# Patient Record
Sex: MALE | Race: WHITE | NOT HISPANIC OR LATINO | Employment: UNEMPLOYED | URBAN - METROPOLITAN AREA
[De-identification: names, ages, dates, MRNs, and addresses within clinical notes are randomized per-mention and may not be internally consistent; named-entity substitution may affect disease eponyms.]

---

## 2017-08-07 DIAGNOSIS — J30.2 OTHER SEASONAL ALLERGIC RHINITIS: ICD-10-CM

## 2017-08-07 NOTE — TELEPHONE ENCOUNTER
Routing refill request to provider for review/approval because:  Bronwyn given x1 and patient did not follow up, please advise    Thanks!     Lavern Collazo RN

## 2017-08-08 RX ORDER — FLUTICASONE PROPIONATE 50 MCG
SPRAY, SUSPENSION (ML) NASAL
Qty: 0.1 ML | Refills: 0
Start: 2017-08-08

## 2017-08-29 DIAGNOSIS — J30.2 OTHER SEASONAL ALLERGIC RHINITIS: ICD-10-CM

## 2017-08-29 RX ORDER — FLUTICASONE PROPIONATE 50 MCG
SPRAY, SUSPENSION (ML) NASAL
Qty: 16 ML | Refills: 0 | Status: SHIPPED | OUTPATIENT
Start: 2017-08-29 | End: 2022-08-05

## 2018-07-02 ENCOUNTER — OFFICE VISIT (OUTPATIENT)
Dept: DERMATOLOGY | Facility: CLINIC | Age: 48
End: 2018-07-02
Payer: COMMERCIAL

## 2018-07-02 VITALS — SYSTOLIC BLOOD PRESSURE: 146 MMHG | DIASTOLIC BLOOD PRESSURE: 89 MMHG | OXYGEN SATURATION: 97 % | HEART RATE: 75 BPM

## 2018-07-02 DIAGNOSIS — L81.4 LENTIGINES: ICD-10-CM

## 2018-07-02 DIAGNOSIS — L20.9 ATOPIC DERMATITIS, UNSPECIFIED TYPE: Primary | ICD-10-CM

## 2018-07-02 DIAGNOSIS — D48.5 NEOPLASM OF UNCERTAIN BEHAVIOR OF SKIN: ICD-10-CM

## 2018-07-02 DIAGNOSIS — D22.9 MULTIPLE NEVI: ICD-10-CM

## 2018-07-02 PROCEDURE — 88305 TISSUE EXAM BY PATHOLOGIST: CPT | Mod: TC | Performed by: PHYSICIAN ASSISTANT

## 2018-07-02 PROCEDURE — 11101 HC BIOPSY SKIN/SUBQ/MUC MEM, EACH ADDTL LESION: CPT | Performed by: PHYSICIAN ASSISTANT

## 2018-07-02 PROCEDURE — 11100 HC BIOPSY SKIN/SUBQ/MUC MEM, SINGLE LESION: CPT | Performed by: PHYSICIAN ASSISTANT

## 2018-07-02 PROCEDURE — 99203 OFFICE O/P NEW LOW 30 MIN: CPT | Mod: 25 | Performed by: PHYSICIAN ASSISTANT

## 2018-07-02 RX ORDER — TRIAMCINOLONE ACETONIDE 1 MG/G
CREAM TOPICAL
Qty: 80 G | Refills: 1 | Status: SHIPPED | OUTPATIENT
Start: 2018-07-02 | End: 2023-01-01

## 2018-07-02 NOTE — PROGRESS NOTES
HPI:  Esteban Casillas is a 47 year old year old male patient here today for fbe. Has a few moles on his back that he would like looked at. He has had some spots removed in the past, states all were benign  Duration: years  Symptoms:  none     Previous treatments: none     Alleviating/aggravating factors: none    Associated symptoms: none  Additional findings:grandfather had skin cancer on nose. Worked as a farmer.   Patient has no other skin complaints today.  Remainder of the HPI, Meds, PMH, Allergies, FH, and SH was reviewed in chart.      Past Medical History:   Diagnosis Date     Pain in joint, shoulder region        History reviewed. No pertinent surgical history.     Family History   Problem Relation Age of Onset     Breast Cancer Mother      Dementia Father      Skin Cancer Maternal Grandfather        Social History     Social History     Marital status:      Spouse name: N/A     Number of children: N/A     Years of education: N/A     Occupational History     Not on file.     Social History Main Topics     Smoking status: Never Smoker     Smokeless tobacco: Never Used     Alcohol use Yes     Drug use: No     Sexual activity: Yes     Partners: Female     Other Topics Concern     Not on file     Social History Narrative       Outpatient Encounter Prescriptions as of 7/2/2018   Medication Sig Dispense Refill     cetirizine HCl (ZYRTEC ALLERGY) 10 MG CAPS Take 1 tablet by mouth daily       fluticasone (FLONASE) 50 MCG/ACT nasal spray Spray 1-2 sprays into both nostrils daily 16 g 11     fluticasone (FLONASE) 50 MCG/ACT spray INSTILL 1-2 SPRAYS INTO BOTH NOSTRILS ONCE DAILY. 16 mL 0     triamcinolone (KENALOG) 0.1 % cream Apply a thin layer to affected area BID x 1-2 weeks during flares. Taper with improvement. 80 g 1     NO ACTIVE MEDICATIONS        No facility-administered encounter medications on file as of 7/2/2018.        Review Of Systems:  Skin: As above  Eyes: negative  Ears/Nose/Throat:  negative  Respiratory: No shortness of breath, dyspnea on exertion, cough, or hemoptysis  Cardiovascular: negative  Gastrointestinal: negative  Genitourinary: negative  Musculoskeletal: negative  Neurologic: negative  Psychiatric: negative  Hematologic/Lymphatic/Immunologic: negative  Endocrine: negative      Objective:     /89  Pulse 75  SpO2 97%  Eyes: Conjunctivae/lids: Normal   ENT: Lips:  Normal  MSK: Normal  Cardiovascular: Peripheral edema none  Pulm: Breathing Normal  Neuro/Psych: Orientation: Normal; Mood/Affect: Normal, NAD, WDWN  Following areas examined:   Scalp, face, eyelids, lips, neck, chest, abdomen, back, buttocks, and R&L upper and lower extremities.  Patrick skin type:I   Findings:    1)Tan WD smooth macules on face, neck, trunk, and extremities.  2)Well circumscribed macules with symmetric color distribution on trunk and extremities 2-4mm  3) WD light brown macule on right lateral lumbar back 0.7 cm   4) WD light/medium brown patch on right medial lumbar back 1.1cm  5) normal appearing skin      Assessment and Plan:  1-2) lentigines and benign nevi  I discussed the specifics of tumor, prognosis, and genetics of benign lesions.  I explained that treatment of these lesions would be purely cosmetic and not medically neccessary.  I discussed with patient different removal options including excision, cryotherapy, cautery and /or laser.      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  No complications and routine wound care.  May grow back and will get a scar. Based on lesion type may need to completely remove lesion. Patient will be notified in 7-10 days of results. Wound care directions given.  4) Neoplasm of uncertain behavior on right medial lumbar back 1.1cm  Rule out atypical nevus  TANGENTIAL BIOPSY:  After consent, anesthesia with LEC  and prep, tangential biopsy performed.  No complications and routine wound care.  May grow back and will get a scar. Based on lesion type may need to completely remove lesion. Patient will be notified in 7-10 days of results. Wound care directions given.  Signs and Symptoms of non-melanoma skin cancer and ABCDEs of melanoma reviewed with patient. Patient encouraged to perform monthly self skin exams and educated on how to perform them. UV precautions reviewed with patient. Patient was asked about new or changing moles/lesions on body.   5) Atopic dermatitis  Apply TMC BID x 1-2 weeks tapering with improvement  Discussed side effects of topical steroids including but not limited to atrophy (skin thinning), striae (stretch marks) telangiectasias, steroid acne, and others. Do not apply to normal skin. Do not apply to discolored skin that does not have rash present. Educated patient on post inflammatory hyperpigmentation.       Follow up in yearly FBE

## 2018-07-02 NOTE — PATIENT INSTRUCTIONS
Proper skin care from [facility]:    -Eliminate harsh soaps as they strip the natural oils from the skin, often resulting in dry itchy skin ( i.e. Dial, Zest, Laurie Spring)  -Use mild soaps such as Cetaphil or Dove Sensitive Skin in the shower. You do not need to use soap on arms, legs, and trunk every time you shower unless visibly soiled.   -Avoid hot or cold showers.  -After showering, lightly dry off and apply moisturizing within 2-3 minutes. This will help trap moisture in the skin.   -Aggressive use of a moisturizer at least 1-2 times a day to the entire body (including -Vanicream, Cetaphil, Aquaphor or Cerave) and moisturize hands after every washing.  -We recommend using moisturizers that come in a tub that needs to be scooped out, not a pump. This has more of an oil base. It will hold moisture in your skin much better than a water base moisturizer. The above recommended are non-pore clogging.           Wear a sunscreen with at least SPF 30 on your face, ears, neck and V of the chest daily. Wear sunscreen on other areas of the body if those areas are exposed to the sun throughout the day. Sunscreens can contain physical and/or chemical blockers. Physical blockers are less likely to clog pores, these include zinc oxide and titanium dioxide. Reapply every two hour and after swimming. Sunscreen examples include Neutrogena, CeraVe, Blue Lizard, Elta MD and many others.    UV radiation  UVA radiation remains constant throughout the day and throughout the year. It is a longer wavelength than UVB and therefore penetrates deeper into the skin leading to immediate and delayed tanning, photoaging, and skin cancer. 70-80% of UVA and UVB radiation occurs between the hours of 10am-2pm.  UVB radiation  UVB radiation causes the most harmful effects and is more significant during the summer months. However, snow and ice can reflect UVB radiation leading to skin damage during the winter months as well. UVB  radiation is responsible for tanning, burning, inflammation, delayed erythema (pinkness), pigmentation (brown spots), and skin cancer.   Just because you do not burn or are not developing a tan does not mean that you are not damaging your skin. A 15 minute drive to and from work for 30 years an lead to chronic sun damage of the skin. It is important to wear a broad spectrum (both UVA and UVB) sunscreen EVERY day with at least 30 SPF. Apply to face, ears, neck and v of the chest as this is where most of our sun exposure is. Reapply sunscreen every two hours if you plan on being outside.   Thompson Jung. Clinical Dermatology: A Color Guide to Diagnosis and Therapy. Elsevier, 2016.                  Wound Care Instructions     FOR SUPERFICIAL WOUNDS     [facility] [phone]    [facility] [phone]          AFTER 24 HOURS YOU SHOULD REMOVE THE BANDAGE AND BEGIN DAILY DRESSING CHANGES AS FOLLOWS:     1) Remove Dressing.     2) Clean and dry the area with tap water using a Q-tip or sterile gauze pad.     3) Apply Vaseline, Aquaphor, Polysporin ointment or Bacitracin ointment over entire wound.  Do NOT use Neosporin ointment.     4) Cover the wound with a band-aid, or a sterile non-stick gauze pad and micropore paper tape      REPEAT THESE INSTRUCTIONS AT LEAST ONCE A DAY UNTIL THE WOUND HAS COMPLETELY HEALED.    It is an old wives tale that a wound heals better when it is exposed to air and allowed to dry out. The wound will heal faster with a better cosmetic result if it is kept moist with ointment and covered with a bandage.    **Do not let the wound dry out.**      Supplies Needed:      *Cotton tipped applicators (Q-tips)    *Polysporin Ointment or Bacitracin Ointment (NOT NEOSPORIN)    *Band-aids or non-stick gauze pads and micropore paper tape.      PATIENT INFORMATION:    During the healing process you will notice a number of changes. All wounds develop a small halo of redness  surrounding the wound.  This means healing is occurring. Severe itching with extensive redness usually indicates sensitivity to the ointment or bandage tape used to dress the wound.  You should call our office if this develops.      Swelling  and/or discoloration around your surgical site is common, particularly when performed around the eye.    All wounds normally drain.  The larger the wound the more drainage there will be.  After 7-10 days, you will notice the wound beginning to shrink and new skin will begin to grow.  The wound is healed when you can see skin has formed over the entire area.  A healed wound has a healthy, shiny look to the surface and is red to dark pink in color to normalize.  Wounds may take approximately 4-6 weeks to heal.  Larger wounds may take 6-8 weeks.  After the wound is healed you may discontinue dressing changes.    You may experience a sensation of tightness as your wound heals. This is normal and will gradually subside.    Your healed wound may be sensitive to temperature changes. This sensitivity improves with time, but if you re having a lot of discomfort, try to avoid temperature extremes.    Patients frequently experience itching after their wound appears to have healed because of the continue healing under the skin.  Plain Vaseline will help relieve the itching.        POSSIBLE COMPLICATIONS    BLEEDIN. Leave the bandage in place.  2. Use tightly rolled up gauze or a cloth to apply direct pressure over the bandage for 30  minutes.  3. Reapply pressure for an additional 30 minutes if necessary  4. Use additional gauze and tape to maintain pressure once the bleeding has stopped.

## 2018-07-02 NOTE — MR AVS SNAPSHOT
After Visit Summary   7/2/2018    Esteban Casillas    MRN: [id number]           Patient Information     Date Of Birth          1970        Visit Information        Provider Department      7/2/2018 3:20 PM Evelyn Osborne PA-C [facility]        Today's Diagnoses     Atopic dermatitis, unspecified type    -  1    Neoplasm of uncertain behavior of skin          Care Instructions    Proper skin care from [facility]:    -Eliminate harsh soaps as they strip the natural oils from the skin, often resulting in dry itchy skin ( i.e. Dial, Zest, [place] Spring)  -Use mild soaps such as Cetaphil or Dove Sensitive Skin in the shower. You do not need to use soap on arms, legs, and trunk every time you shower unless visibly soiled.   -Avoid hot or cold showers.  -After showering, lightly dry off and apply moisturizing within 2-3 minutes. This will help trap moisture in the skin.   -Aggressive use of a moisturizer at least 1-2 times a day to the entire body (including -Vanicream, Cetaphil, Aquaphor or Cerave) and moisturize hands after every washing.  -We recommend using moisturizers that come in a tub that needs to be scooped out, not a pump. This has more of an oil base. It will hold moisture in your skin much better than a water base moisturizer. The above recommended are non-pore clogging.           Wear a sunscreen with at least SPF 30 on your face, ears, neck and V of the chest daily. Wear sunscreen on other areas of the body if those areas are exposed to the sun throughout the day. Sunscreens can contain physical and/or chemical blockers. Physical blockers are less likely to clog pores, these include zinc oxide and titanium dioxide. Reapply every two hour and after swimming. Sunscreen examples include Neutrogena, CeraVe, Blue Lizard, Elta MD and many others.    UV radiation  UVA radiation remains constant throughout the day and throughout the year. It is a longer  wavelength than UVB and therefore penetrates deeper into the skin leading to immediate and delayed tanning, photoaging, and skin cancer. 70-80% of UVA and UVB radiation occurs between the hours of 10am-2pm.  UVB radiation  UVB radiation causes the most harmful effects and is more significant during the summer months. However, snow and ice can reflect UVB radiation leading to skin damage during the winter months as well. UVB radiation is responsible for tanning, burning, inflammation, delayed erythema (pinkness), pigmentation (brown spots), and skin cancer.   Just because you do not burn or are not developing a tan does not mean that you are not damaging your skin. A 15 minute drive to and from work for 30 years an lead to chronic sun damage of the skin. It is important to wear a broad spectrum (both UVA and UVB) sunscreen EVERY day with at least 30 SPF. Apply to face, ears, neck and v of the chest as this is where most of our sun exposure is. Reapply sunscreen every two hours if you plan on being outside.   Thompson Jung. Clinical Dermatology: A Color Guide to Diagnosis and Therapy. Elsevier, 2016.                  Wound Care Instructions     FOR SUPERFICIAL WOUNDS     [facility] [phone]    [facility] [phone]          AFTER 24 HOURS YOU SHOULD REMOVE THE BANDAGE AND BEGIN DAILY DRESSING CHANGES AS FOLLOWS:     1) Remove Dressing.     2) Clean and dry the area with tap water using a Q-tip or sterile gauze pad.     3) Apply Vaseline, Aquaphor, Polysporin ointment or Bacitracin ointment over entire wound.  Do NOT use Neosporin ointment.     4) Cover the wound with a band-aid, or a sterile non-stick gauze pad and micropore paper tape      REPEAT THESE INSTRUCTIONS AT LEAST ONCE A DAY UNTIL THE WOUND HAS COMPLETELY HEALED.    It is an old wives tale that a wound heals better when it is exposed to air and allowed to dry out. The wound will heal faster with a better cosmetic result  if it is kept moist with ointment and covered with a bandage.    **Do not let the wound dry out.**      Supplies Needed:      *Cotton tipped applicators (Q-tips)    *Polysporin Ointment or Bacitracin Ointment (NOT NEOSPORIN)    *Band-aids or non-stick gauze pads and micropore paper tape.      PATIENT INFORMATION:    During the healing process you will notice a number of changes. All wounds develop a small halo of redness surrounding the wound.  This means healing is occurring. Severe itching with extensive redness usually indicates sensitivity to the ointment or bandage tape used to dress the wound.  You should call our office if this develops.      Swelling  and/or discoloration around your surgical site is common, particularly when performed around the eye.    All wounds normally drain.  The larger the wound the more drainage there will be.  After 7-10 days, you will notice the wound beginning to shrink and new skin will begin to grow.  The wound is healed when you can see skin has formed over the entire area.  A healed wound has a healthy, shiny look to the surface and is red to dark pink in color to normalize.  Wounds may take approximately 4-6 weeks to heal.  Larger wounds may take 6-8 weeks.  After the wound is healed you may discontinue dressing changes.    You may experience a sensation of tightness as your wound heals. This is normal and will gradually subside.    Your healed wound may be sensitive to temperature changes. This sensitivity improves with time, but if you re having a lot of discomfort, try to avoid temperature extremes.    Patients frequently experience itching after their wound appears to have healed because of the continue healing under the skin.  Plain Vaseline will help relieve the itching.        POSSIBLE COMPLICATIONS    BLEEDIN. Leave the bandage in place.  2. Use tightly rolled up gauze or a cloth to apply direct pressure over the bandage for 30  minutes.  3. Reapply pressure  for an additional 30 minutes if necessary  4. Use additional gauze and tape to maintain pressure once the bleeding has stopped.              Follow-ups after your visit        Who to contact     If you have questions or need follow up information about today's clinic visit or your schedule please contact [facility] directly at [phone].  Normal or non-critical lab and imaging results will be communicated to you by MyChart, letter or phone within 4 business days after the clinic has received the results. If you do not hear from us within 7 days, please contact the clinic through MyChart or phone. If you have a critical or abnormal lab result, we will notify you by phone as soon as possible.  Submit refill requests through [employer] or call your pharmacy and they will forward the refill request to us. Please allow 3 business days for your refill to be completed.          Additional Information About Your Visit        Care EveryWhere ID     This is your Care EveryWhere ID. This could be used by other organizations to access your [facility] [facility] medical records  [id number]        Your Vitals Were     Pulse Pulse Oximetry                75 97%           Blood Pressure from Last 3 Encounters:   07/02/18 146/89   06/13/16 130/80    Weight from Last 3 Encounters:   06/13/16 102.5 kg (226 lb)   05/14/13 93 kg (205 lb)              We Performed the Following     BIOPSY SKIN/SUBQ/MUC MEM, EACH ADDTL LESION     BIOPSY SKIN/SUBQ/MUC MEM, SINGLE LESION     Dermatological path order and indications          Today's Medication Changes          These changes are accurate as of 7/2/18  3:47 PM.  If you have any questions, ask your nurse or doctor.               Start taking these medicines.        Dose/Directions    triamcinolone 0.1 % cream   Commonly known as:  KENALOG   Used for:  Atopic dermatitis, unspecified type   Started by:  Evelyn Osborne PA-C        Apply a thin layer to affected area BID x  1-2 weeks during flares. Taper with improvement.   Quantity:  80 g   Refills:  1            Where to get your medicines      These medications were sent to [place] [id number] [place] [facility] [address]  [address], [place] [address]     Phone:  [phone]     triamcinolone 0.1 % cream                Primary Care Provider    None Specified       No primary provider on file.        Equal Access to Services     [facility] Center[facility] : Hadii aad ku hadasho Soomaali, waaxda luqadaha, qaybta kaalmada adeegyada, waxay idiin hayaan adeeg kharash lamarcos . So [facility] [phone].    ATENCIÓN: Si habla espryne, tiene a townsend disposición servicios gratuitos de asistencia lingüística. Llame al [phone].    We comply with applicable federal civil rights laws and [place] laws. We do not discriminate on the basis of race, color, national origin, age, disability, sex, sexual orientation, or gender identity.            Thank you!     Thank you for choosing [facility]  for your care. Our goal is always to provide you with excellent care. Hearing back from our patients is one way we can continue to improve our services. Please take a few minutes to complete the written survey that you may receive in the mail after your visit with us. Thank you!             Your Updated Medication List - Protect others around you: Learn how to safely use, store and throw away your medicines at www.disposemymeds.org.          This list is accurate as of 7/2/18  3:47 PM.  Always use your most recent med list.                   Brand Name Dispense Instructions for use Diagnosis    * fluticasone 50 MCG/ACT spray    FLONASE    16 g    Spray 1-2 sprays into both nostrils daily    Other seasonal allergic rhinitis       * fluticasone 50 MCG/ACT spray    FLONASE    16 mL    INSTILL 1-2 SPRAYS INTO BOTH NOSTRILS ONCE DAILY.    Other seasonal allergic rhinitis       NO ACTIVE MEDICATIONS           triamcinolone 0.1 % cream     KENALOG    80 g    Apply a thin layer to affected area BID x 1-2 weeks during flares. Taper with improvement.    Atopic dermatitis, unspecified type       ZYRTEC ALLERGY 10 MG Caps   Generic drug:  cetirizine HCl      Take 1 tablet by mouth daily    Other seasonal allergic rhinitis       * Notice:  This list has 2 medication(s) that are the same as other medications prescribed for you. Read the directions carefully, and ask your doctor or other care provider to review them with you.

## 2018-07-06 LAB — COPATH REPORT: NORMAL

## 2018-07-09 ENCOUNTER — TELEPHONE (OUTPATIENT)
Dept: DERMATOLOGY | Facility: CLINIC | Age: 48
End: 2018-07-09

## 2018-07-09 NOTE — TELEPHONE ENCOUNTER
Called and LM for patient to call back in regards to biopsy results antonietta Forrest RN-BSN  [facility]  [phone]

## 2018-07-09 NOTE — TELEPHONE ENCOUNTER
Notes Recorded by Evelyn Osborne PA-C on 7/9/2018 at 1:36 PM  A. 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 Skin, right lateral lumbar back:   - Predominantly intradermal melanocytic nevus     This is a benign lesion that does not need any additional treatment. Please let me know if you have any questions.

## 2018-07-09 NOTE — LETTER
[address] [address]  Phone: [phone]  Fax: [phone]    July 12, 2018      Esteban Santarecht                                                                                                                   [address] [place] [id number]            Dear Susie Vinny,    Both of your biopsies came back as benign lesions. There is no additional treatment needed.     Please let me know if you have any questions.    Thank you,      [facility] / Kelley Jones RN-BSN

## 2018-07-10 NOTE — TELEPHONE ENCOUNTER
Called and LM for patient to call back in regards to biopsy results antonietta Forrest RN-BSN  [facility]  [phone]

## 2018-07-11 NOTE — TELEPHONE ENCOUNTER
Called and LM for patient to call back in regards to biopsy results antonietta Forrest RN-BSN  [facility]  [phone]

## 2018-07-12 NOTE — TELEPHONE ENCOUNTER
After 3 phone call attempts with no answer- snail mail letter sent:     Dear Mr. Casillas,    Both of your biopsies came back as benign lesions. There is no additional treatment needed.     Please let me know if you have any questions.    Thank you,      [facility] / Kelley Jones RN-BSN

## 2018-07-12 NOTE — TELEPHONE ENCOUNTER
Called and LM for patient to call back in regards to biopsy results antonietta Forrest RN-BSN  [facility]  [phone]

## 2019-12-02 ENCOUNTER — ANCILLARY PROCEDURE (OUTPATIENT)
Dept: GENERAL RADIOLOGY | Facility: CLINIC | Age: 49
End: 2019-12-02
Attending: PODIATRIST
Payer: OTHER GOVERNMENT

## 2019-12-02 ENCOUNTER — OFFICE VISIT (OUTPATIENT)
Dept: PODIATRY | Facility: CLINIC | Age: 49
End: 2019-12-02
Payer: OTHER GOVERNMENT

## 2019-12-02 VITALS
BODY MASS INDEX: 28.69 KG/M2 | WEIGHT: 235.6 LBS | HEIGHT: 76 IN | DIASTOLIC BLOOD PRESSURE: 80 MMHG | HEART RATE: 84 BPM | SYSTOLIC BLOOD PRESSURE: 128 MMHG

## 2019-12-02 DIAGNOSIS — S93.692A RUPTURE OF PLANTAR FASCIA OF LEFT FOOT, INITIAL ENCOUNTER: ICD-10-CM

## 2019-12-02 DIAGNOSIS — M79.672 PAIN OF LEFT HEEL: Primary | ICD-10-CM

## 2019-12-02 PROCEDURE — 73650 X-RAY EXAM OF HEEL: CPT | Mod: LT

## 2019-12-02 PROCEDURE — 99204 OFFICE O/P NEW MOD 45 MIN: CPT | Performed by: PODIATRIST

## 2019-12-02 ASSESSMENT — MIFFLIN-ST. JEOR: SCORE: 2045.17

## 2019-12-02 NOTE — PATIENT INSTRUCTIONS
Thank you for choosing [facility]!    Follow up in 2 weeks    DR. ALEJANDRE'S CLINIC LOCATIONS     MONDAY  [place] TUESDAY & FRIDAY AM  AD[place]   [address]   [address]   [place] [address] [place] [address]   [phone]  [phone]  [phone]       WEDNESDAY  [place] SCHEDULE SURGERY: [phone]   [address] [facility] APPOINTMENTS: [phone]   [place] [address] BILLING QUESTIONS: [phone]   [phone]         BLOOD CLOT PREVENTION - WEARING A CAST BOOT    Do not drive with the CAM boot  Do not wear during long-distance travel: long car rides / plane trips  Wear the boot when moving, regardless of your weight-bearing status    Any brace that extends up to the knee can increase your chance of developing a blood clot. Blood clots generally occur in the large veins of your calf, thigh, or abdomen. A blood clot may form when blood is stagnant in the veins while your leg is immobilized in the brace. Ginger and relaxing the calf muscles by moving the ankle is the best method to avoid stagnant blood. Clarify with your doctor if you should be doing this as this may not be recommended for certain tendon surgeries.    Blood clots or deep venous thrombosis (DVT) can be life threatening if a portion of the clot breaks away and travels through the veins to your lungs. This is called a pulmonary embolism (PE) which can result in death.    Symptoms of a DVT may include swelling, tenderness, a warm feeling or redness in the leg. DVT can occur in both legs, even if only one side is in a brace. If you have these symptoms, you should call your doctor immediately or go to the Emergency Room to have your leg scanned.     Symptoms of a pulmonary embolism (PE) include chest pain, shortness of breath or the need to breath rapidly that is not associated with exercise, difficulty breathing, rapid heart rate,  coughing or coughing up blood, or a feeling of passing out. Chest pain can range from mild to knife-like pain while taking a deep breath. Pulmonary embolism can occur without any symptoms whatsoever. You need to be evaluated in a hospital emergency room immediately if you have symptoms of a PE. Please call 911 as PE is a life-threatening emergency.    Be certain that you understand how much ankle range of motion is allowed. Your foot and ankle problem is being immobilized by the brace, yet we do not want you to develop a blood clot. The velcro strap braces are intended to be removed for periodic exercise of the ankle. Your doctor will tell you if it is okay to do this depending on the type of surgery or injury you had.    Your own personal risk of developing a DVT or PE is dependent on many factors. A personal or family history of previous blood clot or a clotting disorder (such as thrombophilia) puts you at risk. Other risk factors include history of cancer, current use of estrogen medications (such as birth control pills or post menopausal medications), recent trauma or fracture, diabetes, recent heart attack, COPD, heart failure, pregnancy, obesity, advanced age, smoking, etc. Please make sure your doctor is aware if you have any of these risk factors.    PRICE THERAPY  Many aches and pains throughout the foot and ankle can be helped with many simple treatments. This is usually described as PRICE Therapy.      P - Protection - often times, inflammation/pain in the lower extremity is not able to improve simply because the areas involved are never allowed to rest. Every step we take can bother the problematic area. Protecting those areas is an important step in the healing process. This may involve a walking cast boot, a special insert/orthotic device, an ankle brace, or simply avoiding barefoot walking.    R - Rest - in addition to protecting the foot/ankle, resting is an important, but often times difficult,  treatment option. Getting off your feet when they bother you, and specifically avoiding activities that cause pain/discomfort, are very beneficial to prevent, and treat, foot/ankle pain.      I - Ice - icing regularly can help to decrease inflammation and swelling in the foot, thus decreasing pain. Using an ice pack or a bag of frozen veggies works very well. Ice for 20 minutes multiple times per day as needed.  Do not place the ice directly on the skin as this can cause tissue damage.    C - Compression - using a compression wrap or an ACE wrap can help to decrease swelling, which can help to decrease pain. Wearing the wraps is generally not needed at night, but they should be worn on a regular basis when you are going to be on your feet for prolonged periods as gravity tends to pull fluids down to your feet/ankles.    E - Elevation - elevating your lower extremities multiple times daily for 15-20 minutes can help to decrease swelling, which works well in decreasing pain levels.    NSAID/Tylenol - Anti-inflammatories like Aleve or ibuprofen, and/or a pain medication, such as Tylenol, can help to improve pain levels and get the issue resolved sooner rather than later. Anyone with liver issues should be careful with Tylenol, and anyone with high blood pressure or heart, stomach or kidney issues should be careful with anti-inflammatories. Please ask if you have questions about these medications, including dosage.      BODY WEIGHT AND YOUR FEET  The following information is included in the after visit summary for all patients. Body weight can be a sensitive issue to discuss in clinic, but we think the following information is very important. Although we focus on the feet and ankles, we do support the overall health of our patients.     Many things can cause foot and ankle problems. Foot structure, activity level, foot mechanics and injuries are common causes of pain. One very important issue that often goes  unmentioned, is body weight. Extra weight can cause increased stress on muscles, ligaments, bones and tendons. Sometimes just a few extra pounds is all it takes to put one over her/his threshold. Without reducing that stress, it can be difficult to alleviate pain. As Foot & Ankle specialists, our job is addressing the lower extremity problem and possible causes. Regarding extra body weight, we encourage patients to discuss diet and weight management plans with their primary care doctors. It is this team approach that gives you the best opportunity for pain relief and getting you back on your feet.      [facility] has a Comprehensive Weight Management Program. This program includes counseling, education, non-surgical and surgical approaches to weight loss. If you are interested in learning more either talk to you primary care provider or call [phone].

## 2019-12-02 NOTE — LETTER
"    12/2/2019         RE: Esteban Casillas  [address] [place] [address]        Dear Colleague,    Thank you for referring your patient, Esteban Casillas, to the [facility]. Please see a copy of my visit note below.    PATIENT HISTORY:  Esteban Casillas is a 48 year old male who presents to clinic for L severe heel pain.  1 day duration.  Felt a \"pop\" under his heel working out yesterday.  Reports pain with wt bearing.  Reports some mild plantar heel pain prior.  No treatments.      Review of Systems:  Patient denies fever, chills, rash, wound, stiffness, limping, numbness, weakness, heart burn, blood in stool, chest pain with activity, calf pain when walking, shortness of breath with activity, chronic cough, easy bleeding/bruising, swelling of ankles, excessive thirst, fatigue, depression, anxiety.       PAST MEDICAL HISTORY:   Past Medical History:   Diagnosis Date     Pain in joint, shoulder region         PAST SURGICAL HISTORY:   No pertinent past surgical history.     MEDICATIONS:   Current Outpatient Medications:      order for DME, L CAM walker - short, Disp: 1 Device, Rfl: 0     cetirizine HCl (ZYRTEC ALLERGY) 10 MG CAPS, Take 1 tablet by mouth daily, Disp: , Rfl:      fluticasone (FLONASE) 50 MCG/ACT nasal spray, Spray 1-2 sprays into both nostrils daily, Disp: 16 g, Rfl: 11     fluticasone (FLONASE) 50 MCG/ACT spray, INSTILL 1-2 SPRAYS INTO BOTH NOSTRILS ONCE DAILY., Disp: 16 mL, Rfl: 0     NO ACTIVE MEDICATIONS, , Disp: , Rfl:      triamcinolone (KENALOG) 0.1 % cream, Apply a thin layer to affected area BID x 1-2 weeks during flares. Taper with improvement., Disp: 80 g, Rfl: 1     ALLERGIES:  No Known Allergies     SOCIAL HISTORY:   Social History     Socioeconomic History     Marital status:      Spouse name: Not on file     Number of children: Not on file     Years of education: Not on file     Highest education level: Not on file   Occupational History     Not on " "file   Social Needs     Financial resource strain: Not on file     Food insecurity:     Worry: Not on file     Inability: Not on file     Transportation needs:     Medical: Not on file     Non-medical: Not on file   Tobacco Use     Smoking status: Never Smoker     Smokeless tobacco: Never Used   Substance and Sexual Activity     Alcohol use: Yes     Drug use: No     Sexual activity: Yes     Partners: Female   Lifestyle     Physical activity:     Days per week: Not on file     Minutes per session: Not on file     Stress: Not on file   Relationships     Social connections:     Talks on phone: Not on file     Gets together: Not on file     Attends Voodoo service: Not on file     Active member of club or organization: Not on file     Attends meetings of clubs or organizations: Not on file     Relationship status: Not on file     Intimate partner violence:     Fear of current or ex partner: Not on file     Emotionally abused: Not on file     Physically abused: Not on file     Forced sexual activity: Not on file   Other Topics Concern     Parent/sibling w/ CABG, MI or angioplasty before 65F 55M? Not Asked   Social History Narrative     Not on file        FAMILY HISTORY:   Family History   Problem Relation Age of Onset     Breast Cancer Mother      Dementia Father      Skin Cancer Maternal Grandfather         EXAM:Vitals: /80   Pulse 84   Ht 1.93 m (6' 4\")   Wt 106.9 kg (235 lb 9.6 oz)   BMI 28.68 kg/m     BMI= Body mass index is 28.68 kg/m .    General appearance: Patient is alert and fully cooperative with history & exam.  No sign of distress is noted during the visit.     Psychiatric: Affect is pleasant & appropriate.  Patient appears motivated to improve health.     Respiratory: Breathing is regular & unlabored while sitting.     HEENT: Hearing is intact to spoken word.  Speech is clear.  No gross evidence of visual impairment that would impact ambulation.     Dermatologic: Skin is intact to L foot without " significant lesions, rash or abrasion.  No paronychia or evidence of soft tissue infection is noted.     Vascular: DP & PT pulses are intact & regular on the L.  No significant edema or varicosities noted.  CFT and skin temperature are normal.     Neurologic: Lower extremity sensation is intact to light touch.  No evidence of weakness or contracture in the lower extremities.  No evidence of neuropathy.     Musculoskeletal:  L plantar heel pain at fascial insertion.  Patient is ambulatory without assistive device or brace.  No gross ankle deformity noted.  No foot or ankle joint effusion is noted.    XRs of L heel reviewed with pt.  No acute findings.     ASSESSMENT:   L heel pain, suspect tear/rupture of plantar fascia     PLAN:  Reviewed patient's chart in epic.  Discussed condition and treatment options including pros and cons.    History/exam suggest plantar fascia tear/rupture, likely preceded by plantar fasciitis.  Plantar fasciitis alone possible, but pt reports a pop in the heel with acute pain.  RICE advised.  Will place in short Aircast boot.  WBAT.  Follow-up in 2 wks.  Pt deferred MR for now.    We discussed immobilization and the risk of blood clot. ROM exercises and compression recommended. Patient to seek medical attention if calf swelling and/or pain, chest pain, shortness of breath.    Raúl Macias DPM, FACFAS    Weight management plan: Patient was referred to their PCP to discuss a diet and exercise plan.      Again, thank you for allowing me to participate in the care of your patient.        Sincerely,        Raúl Macias DPM

## 2019-12-02 NOTE — PROGRESS NOTES
"PATIENT HISTORY:  Esteban Casillas is a 48 year old male who presents to clinic for L severe heel pain.  1 day duration.  Felt a \"pop\" under his heel working out yesterday.  Reports pain with wt bearing.  Reports some mild plantar heel pain prior.  No treatments.      Review of Systems:  Patient denies fever, chills, rash, wound, stiffness, limping, numbness, weakness, heart burn, blood in stool, chest pain with activity, calf pain when walking, shortness of breath with activity, chronic cough, easy bleeding/bruising, swelling of ankles, excessive thirst, fatigue, depression, anxiety.       PAST MEDICAL HISTORY:   Past Medical History:   Diagnosis Date     Pain in joint, shoulder region         PAST SURGICAL HISTORY:   No pertinent past surgical history.     MEDICATIONS:   Current Outpatient Medications:      order for DME, L CAM walker - short, Disp: 1 Device, Rfl: 0     cetirizine HCl (ZYRTEC ALLERGY) 10 MG CAPS, Take 1 tablet by mouth daily, Disp: , Rfl:      fluticasone (FLONASE) 50 MCG/ACT nasal spray, Spray 1-2 sprays into both nostrils daily, Disp: 16 g, Rfl: 11     fluticasone (FLONASE) 50 MCG/ACT spray, INSTILL 1-2 SPRAYS INTO BOTH NOSTRILS ONCE DAILY., Disp: 16 mL, Rfl: 0     NO ACTIVE MEDICATIONS, , Disp: , Rfl:      triamcinolone (KENALOG) 0.1 % cream, Apply a thin layer to affected area BID x 1-2 weeks during flares. Taper with improvement., Disp: 80 g, Rfl: 1     ALLERGIES:  No Known Allergies     SOCIAL HISTORY:   Social History     Socioeconomic History     Marital status:      Spouse name: Not on file     Number of children: Not on file     Years of education: Not on file     Highest education level: Not on file   Occupational History     Not on file   Social Needs     Financial resource strain: Not on file     Food insecurity:     Worry: Not on file     Inability: Not on file     Transportation needs:     Medical: Not on file     Non-medical: Not on file   Tobacco Use     Smoking status: Never " "Smoker     Smokeless tobacco: Never Used   Substance and Sexual Activity     Alcohol use: Yes     Drug use: No     Sexual activity: Yes     Partners: Female   Lifestyle     Physical activity:     Days per week: Not on file     Minutes per session: Not on file     Stress: Not on file   Relationships     Social connections:     Talks on phone: Not on file     Gets together: Not on file     Attends Gnosticism service: Not on file     Active member of club or organization: Not on file     Attends meetings of clubs or organizations: Not on file     Relationship status: Not on file     Intimate partner violence:     Fear of current or ex partner: Not on file     Emotionally abused: Not on file     Physically abused: Not on file     Forced sexual activity: Not on file   Other Topics Concern     Parent/sibling w/ CABG, MI or angioplasty before 65F 55M? Not Asked   Social History Narrative     Not on file        FAMILY HISTORY:   Family History   Problem Relation Age of Onset     Breast Cancer Mother      Dementia Father      Skin Cancer Maternal Grandfather         EXAM:Vitals: /80   Pulse 84   Ht 1.93 m (6' 4\")   Wt 106.9 kg (235 lb 9.6 oz)   BMI 28.68 kg/m    BMI= Body mass index is 28.68 kg/m .    General appearance: Patient is alert and fully cooperative with history & exam.  No sign of distress is noted during the visit.     Psychiatric: Affect is pleasant & appropriate.  Patient appears motivated to improve health.     Respiratory: Breathing is regular & unlabored while sitting.     HEENT: Hearing is intact to spoken word.  Speech is clear.  No gross evidence of visual impairment that would impact ambulation.     Dermatologic: Skin is intact to L foot without significant lesions, rash or abrasion.  No paronychia or evidence of soft tissue infection is noted.     Vascular: DP & PT pulses are intact & regular on the L.  No significant edema or varicosities noted.  CFT and skin temperature are normal.   "   Neurologic: Lower extremity sensation is intact to light touch.  No evidence of weakness or contracture in the lower extremities.  No evidence of neuropathy.     Musculoskeletal:  L plantar heel pain at fascial insertion.  Patient is ambulatory without assistive device or brace.  No gross ankle deformity noted.  No foot or ankle joint effusion is noted.    XRs of L heel reviewed with pt.  No acute findings.     ASSESSMENT:   L heel pain, suspect tear/rupture of plantar fascia     PLAN:  Reviewed patient's chart in epic.  Discussed condition and treatment options including pros and cons.    History/exam suggest plantar fascia tear/rupture, likely preceded by plantar fasciitis.  Plantar fasciitis alone possible, but pt reports a pop in the heel with acute pain.  RICE advised.  Will place in short Aircast boot.  WBAT.  Follow-up in 2 wks.  Pt deferred MR for now.    We discussed immobilization and the risk of blood clot. ROM exercises and compression recommended. Patient to seek medical attention if calf swelling and/or pain, chest pain, shortness of breath.    Raúl Macias DPM, FACFAS    Weight management plan: Patient was referred to their PCP to discuss a diet and exercise plan.

## 2019-12-16 ENCOUNTER — OFFICE VISIT (OUTPATIENT)
Dept: PODIATRY | Facility: CLINIC | Age: 49
End: 2019-12-16
Payer: OTHER GOVERNMENT

## 2019-12-16 VITALS
HEIGHT: 76 IN | DIASTOLIC BLOOD PRESSURE: 82 MMHG | BODY MASS INDEX: 28.62 KG/M2 | WEIGHT: 235 LBS | HEART RATE: 74 BPM | SYSTOLIC BLOOD PRESSURE: 132 MMHG

## 2019-12-16 DIAGNOSIS — M79.672 PAIN OF LEFT HEEL: Primary | ICD-10-CM

## 2019-12-16 PROCEDURE — 99213 OFFICE O/P EST LOW 20 MIN: CPT | Performed by: PODIATRIST

## 2019-12-16 ASSESSMENT — MIFFLIN-ST. JEOR: SCORE: 2042.45

## 2019-12-16 NOTE — PROGRESS NOTES
"PATIENT HISTORY:  Esteban Casillas is a 48 year old male who presents to clinic for recheck of L heel pain, suspected plantar fascia injury/tear.  Doing much better.  He is back in shoes.  The Aircast boot helped.  Injury was over 2 wks ago.  He has some pain in the AM, plantar heel.  No fevers, chills, limping.  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     EXAM:Vitals: /82   Pulse 74   Ht 1.93 m (6' 4\")   Wt 106.6 kg (235 lb)   BMI 28.61 kg/m    BMI= Body mass index is 28.61 kg/m .    General appearance: Patient is alert and fully cooperative with history & exam.  No sign of distress is noted during the visit.     Dermatologic: Skin is intact to L foot without significant lesions, rash or abrasion.  No paronychia or evidence of soft tissue infection is noted.     Vascular: DP & PT pulses are intact & regular on the L.  No significant edema or varicosities noted.  CFT and skin temperature are normal.     Neurologic: Lower extremity sensation is intact to light touch.  No evidence of weakness or contracture in the lower extremities.  No evidence of neuropathy.     Musculoskeletal: L plantar heel pain at fascial insertion, mild.  No pain with side to side heel squeeze.  Patient is ambulatory without assistive device or brace.  No gross ankle deformity noted.  No foot or ankle joint effusion is noted.     ASSESSMENT: L heel pain, suspect plantar fascia tear     PLAN:  Reviewed patient's chart in epic.  Discussed condition and treatment options including pros and cons.    Pt improved.  Likely has some residual plantar fasciitis.  May transition to supportive athletic shoes as tolerated.  Advised icing, stretching, superfeet inserts.  Avoid barefoot walking.  Discussed a gradual return to activity.  F/u prn.    Raúl Macias DPM, FACFAS    Weight management plan: Patient was referred to their PCP to discuss a diet and exercise plan.      "

## 2019-12-16 NOTE — LETTER
"    12/16/2019         RE: Esteban Casillas  [address] [place] [id number]        Dear Colleague,    Thank you for referring your patient, Esteban Casillas, to the [facility]. Please see a copy of my visit note below.    PATIENT HISTORY:  Esteban Casillas is a 48 year old male who presents to clinic for recheck of L heel pain, suspected plantar fascia injury/tear.  Doing much better.  He is back in shoes.  The Aircast boot helped.  Injury was over 2 wks ago.  He has some pain in the AM, plantar heel.  No fevers, chills, limping.  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     EXAM:Vitals: /82   Pulse 74   Ht 1.93 m (6' 4\")   Wt 106.6 kg (235 lb)   BMI 28.61 kg/m     BMI= Body mass index is 28.61 kg/m .    General appearance: Patient is alert and fully cooperative with history & exam.  No sign of distress is noted during the visit.     Dermatologic: Skin is intact to L foot without significant lesions, rash or abrasion.  No paronychia or evidence of soft tissue infection is noted.     Vascular: DP & PT pulses are intact & regular on the L.  No significant edema or varicosities noted.  CFT and skin temperature are normal.     Neurologic: Lower extremity sensation is intact to light touch.  No evidence of weakness or contracture in the lower extremities.  No evidence of neuropathy.     Musculoskeletal: L plantar heel pain at fascial insertion, mild.  No pain with side to side heel squeeze.  Patient is ambulatory without assistive device or brace.  No gross ankle deformity noted.  No foot or ankle joint effusion is noted.     ASSESSMENT: L heel pain, suspect plantar fascia tear     PLAN:  Reviewed patient's chart in epic.  Discussed condition and treatment options including pros and cons.    Pt improved.  Likely has some residual plantar fasciitis.  May transition to supportive athletic shoes as tolerated.  Advised icing, stretching, superfeet inserts.  Avoid barefoot " walking.  Discussed a gradual return to activity.  F/u prn.    Ralú Macias DPM, FACFAS    Weight management plan: Patient was referred to their PCP to discuss a diet and exercise plan.        Again, thank you for allowing me to participate in the care of your patient.        Sincerely,        Raúl Macias DPM

## 2019-12-16 NOTE — PATIENT INSTRUCTIONS
Thank you for choosing [facility]!    Follow up as needed    DR. ALEJANDRE'S CLINIC LOCATIONS     MONDAY  [place] TUESDAY & FRIDAY AM  [place]   [address] [facility]   [address]   [place] [address] DEDRICK Quezada [address]   [phone]  [phone]  [phone]       WEDNESDAY  [place] SCHEDULE SURGERY: [phone]   [address] [place] [address] APPOINTMENTS: [phone]   DEDRICK Smalls [address] BILLING QUESTIONS: [phone]   [phone]       PLANTAR FASCIITIS  Plantar fasciitis is often referred to as heel spurs or heel pain. Plantar fasciitis is a very common problem that affects people of all foot shapes, age, weight and activity level. Pain may be in the arch or on the weight-bearing surface of the heel. The pain may come on without injury or identifiable cause. Pain is generally present when first getting out of bed in the morning or up from a seated break.     CAUSES  The plantar fascia is a dense fibrous band of tissue that stretches across the bottom surface of the foot. The fascia helps support the foot muscles and arch. Plantar fasciitis is thought to be caused by mechanical strain or overload. Frequent walking without shoes or wearing unsupportive shoes is thought to cause structural overload and ultimately inflammation of the plantar fascia. Some people have heel spurs that can be seen on x-ray. The heel spur is actually a minor component of plantar fascitis and is largely ignored.       SELF TREATMENT   The easiest solution is to stop walking around your home without shoes. Plantar fasciitis is largely a shoe problem. Shoes are either not being worn often enough or your current shoes are inadequate for your weight, foot structure or activity level. The majority of shoes on the market today are not sufficient to resist development of plantar fasciitis or to promote healing. Assume that your current shoes are  inadequate and will need to be replaced. Even high quality shoes wear out with 6 months to one year of frequent use. Weight loss is another option. Losing ten pounds in the next two months may be enough to resolve the problem. Ice applied to the area of pain two to three times per day for ten minutes each session can be very helpful. Warm foot soaks in epsom salts can also relieve pain. This should continue until the problem resolves. Achilles tendon stretching is essential. Stretch multiple times daily to promote healing and to prevent recurrence in the future. Over all stretching of the body is helpful as well such as the calves, thighs and lower back. Normally when one area of the body is tight, other areas are too. Gentle Yoga can be good for this.     Over the counter topical anti inflammatories can be helpful such as biofreeze, bengay, salon pas, ect...  Oral ibuprofen or aleve is recommended as well to try to calm down inflammation.     Night splints can be helpful to gradually stretch the foot at night as a lot of pain is when you get up in the morning. Taking a towel or thera band and stretching the foot back multiple times before you get ou of bed can be beneficial as well.     MEDICAL TREATMENT  Medical treatments often include custom arch supports, cortisone injections, physical therapy, splints to be worn in bed, prescription medications and surgery. The home treatments listed above will be necessary regardless of these advanced medical treatments. Surgery is rarely needed but is very helpful in selected cases.     PROGNOSIS  Plantar fasciitis can last from one day to a lifetime. Some people get intermittent fascitis that is very short-lived. Others suffer daily for years. Excessive body weight, frequent bare foot walking, long hours on the feet, inadequate shoes, predisposing foot structures and excessive activity such as running are all potential issues that lead to chronic and/or recurring plantar  fascitis. Having plantar fasciitis means that you are forever prone to this problem and will require modification of some of the above factors. Most people seek treatment within one to four months. Healing usually requires a similar one to four month time frame. Healing time is relative to the amount of effort spent treating the problem.   Plantar fasciitis is highly recurrent. Risk factors often continue, including return to bare foot walking, inadequate shoes, excessive body weight, excessive activities, etc. Your life style and foot structure may predispose you to recurrent plantar fasciitis. A daily prevention regimen can be very helpful. Ongoing use of shoe inserts, careful attention to appropriate shoes, daily Achilles stretching, etc. may prevent recurrence. Prompt attention at the earliest warning signs of heel pain can resolve the problem in as short as a few days.     EXERCISES  Stair Exercise: Step on the stairs with the ball of your foot and hold your position for at least 15 seconds, then slowly step down with the heels of your foot. You can do this daily and as often as you want.   Picking the Towel: Sit comfortably and then pick the towel up with your toes. You can use any object other than a towel as long as the material can be soft and you can pick it up with your toes.  Rolling the Bottle: Use a small ball or frozen water bottle and then roll it around with your foot.   Flex the Toes: Sit comfortably and then flex your toes by pointing it towards the floor or towards your body. This will relax and flex your foot and exercise your plantar fascia, the calf, and the Achilles tendon. The inability of the foot to stretch often causes the bunching up of the plantar fascia area leading to the pain.  Calf/Achilles Stretching: Lay on you back and raise one foot, then point your toes towards the floor. See photo below:               Hold each stretch for 10 seconds. Stretch 10 times per set, three sets per  day. Morning, afternoon and evening. If your heel pain is very severe in the morning, consider doing the first set of stretches before you get out of bed.      OVER THE COUNTER INSERT RECOMMENDATIONS  SuperFeet   Sofsole Fit Spenco   Power Step   Walk-Fit Arch Cradles     Most of these can be found at your local [employer], [employer], or online.  **A good high quality over the counter insert should cost around $40-$50      [employer] LOCATIONS  [place]  [address] [facility]  [phone]   [address]  [phone] [place]  [address] [facility]  [phone]   [place]  [address]  [phone]   [place]  [address] [facility]  [phone] [place]  [address]  [phone]   [place]  [address]  [phone]   [place]  [address] [place] [address]  [phone] [place]  [address]  [phone]             BODY WEIGHT AND YOUR FEET  The following information is included in the after visit summary for all patients. Body weight can be a sensitive issue to discuss in clinic, but we think the following information is very important. Although we focus on the feet and ankles, we do support the overall health of our patients.     Many things can cause foot and ankle problems. Foot structure, activity level, foot mechanics and injuries are common causes of pain. One very important issue that often goes unmentioned, is body weight. Extra weight can cause increased stress on muscles, ligaments, bones and tendons. Sometimes just a few extra pounds is all it takes to put one over her/his threshold. Without reducing that stress, it can be difficult to alleviate pain. As Foot & Ankle specialists, our job is addressing the lower extremity problem and possible causes. Regarding extra body weight, we encourage patients to discuss diet and weight management plans with their primary care doctors. It is this team approach that gives you the best  opportunity for pain relief and getting you back on your feet.      [place] has a Comprehensive Weight Management Program. This program includes counseling, education, non-surgical and surgical approaches to weight loss. If you are interested in learning more either talk to you primary care provider or call [phone].

## 2022-01-01 ENCOUNTER — HOSPITAL ENCOUNTER (OUTPATIENT)
Dept: SPEECH THERAPY | Facility: CLINIC | Age: 52
Discharge: HOME | End: 2022-12-28
Payer: COMMERCIAL

## 2022-01-01 ENCOUNTER — HOSPITAL ENCOUNTER (OUTPATIENT)
Dept: SPEECH THERAPY | Facility: CLINIC | Age: 52
Discharge: HOME | End: 2022-11-23
Payer: COMMERCIAL

## 2022-01-01 ENCOUNTER — HOSPITAL ENCOUNTER (OUTPATIENT)
Dept: SPEECH THERAPY | Facility: CLINIC | Age: 52
Discharge: HOME | End: 2022-12-14
Payer: COMMERCIAL

## 2022-01-01 ENCOUNTER — HOSPITAL ENCOUNTER (OUTPATIENT)
Dept: SPEECH THERAPY | Facility: CLINIC | Age: 52
Discharge: HOME | End: 2022-12-13
Payer: COMMERCIAL

## 2022-01-01 ENCOUNTER — HOSPITAL ENCOUNTER (OUTPATIENT)
Dept: SPEECH THERAPY | Facility: CLINIC | Age: 52
Discharge: HOME | End: 2022-12-06
Payer: COMMERCIAL

## 2022-01-01 ENCOUNTER — HOSPITAL ENCOUNTER (OUTPATIENT)
Dept: SPEECH THERAPY | Facility: CLINIC | Age: 52
Discharge: HOME | End: 2022-11-18
Payer: COMMERCIAL

## 2022-01-01 ENCOUNTER — TELEPHONE (OUTPATIENT)
Dept: NEUROLOGY | Facility: CLINIC | Age: 52
End: 2022-01-01

## 2022-01-01 ENCOUNTER — VIRTUAL VISIT (OUTPATIENT)
Dept: NEUROLOGY | Facility: CLINIC | Age: 52
End: 2022-01-01
Payer: COMMERCIAL

## 2022-01-01 ENCOUNTER — HOSPITAL ENCOUNTER (OUTPATIENT)
Dept: SPEECH THERAPY | Facility: CLINIC | Age: 52
Discharge: HOME | End: 2022-11-30
Payer: COMMERCIAL

## 2022-01-01 ENCOUNTER — HOSPITAL ENCOUNTER (OUTPATIENT)
Dept: SPEECH THERAPY | Facility: CLINIC | Age: 52
Discharge: HOME | End: 2022-12-02
Payer: COMMERCIAL

## 2022-01-01 ENCOUNTER — LAB (OUTPATIENT)
Dept: LAB | Facility: CLINIC | Age: 52
End: 2022-01-01
Payer: COMMERCIAL

## 2022-01-01 ENCOUNTER — HOSPITAL ENCOUNTER (OUTPATIENT)
Dept: SPEECH THERAPY | Facility: CLINIC | Age: 52
Discharge: HOME | End: 2022-11-22
Payer: COMMERCIAL

## 2022-01-01 ENCOUNTER — HOSPITAL ENCOUNTER (OUTPATIENT)
Dept: SPEECH THERAPY | Facility: CLINIC | Age: 52
Discharge: HOME | End: 2022-11-29
Payer: COMMERCIAL

## 2022-01-01 ENCOUNTER — HOSPITAL ENCOUNTER (OUTPATIENT)
Dept: SPEECH THERAPY | Facility: CLINIC | Age: 52
Discharge: HOME | End: 2022-12-30
Payer: COMMERCIAL

## 2022-01-01 ENCOUNTER — HOSPITAL ENCOUNTER (OUTPATIENT)
Dept: SPEECH THERAPY | Facility: CLINIC | Age: 52
Discharge: HOME | End: 2022-11-25
Payer: COMMERCIAL

## 2022-01-01 ENCOUNTER — HOSPITAL ENCOUNTER (OUTPATIENT)
Dept: SPEECH THERAPY | Facility: CLINIC | Age: 52
Discharge: HOME | End: 2022-12-27
Payer: COMMERCIAL

## 2022-01-01 DIAGNOSIS — G11.8 SPINOCEREBELLAR ATAXIA (H): ICD-10-CM

## 2022-01-01 DIAGNOSIS — R27.0 ATAXIA: Primary | ICD-10-CM

## 2022-01-01 DIAGNOSIS — R27.0 ATAXIA: ICD-10-CM

## 2022-01-01 DIAGNOSIS — R47.1 ATAXIC DYSARTHRIA: Primary | ICD-10-CM

## 2022-01-01 PROCEDURE — 81183 ATXN10 GENE DETC ABNOR ALLEL: CPT

## 2022-01-01 PROCEDURE — 92507 TX SP LANG VOICE COMM INDIV: CPT | Mod: GN | Performed by: SPEECH-LANGUAGE PATHOLOGIST

## 2022-01-01 PROCEDURE — 99214 OFFICE O/P EST MOD 30 MIN: CPT | Mod: 95 | Performed by: PSYCHIATRY & NEUROLOGY

## 2022-01-01 PROCEDURE — 81344 TBP GENE DETC ABNOR ALLELES: CPT

## 2022-01-01 PROCEDURE — 81178 ATXN1 GENE DETC ABNOR ALLELE: CPT

## 2022-01-01 PROCEDURE — 36415 COLL VENOUS BLD VENIPUNCTURE: CPT

## 2022-01-01 PROCEDURE — 92526 ORAL FUNCTION THERAPY: CPT | Mod: GN | Performed by: SPEECH-LANGUAGE PATHOLOGIST

## 2022-01-01 PROCEDURE — 81177 ATN1 GENE DETC ABNOR ALLELES: CPT | Mod: 90

## 2022-01-01 PROCEDURE — 81284 FXN GENE DETC ABNOR ALLELES: CPT

## 2022-01-01 PROCEDURE — 81243 FMR1 GEN ALY DETC ABNL ALLEL: CPT

## 2022-01-01 PROCEDURE — 81184 CACNA1A GEN DETC ABNOR ALLEL: CPT

## 2022-01-01 PROCEDURE — 81479 UNLISTED MOLECULAR PATHOLOGY: CPT

## 2022-01-01 PROCEDURE — 99000 SPECIMEN HANDLING OFFICE-LAB: CPT

## 2022-01-01 PROCEDURE — 81180 ATXN3 GENE DETC ABNOR ALLELE: CPT

## 2022-01-01 PROCEDURE — 81182 ATXN8OS GEN DETC ABNOR ALLEL: CPT

## 2022-01-01 PROCEDURE — 81181 ATXN7 GENE DETC ABNOR ALLELE: CPT

## 2022-01-01 PROCEDURE — 81179 ATXN2 GENE DETC ABNOR ALLELE: CPT

## 2022-06-23 ENCOUNTER — THERAPY VISIT (OUTPATIENT)
Dept: PHYSICAL THERAPY | Facility: CLINIC | Age: 52
End: 2022-06-23
Payer: COMMERCIAL

## 2022-06-23 DIAGNOSIS — M25.561 ACUTE PAIN OF RIGHT KNEE: ICD-10-CM

## 2022-06-23 DIAGNOSIS — Z98.890 STATUS POST INCISION AND DRAINAGE: ICD-10-CM

## 2022-06-23 PROCEDURE — 97161 PT EVAL LOW COMPLEX 20 MIN: CPT | Mod: GP | Performed by: PHYSICAL THERAPIST

## 2022-06-23 PROCEDURE — 97110 THERAPEUTIC EXERCISES: CPT | Mod: GP | Performed by: PHYSICAL THERAPIST

## 2022-06-23 ASSESSMENT — ACTIVITIES OF DAILY LIVING (ADL)
AS_A_RESULT_OF_YOUR_KNEE_INJURY,_HOW_WOULD_YOU_RATE_YOUR_CURRENT_LEVEL_OF_DAILY_ACTIVITY?: ABNORMAL
KNEE_ACTIVITY_OF_DAILY_LIVING_SUM: 58
SQUAT: ACTIVITY IS SOMEWHAT DIFFICULT
KNEE_ACTIVITY_OF_DAILY_LIVING_SCORE: 82.86
SWELLING: I DO NOT HAVE THE SYMPTOM
GO UP STAIRS: ACTIVITY IS NOT DIFFICULT
PAIN: I DO NOT HAVE THE SYMPTOM
HOW_WOULD_YOU_RATE_THE_OVERALL_FUNCTION_OF_YOUR_KNEE_DURING_YOUR_USUAL_DAILY_ACTIVITIES?: NORMAL
GO DOWN STAIRS: ACTIVITY IS NOT DIFFICULT
STIFFNESS: I HAVE THE SYMPTOM BUT IT DOES NOT AFFECT MY ACTIVITY
LIMPING: I HAVE THE SYMPTOM BUT IT DOES NOT AFFECT MY ACTIVITY
WALK: ACTIVITY IS SOMEWHAT DIFFICULT
KNEEL ON THE FRONT OF YOUR KNEE: ACTIVITY IS FAIRLY DIFFICULT
HOW_WOULD_YOU_RATE_THE_CURRENT_FUNCTION_OF_YOUR_KNEE_DURING_YOUR_USUAL_DAILY_ACTIVITIES_ON_A_SCALE_FROM_0_TO_100_WITH_100_BEING_YOUR_LEVEL_OF_KNEE_FUNCTION_PRIOR_TO_YOUR_INJURY_AND_0_BEING_THE_INABILITY_TO_PERFORM_ANY_OF_YOUR_USUAL_DAILY_ACTIVITIES?: 100
GIVING WAY, BUCKLING OR SHIFTING OF KNEE: I DO NOT HAVE THE SYMPTOM
RAW_SCORE: 58
STAND: ACTIVITY IS NOT DIFFICULT
WEAKNESS: THE SYMPTOM AFFECTS MY ACTIVITY SLIGHTLY
SIT WITH YOUR KNEE BENT: ACTIVITY IS MINIMALLY DIFFICULT
RISE FROM A CHAIR: ACTIVITY IS NOT DIFFICULT

## 2022-06-23 NOTE — PROGRESS NOTES
Physical Therapy Initial Evaluation  Subjective:    Patient Health History  Esteban Casillas being seen for Rehabilitation after knee surgery.     Problem began: 5/27/2022.   Problem occurred: Fall from a ladder   Pain is reported as 0/10 on pain scale.  General health as reported by patient is good.  Pertinent medical history includes: other. Other medical history details: Undiagnosed neurological symptoms inclusing dysarthria, dyspahfia, and ataxia.     Medical allergies: none.   Surgeries include:  Orthopedic surgery. Other surgery history details: R knee I&D 5/30/22.    Current medications:  None.    Current occupation is Unable to work.   Primary job tasks include:  Computer work, driving, lifting/carrying, prolonged standing and pushing/pulling.                  Therapist Generated HPI Evaluation  Problem details: Pt presents to PT s/p I&D R prepatellar bursa on 5/30/22 after a fall off a ladder at home with subsequent infection after initial wound closure. Pt doing well and has adhered to hinged brace and recently allowed to ambulated with hinged brace unlocked. Pt referred to PT for gentle ROM.   .         Type of problem:  Right knee.    This is a new condition.  Condition occurred with:  A fall/slip (fall off of a ladder, cut knee open ).  Where condition occurred: at home.  Patient reports pain:  Anterior.        Associated symptoms:  Loss of motion/stiffness, loss of strength and edema. Symptoms are exacerbated by bending/squatting, kneeling and walking      Previous treatment includes surgery.   Restrictions due to condition include:  Currently not working due to present treatment.  Barriers include:  Other.                        Objective:    Gait:  Poor fit of the brace, causes decreased ability to flex right knee. Compensaion with right hip circumduction.    Weight Bearing Status:  WBAT   Assistive Devices:  Brace                                                        Knee Evaluation:  ROM:       PROM      Extension: Left:   Right:  Lacking 3  Flexion: Left:   Right:  115      Strength:           Quad Set Right:  Good    Pain: -                  General     ROS    Assessment/Plan:    Patient is a 51 year old male with right side knee complaints.    Patient has the following significant findings with corresponding treatment plan.                Diagnosis 1:  S/p I&D R prepatellar bursa  Decreased ROM/flexibility - manual therapy and therapeutic exercise  Decreased joint mobility - manual therapy and therapeutic exercise  Decreased strength - therapeutic exercise and therapeutic activities  Impaired gait - gait training  Impaired muscle performance - neuro re-education  Decreased function - therapeutic activities  Instability -  Therapeutic Activity  Therapeutic Exercise    Therapy Evaluation Codes:     Cumulative Therapy Evaluation is: Low complexity.    Previous and current functional limitations:  (See Goal Flow Sheet for this information)    Short term and Long term goals: (See Goal Flow Sheet for this information)     Communication ability:  Patient appears to be able to clearly communicate and understand verbal and written communication and follow directions correctly.  Treatment Explanation - The following has been discussed with the patient:   RX ordered/plan of care  Anticipated outcomes  Possible risks and side effects  This patient would benefit from PT intervention to resume normal activities.   Rehab potential is excellent.    Frequency:  1 X week, once daily  Duration:  for 6 weeks tapering to 2 X a month over 2 months  Discharge Plan:  Achieve all LTG.  Independent in home treatment program.  Reach maximal therapeutic benefit.    Please refer to the daily flowsheet for treatment today, total treatment time and time spent performing 1:1 timed codes.

## 2022-06-28 ENCOUNTER — THERAPY VISIT (OUTPATIENT)
Dept: PHYSICAL THERAPY | Facility: CLINIC | Age: 52
End: 2022-06-28
Payer: COMMERCIAL

## 2022-06-28 DIAGNOSIS — M25.561 RIGHT KNEE PAIN: Primary | ICD-10-CM

## 2022-06-28 DIAGNOSIS — Z98.890 STATUS POST INCISION AND DRAINAGE: ICD-10-CM

## 2022-06-28 PROCEDURE — 97110 THERAPEUTIC EXERCISES: CPT | Mod: GP | Performed by: PHYSICAL THERAPIST

## 2022-06-28 PROCEDURE — 97112 NEUROMUSCULAR REEDUCATION: CPT | Mod: GP | Performed by: PHYSICAL THERAPIST

## 2022-06-30 NOTE — TELEPHONE ENCOUNTER
6/30/2022-Request for images faxed to [facility]-MR @ 1241pm    7/18/2022-[facility] Images now in PACS-MR @ 533am      FUTURE VISIT INFORMATION      FUTURE VISIT INFORMATION:    Date: 7/21/2022    Time: 11am    Location: [facility]  REFERRAL INFORMATION:    Referring provider:  Self     Referring providers clinic:      Reason for visit/diagnosis  Consult     RECORDS REQUESTED FROM:       Clinic name Comments Records Status Imaging Status   [place] Dr. Gardner-4/28/2022    Dr. Bonilla-4/28/2022, 4/13/2022 Care Everywhere No Images          [facility] MR Head-6/28/2022, 3/30/2022    [facility] Cervical Spine-6/28/2022 Care Everywhere Requested to PACS

## 2022-07-06 ENCOUNTER — THERAPY VISIT (OUTPATIENT)
Dept: PHYSICAL THERAPY | Facility: CLINIC | Age: 52
End: 2022-07-06
Payer: COMMERCIAL

## 2022-07-06 DIAGNOSIS — M25.561 RIGHT KNEE PAIN: Primary | ICD-10-CM

## 2022-07-06 DIAGNOSIS — Z98.890 STATUS POST INCISION AND DRAINAGE: ICD-10-CM

## 2022-07-06 PROCEDURE — 97110 THERAPEUTIC EXERCISES: CPT | Mod: GP | Performed by: PHYSICAL THERAPIST

## 2022-07-06 PROCEDURE — 97140 MANUAL THERAPY 1/> REGIONS: CPT | Mod: GP | Performed by: PHYSICAL THERAPIST

## 2022-07-06 PROCEDURE — 97530 THERAPEUTIC ACTIVITIES: CPT | Mod: GP | Performed by: PHYSICAL THERAPIST

## 2022-07-13 ENCOUNTER — THERAPY VISIT (OUTPATIENT)
Dept: PHYSICAL THERAPY | Facility: CLINIC | Age: 52
End: 2022-07-13
Payer: COMMERCIAL

## 2022-07-13 DIAGNOSIS — M25.561 ACUTE PAIN OF RIGHT KNEE: Primary | ICD-10-CM

## 2022-07-13 DIAGNOSIS — Z98.890 STATUS POST INCISION AND DRAINAGE: ICD-10-CM

## 2022-07-13 PROCEDURE — 97140 MANUAL THERAPY 1/> REGIONS: CPT | Mod: GP | Performed by: PHYSICAL THERAPIST

## 2022-07-13 PROCEDURE — 97530 THERAPEUTIC ACTIVITIES: CPT | Mod: GP | Performed by: PHYSICAL THERAPIST

## 2022-07-13 PROCEDURE — 97110 THERAPEUTIC EXERCISES: CPT | Mod: GP | Performed by: PHYSICAL THERAPIST

## 2022-07-20 ENCOUNTER — THERAPY VISIT (OUTPATIENT)
Dept: PHYSICAL THERAPY | Facility: CLINIC | Age: 52
End: 2022-07-20
Payer: COMMERCIAL

## 2022-07-20 DIAGNOSIS — M25.561 RIGHT KNEE PAIN: Primary | ICD-10-CM

## 2022-07-20 DIAGNOSIS — Z98.890 STATUS POST INCISION AND DRAINAGE: ICD-10-CM

## 2022-07-20 PROCEDURE — 97110 THERAPEUTIC EXERCISES: CPT | Mod: GP | Performed by: PHYSICAL THERAPIST

## 2022-07-20 PROCEDURE — 97112 NEUROMUSCULAR REEDUCATION: CPT | Mod: GP | Performed by: PHYSICAL THERAPIST

## 2022-07-20 ASSESSMENT — ACTIVITIES OF DAILY LIVING (ADL)
WALK: ACTIVITY IS MINIMALLY DIFFICULT
WEAKNESS: I DO NOT HAVE THE SYMPTOM
GO DOWN STAIRS: ACTIVITY IS NOT DIFFICULT
HOW_WOULD_YOU_RATE_THE_OVERALL_FUNCTION_OF_YOUR_KNEE_DURING_YOUR_USUAL_DAILY_ACTIVITIES?: NORMAL
GO UP STAIRS: ACTIVITY IS NOT DIFFICULT
KNEE_ACTIVITY_OF_DAILY_LIVING_SCORE: 95.71
SWELLING: I DO NOT HAVE THE SYMPTOM
KNEE_ACTIVITY_OF_DAILY_LIVING_SUM: 67
LIMPING: I DO NOT HAVE THE SYMPTOM
KNEEL ON THE FRONT OF YOUR KNEE: ACTIVITY IS SOMEWHAT DIFFICULT
HOW_WOULD_YOU_RATE_THE_CURRENT_FUNCTION_OF_YOUR_KNEE_DURING_YOUR_USUAL_DAILY_ACTIVITIES_ON_A_SCALE_FROM_0_TO_100_WITH_100_BEING_YOUR_LEVEL_OF_KNEE_FUNCTION_PRIOR_TO_YOUR_INJURY_AND_0_BEING_THE_INABILITY_TO_PERFORM_ANY_OF_YOUR_USUAL_DAILY_ACTIVITIES?: 100
SQUAT: ACTIVITY IS NOT DIFFICULT
SIT WITH YOUR KNEE BENT: ACTIVITY IS NOT DIFFICULT
RAW_SCORE: 67
AS_A_RESULT_OF_YOUR_KNEE_INJURY,_HOW_WOULD_YOU_RATE_YOUR_CURRENT_LEVEL_OF_DAILY_ACTIVITY?: ABNORMAL
GIVING WAY, BUCKLING OR SHIFTING OF KNEE: I DO NOT HAVE THE SYMPTOM
RISE FROM A CHAIR: ACTIVITY IS NOT DIFFICULT
STAND: ACTIVITY IS NOT DIFFICULT
PAIN: I DO NOT HAVE THE SYMPTOM
STIFFNESS: I DO NOT HAVE THE SYMPTOM

## 2022-07-20 NOTE — PROGRESS NOTES
Discharge Note    Progress reporting period is from initial evaluation date (please see noted date below) to Jul 20, 2022.  Linked Episodes   Type: Episode: Status: Noted: Resolved: Last update: Updated by:   PHYSICAL THERAPY R knee pain  Active 6/23/2022 7/20/2022  9:50 AM Tova Dempsey      Comments:       Esteban was seen for 5 visits s/p I&D R prepatellar bursa on 5/30/22 after a fall off a ladder at home with subsequent infection after initial wound closure. Pt has made good progress and denies any pain at this time. Pt appropriate for discharge home with continued HEP and plans for eventual gym routine. Pt to f/u as needed    SUBJECTIVE  Subjective changes noted by patient:  Pt reports doing very well and is pleased with his progress. Ortho cleared him for all activities as tolerated and patient will return to the gym soon. Would like to discuss independent HEP  .  Current pain level is 0/10.     Previous pain level was  0/10.   Changes in function:  Yes (See Goal flowsheet attached for changes in current functional level)  Adverse reaction to treatment or activity: None    OBJECTIVE  Changes noted in objective findings: Painfree WNL Knee AROM/PROM. Improved SL step down control but decreased strength compared to L SLS remains. Discussed gym routine and plans for continued strengthening w/ HEP. Pt to f/u as needed     ASSESSMENT/PLAN  Diagnosis: s/p I&D R prepatellar bursa   Updated problem list and treatment plan:   Decreased strength - HEP  STG/LTGs have been met or progress has been made towards goals:  Yes, please see goal flowsheet for most current information  Assessment of Progress: current status is unknown.    Last current status:     Self Management Plans:  HEP  I have re-evaluated this patient and find that the nature, scope, duration and intensity of the therapy is appropriate for the medical condition of the patient.  Esteban continues to require the following intervention to meet STG and LTG's:   HEP.    Recommendations:  Discharge with current home program.  Patient to follow up with MD as needed.    Please refer to the daily flowsheet for treatment today, total treatment time and time spent performing 1:1 timed codes.

## 2022-07-21 ENCOUNTER — PRE VISIT (OUTPATIENT)
Dept: NEUROLOGY | Facility: CLINIC | Age: 52
End: 2022-07-21

## 2022-07-21 ENCOUNTER — TELEPHONE (OUTPATIENT)
Dept: NEUROLOGY | Facility: CLINIC | Age: 52
End: 2022-07-21

## 2022-07-21 ENCOUNTER — VIRTUAL VISIT (OUTPATIENT)
Dept: NEUROLOGY | Facility: CLINIC | Age: 52
End: 2022-07-21
Payer: COMMERCIAL

## 2022-07-21 DIAGNOSIS — R13.10 DYSPHAGIA, UNSPECIFIED TYPE: Primary | ICD-10-CM

## 2022-07-21 DIAGNOSIS — R49.0 DYSPHONIA: ICD-10-CM

## 2022-07-21 DIAGNOSIS — G12.20: ICD-10-CM

## 2022-07-21 PROCEDURE — 99417 PROLNG OP E/M EACH 15 MIN: CPT | Mod: 95 | Performed by: PSYCHIATRY & NEUROLOGY

## 2022-07-21 PROCEDURE — 99205 OFFICE O/P NEW HI 60 MIN: CPT | Mod: 95 | Performed by: PSYCHIATRY & NEUROLOGY

## 2022-07-21 RX ORDER — CEPHALEXIN 500 MG/1
500 CAPSULE ORAL 3 TIMES DAILY
COMMUNITY
Start: 2022-06-09 | End: 2022-08-05

## 2022-07-21 RX ORDER — HYDROXYZINE PAMOATE 25 MG/1
1 CAPSULE ORAL PRN
COMMUNITY
Start: 2022-06-06 | End: 2022-08-05

## 2022-07-21 RX ORDER — FLUTICASONE PROPIONATE 50 MCG
2 SPRAY, SUSPENSION (ML) NASAL 2 TIMES DAILY
COMMUNITY
Start: 2015-01-01

## 2022-07-21 RX ORDER — ALBUTEROL SULFATE 90 UG/1
2 AEROSOL, METERED RESPIRATORY (INHALATION) EVERY 4 HOURS PRN
COMMUNITY
Start: 2021-12-06 | End: 2022-08-05

## 2022-07-21 RX ORDER — ASPIRIN 325 MG
325 TABLET ORAL DAILY
COMMUNITY
Start: 2022-06-02 | End: 2022-08-05

## 2022-07-21 RX ORDER — AZELASTINE 1 MG/ML
1 SPRAY, METERED NASAL DAILY
COMMUNITY
Start: 2021-09-07

## 2022-07-21 RX ORDER — BUDESONIDE 0.25 MG/2ML
0.25 INHALANT ORAL 2 TIMES DAILY
COMMUNITY
Start: 2021-10-11 | End: 2022-08-05

## 2022-07-21 RX ORDER — HYDROCODONE BITARTRATE AND ACETAMINOPHEN 5; 325 MG/1; MG/1
1-2 TABLET ORAL EVERY 4 HOURS PRN
COMMUNITY
Start: 2022-05-27 | End: 2022-08-05

## 2022-07-21 RX ORDER — PANTOPRAZOLE SODIUM 40 MG/1
40 TABLET, DELAYED RELEASE ORAL 2 TIMES DAILY
COMMUNITY
Start: 2022-05-02

## 2022-07-21 RX ORDER — SULFAMETHOXAZOLE/TRIMETHOPRIM 800-160 MG
1 TABLET ORAL SEE ADMIN INSTRUCTIONS
COMMUNITY
Start: 2022-06-01 | End: 2022-08-05

## 2022-07-21 RX ORDER — MONTELUKAST SODIUM 10 MG/1
1 TABLET ORAL DAILY
COMMUNITY
Start: 2022-02-25 | End: 2022-08-05

## 2022-07-21 RX ORDER — CYANOCOBALAMIN 1000 UG/ML
1 INJECTION, SOLUTION INTRAMUSCULAR; SUBCUTANEOUS SEE ADMIN INSTRUCTIONS
COMMUNITY
Start: 2022-05-02 | End: 2022-08-05

## 2022-07-21 RX ORDER — ALUMINUM HYDROXIDE AND MAGNESIUM TRISILICATE 80; 14.2 MG/1; MG/1
1-2 TABLET, CHEWABLE ORAL 3 TIMES DAILY PRN
COMMUNITY

## 2022-07-21 RX ORDER — CETIRIZINE HYDROCHLORIDE 10 MG/1
10 TABLET ORAL DAILY
COMMUNITY

## 2022-07-21 NOTE — Clinical Note
Jim Jeter,  I just saw this gentleman through a virtual visit as a second opinion.  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  He needs a repeat EMG, and was hoping to try and expedite things for him given his pulmonary issues noted on exam and clinically.  Do you have any emergency slots for EMG w/in the department for a case like this?  Thank you, FABIEN

## 2022-07-21 NOTE — PROGRESS NOTES
Esteban is a 51 year old who is being evaluated via a billable video visit.      How would you like to obtain your AVS? Mychart  If the video visit is dropped, the invitation should be resent by: [phone]        Video-Visit Details    Video Start Time: 1100    Type of service:  Video Visit    Video End Time:12:35 PM    Originating Location (pt. Location): Home    Distant Location (provider location):  [facility] NEUROLOGY [facility]     Platform used for Video Visit: Amerityre

## 2022-07-21 NOTE — PROGRESS NOTES
[facility] Neurology Consultation    Esteban Casillas MRN# [id number]   Age: 51 year old YOB: 1970     Requesting physician: No ref. provider found  Bernice Talavera     Reason for Consultation: dysarthria      History of Presenting Symptoms:   Esteban Casillas is a 51 year old male who presents today for evaluation of dysarthria.      Chart reviewed, pertinent visits:  4/13/2022, 4/28/2022 - Dr. Bonilla of [facility] group:    - Cough since 2021 leading to stricture dilatation and GERD diagnosis. In the fall of 2021, dysphagia and slurred speech became more noticeable. Worsened speech towards the end of the day, effortful speech described. Dysphagia was with food and water.  Spouse thought his walking had slowed somewhat, there was no tremor.  There was some anger outbursts, but not emotional lability with generally neutral stimuli.  No change in personality. No memory issues.  There was noted family history of his father having dementia in his late 60's, passing at age 74 ( He forgot everything, no behavioral changes).  Exam showed dysarthria that was both spastic and ataxic, but no signs of tongue fasciculations.  There was LUE weakness noted distally (1/5).  Reflexes were increased.  Movements were generally fluid. Gait was normal.  Given his dysarthria, dysphagia, hyper-reflexia, and LUE weakness he was to have an EMG to look for motor neuron disease, a swallow study, and serum studies.   - Hexosamindiase A and total, Zinc, Copper, Paraneoplastic, HIV, Lyme, CBC, TSH, CK, CMP, ceruloplasmin, Manganese, ESR, Celiac, tTG, B1, Vitamin E ~ normal    - B12 was low (147), Hgb and hematocrit was elevated    - CCP criss was strongly positive (208.7)   -  Swallow (video esophagram) showed  hiatal hernia, and flash laryngeal penetration of thin liquid consistency. Esophageal dysmotility noted.   - EMG dated 4/27/2022 was indicated to be normal   - MRI brain and cervical spine 6/28/2022 were without  "pertinent findings when reviewed on PACS (mild possible L-C7 nerve root impingement, no myelopathy.   - CSF testing 7/11/2022 ~ movement disorder/autoimmune panel was normal.  Protein was slightly elevated (38). Cell count and Glucose was normal.    The patient did have a fall 5/27/2022 from a ladder where he lacerated his right knee.  He developed fevers afterwards and then was seen 5/28/2022 given worsened soft tissue issues around his knee.  He required I&D 5/30/2022.  During his periods of fever, he had some delirum noted by prviders.    Today, the patient's speech and swallowing issues are his largest issues.  Water goes down the wrong way too much, liquids are quite difficult.  Foods aren't actually an issue.  His speaking pattern is changed.  It is slowed, effortful, and nasal to some degree.  He feels his vocal muscle are weaker by the day, and by 9 pm in the evening he cannot get words out at all. He has never noted muscle twitching himself, but he notes that a provider during admission in 5/30/2022 there was fasciculations of the tongue.  They have noted that his tongue is weak, he cannot puff out his cheeks, he cannot raise his eyebrows easily.      The patient's wife indicates that he has had ome behavioral changes, such as mild mood swings.  There are issues with problem solving, and anticipating what an outcome may be for a specific action reported by the patient.  He describes this as having difficulty planing for the next step in things.  There is no report of visual hallucinations, atypical movements, LOC, odd social behaviors or change in personality.  No major exposures to inhalants or well water noted.     Physical Exam:   General: Seated comfortably in no acute distress.  Cannot get to \"j\" when saying alphabet in one breath.  Can count to 8-9 on one breath.  Neurologic:     Mental Status and Cranial Nerves: Strained ataxic and spastic speech. Cah phonemes are most difficult to produce rapidly, " "but really all phonemes are somewhat slowed and slurred. Prolonged \"e\" sound is broken and fluctuates in tone. Full vertical and horizontal eye movements noted without intrusions or nystagmus or bobbing.  Rapid eye blinking is difficult. Eyebrow elevation is easy to do for a short burst, but he cannot sustain this action b/l.  Eyelid closure and opening are easy.  Opens and closes mouth easily but not rapidly. Cannot puff cheeks out with air. Turns head easily, shrugs shoulders easily. Rapid or even slow motions of the tongue are incoordinated and weakened.  Could not evaluate uvula elevation.       Motor: Arms can go above head, can open and close hands easily, fine motor movements between fingers is quick and without issue.       Coordination: Finger-nose-finger without dysmetria or ataxia.          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  I am limited today due to video, but his speaking deficits and weakness of the facial musculature including tongue are enough to warrant repeat EMG with one of our neuromuscular providers.  I am also concerned about his diaphragm and breathing muscles being involved, as he was unable to get to \"J\" when saying the alphabet on one breath, and indicated being out of breath for minimal activities. I think no matter the result of the EMG, he would benefit from being seen with a neuromuscular providers going forward and I will place this referral.  If his EMG is without changes, I would focus on testing for atypical myasthenic syndromes and probably repeat testing of the CSF and image the brain again for paraneoplastic and autoimmune related disorders that can lead to brainstem deficits or multiple cranial " neuropathies.     Plan:  - Neuromuscular referral  - EMG repeat, bulbar musculature  - PFT to be done    Follow up in Neurology clinic in 2-3 months (pending neuromuscular opinion) or should new concerns arise.    FABIEN Mortensen D.O.   of Neurology    Total time today (103 min) in this patient encounter was spent on pre-charting, counseling and/or coordination of care.The patient is in agreement with this plan and has no further questions.

## 2022-07-21 NOTE — TELEPHONE ENCOUNTER
[facility] Call Center    Phone Message    May a detailed message be left on voicemail: yes     Reason for Call: Appointment Intake    Referring Provider Name: Tony Hedrick  Diagnosis and/or Symptoms: Neuromuscular/bulbar ALS    Please review and assist, per protocol send TE for ALS    Action Taken: Message routed to:  [facility]rology    Travel Screening: Not Applicable

## 2022-07-21 NOTE — LETTER
7/21/2022       RE: Esteban Casillas  [address] [place] [id number]     Dear Colleague,    Thank you for referring your patient, Esteban Casillas, to the [facility] NEUROLOGY CLINIC [facility]. Please see a copy of my visit note below.    [facility] Neurology Consultation    Esteban Casillas MRN# [id number]   Age: 51 year old YOB: 1970     Requesting physician: No ref. provider found  Bernice Talavera     Reason for Consultation: dysarthria      History of Presenting Symptoms:   Esteban Casillas is a 51 year old male who presents today for evaluation of dysarthria.      Chart reviewed, pertinent visits:  4/13/2022, 4/28/2022 - Dr. Bonilla of [facility] Neurology group:    - Cough since 2021 leading to stricture dilatation and GERD diagnosis. In the fall of 2021, dysphagia and slurred speech became more noticeable. Worsened speech towards the end of the day, effortful speech described. Dysphagia was with food and water.  Spouse thought his walking had slowed somewhat, there was no tremor.  There was some anger outbursts, but not emotional lability with generally neutral stimuli.  No change in personality. No memory issues.  There was noted family history of his father having dementia in his late 60's, passing at age 74 ( He forgot everything, no behavioral changes).  Exam showed dysarthria that was both spastic and ataxic, but no signs of tongue fasciculations.  There was LUE weakness noted distally (1/5).  Reflexes were increased.  Movements were generally fluid. Gait was normal.  Given his dysarthria, dysphagia, hyper-reflexia, and LUE weakness he was to have an EMG to look for motor neuron disease, a swallow study, and serum studies.   - Hexosamindiase A and total, Zinc, Copper, Paraneoplastic, HIV, Lyme, CBC, TSH, CK, CMP, ceruloplasmin, Manganese, ESR, Celiac, tTG, B1, Vitamin E ~ normal    - B12 was low (147), Hgb and  hematocrit was elevated    - CCP criss was strongly positive (208.7)   -  Swallow (video esophagram) showed  hiatal hernia, and flash laryngeal penetration of thin liquid consistency. Esophageal dysmotility noted.   - EMG dated 4/27/2022 was indicated to be normal   - MRI brain and cervical spine 6/28/2022 were without pertinent findings when reviewed on PACS (mild possible L-C7 nerve root impingement, no myelopathy.   - CSF testing 7/11/2022 ~ movement disorder/autoimmune panel was normal.  Protein was slightly elevated (38). Cell count and Glucose was normal.    The patient did have a fall 5/27/2022 from a ladder where he lacerated his right knee.  He developed fevers afterwards and then was seen 5/28/2022 given worsened soft tissue issues around his knee.  He required I&D 5/30/2022.  During his periods of fever, he had some delirum noted by prvjohnna.    Today, the patient's speech and swallowing issues are his largest issues.  Water goes down the wrong way too much, liquids are quite difficult.  Foods aren't actually an issue.  His speaking pattern is changed.  It is slowed, effortful, and nasal to some degree.  He feels his vocal muscle are weaker by the day, and by 9 pm in the evening he cannot get words out at all. He has never noted muscle twitching himself, but he notes that a provider during admission in 5/30/2022 there was fasciculations of the tongue.  They have noted that his tongue is weak, he cannot puff out his cheeks, he cannot raise his eyebrows easily.      The patient's wife indicates that he has had ome behavioral changes, such as mild mood swings.  There are issues with problem solving, and anticipating what an outcome may be for a specific action reported by the patient.  He describes this as having difficulty planing for the next step in things.  There is no report of visual hallucinations, atypical movements, LOC, odd social behaviors or change in personality.  No major exposures to inhalants  "or well water noted.     Physical Exam:   General: Seated comfortably in no acute distress.  Cannot get to \"j\" when saying alphabet in one breath.  Can count to 8-9 on one breath.  Neurologic:     Mental Status and Cranial Nerves: Strained ataxic and spastic speech. Cah phonemes are most difficult to produce rapidly, but really all phonemes are somewhat slowed and slurred. Prolonged \"e\" sound is broken and fluctuates in tone. Full vertical and horizontal eye movements noted without intrusions or nystagmus or bobbing.  Rapid eye blinking is difficult. Eyebrow elevation is easy to do for a short burst, but he cannot sustain this action b/l.  Eyelid closure and opening are easy.  Opens and closes mouth easily but not rapidly. Cannot puff cheeks out with air. Turns head easily, shrugs shoulders easily. Rapid or even slow motions of the tongue are incoordinated and weakened.  Could not evaluate uvula elevation.       Motor: Arms can go above head, can open and close hands easily, fine motor movements between fingers is quick and without issue.       Coordination: Finger-nose-finger without dysmetria or ataxia.          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  I am limited today due to video, but his speaking deficits and weakness of the facial musculature including tongue are enough to warrant repeat EMG with one of our neuromuscular providers.  I am also concerned about his diaphragm and breathing muscles being involved, as he was unable to get to \"J\" when saying the alphabet on one breath, and indicated being out of breath for minimal activities. I think no matter the result of the EMG, he would benefit from being seen with a neuromuscular providers going " forward and I will place this referral.  If his EMG is without changes, I would focus on testing for atypical myasthenic syndromes and probably repeat testing of the CSF and image the brain again for paraneoplastic and autoimmune related disorders that can lead to brainstem deficits or multiple cranial neuropathies.     Plan:  - Neuromuscular referral  - EMG repeat, bulbar musculature  - PFT to be done    Follow up in Neurology clinic in 2-3 months (pending neuromuscular opinion) or should new concerns arise.      FABIEN Mortensen D.O.   of Neurology      Total time today (103 min) in this patient encounter was spent on pre-charting, counseling and/or coordination of care.The patient is in agreement with this plan and has no further questions.

## 2022-07-22 NOTE — TELEPHONE ENCOUNTER
Spoke with patient to let him know we are attempting a sooner EMG and will then schedule for Thursday clinic follow-up.

## 2022-07-26 ENCOUNTER — OFFICE VISIT (OUTPATIENT)
Dept: NEUROLOGY | Facility: CLINIC | Age: 52
End: 2022-07-26
Payer: COMMERCIAL

## 2022-07-26 DIAGNOSIS — R49.0 DYSPHONIA: ICD-10-CM

## 2022-07-26 DIAGNOSIS — G12.20: ICD-10-CM

## 2022-07-26 DIAGNOSIS — R47.1 DYSARTHRIA: Primary | ICD-10-CM

## 2022-07-26 DIAGNOSIS — G56.22 ULNAR NEUROPATHY OF LEFT UPPER EXTREMITY: ICD-10-CM

## 2022-07-26 DIAGNOSIS — R13.10 DYSPHAGIA, UNSPECIFIED TYPE: ICD-10-CM

## 2022-07-26 PROCEDURE — 95887 MUSC TST DONE W/N TST NONEXT: CPT | Performed by: PSYCHIATRY & NEUROLOGY

## 2022-07-26 PROCEDURE — 95910 NRV CNDJ TEST 7-8 STUDIES: CPT | Performed by: PSYCHIATRY & NEUROLOGY

## 2022-07-26 PROCEDURE — 95937 NEUROMUSCULAR JUNCTION TEST: CPT | Performed by: PSYCHIATRY & NEUROLOGY

## 2022-07-26 PROCEDURE — 95885 MUSC TST DONE W/NERV TST LIM: CPT | Performed by: PSYCHIATRY & NEUROLOGY

## 2022-07-26 NOTE — LETTER
2022         RE: Esteban Casillas  [address] [place] [id number]        Dear Colleague,    Thank you for referring your patient, Esteban Casillas, to the [facility]. Please see a copy of my visit note below.                        [facility]  Electrodiagnostic Laboratory                 Department of Neurology                                                                                                         Test Date:  2022    Patient: Esteban Casillas : 1970 Physician: Rebecca Mcclain MD   Sex: Male AGE: 51 year Ref Phys: Dr Mortensen   ID#: [id number]   Technician: Kristy Behling     Clinical Information:  Referred for repeat EMG.  Patient has undergone EMG done at [facility] in April performed by Dr. Valencia and I have reviewed those prior to this appointment.  The patient has chronic progressive dysphonia/dysarthria with a lesser degree of dysphagia.    On brief examination the patient is noted to have some weakness with tongue movement side to side but no other facial weakness.  The patient has some tremulousness in the tongue but not a clear fasciculation pattern.  In addition the patient has a significant dysarthria with intonation changes as well as difficulty enunciating words.    Techniques:  Motor and sensory conduction studies were done with surface recording electrodes. EMG was done with a concentric needle electrode.     Results:   Motor nerve conduction studies:  Facial motor studies recording over nasalis are bilaterally symmetric and considered normal.  Left fibular, median motor studies are within normal limits.  Left ulnar motor reveals a prolonged distal latency, normal amplitude and normal conduction velocity.    Sensory studies:  Left sural sensory study is within normal limits.  Median and ulnar antidromic sensory's are within normal limits.    Repetitive stimulation of both abductor digiti minimi on the  left and nasalis are within normal limits with no significant decrement recorded.    Needle examination was essentially normal except for the following in the genioglossus muscle the patient has a reduction in recruitment.  In motor units that initially appeared normal.  I did explore 2 sites on the left and some of the units would be considered longer duration for genioglossus but only mildly so.  I do not appreciate any insertional abnormalities nor fasciculations.  In the left upper extremity the left abductor digit he minimi revealed some increased insertional activity with fibrillation potentials present.  Activation was normal.    Interpretation:  The EMG does have some mild abnormalities.  There is suggestion of a ulnar neuropathy in the left upper extremity only with motor fiber findings.  Isolated finding such as this are of unclear clinical significance.  In addition with needle examination of the tongue there does appear to be difficulty with recruitment.  There is no clear evidence of motor neuron disease and consideration for central nervous system dysfunction affecting recruitment should be considered.      ___________________________  Rebecca Mcclain MD        Nerve Conduction Studies  Motor Sites      Latency Amplitude Neg. Amp Diff Segment Distance Velocity Neg. Dur Neg Area Diff Temperature Comment   Site (ms) Norm (mV) Norm %  cm m/s Norm ms %  C    Left Facial - Nasalis Motor   Mastoid 3.6 - 1.84 -      5.0  21.7    Right Facial - Nasalis Motor   Mastoid 3.1 - 1.91 -      6.1  21.8    Left Fibular (EDB) Motor   Ankle 3.9  < 6.0 4.3  > 2.5  Ankle-EDB 8   5.8  28.5    Bel Fib Head 13.3 - 3.5 - -18.6 Bel Fib Head-Ankle 39 41  > 38 6.4 -11.9 28.7    Pop Fossa 15.3 - 3.6 - 2.9 Pop Fossa-Bel Fib Head 8 40  > 38 6.6 2.5 28.9    Left Median (APB) Motor   Wrist 4.2  < 4.4 7.0  > 5.0  Wrist-APB 8   6.1  30.1    Elbow 8.7 - 6.3 - -10.0 Elbow-Wrist 24 53  > 48 6.4 -7.4 30.2    Left Ulnar (ADM) Motor   Wrist  3.9  < 3.5 9.5  > 5.0  Wrist-ADM 8   5.3  28.5    Bel Elbow 7.7 - 8.6 - -9.5 Bel Elbow-Wrist 23 61  > 48 5.9 -1.12 29.3    Abv Elbow 10.0 - 8.4 - -2.3 Abv Elbow-Bel Elbow 8 35  > 48 5.6 -5.6 30.2      Sensory Sites      Onset Lat Peak Lat Amp (O-P) Amp (P-P) Segment Distance Velocity Temperature Comment   Site ms ms  V Norm  V  cm m/s Norm  C    Left Median Sensory   Wrist-Dig II 2.8 3.5 48  > 10 71 Wrist-Dig II 14 50  > 48 29.1    Left Sural Sensory   Calf-Lat Mall 2.9 3.6 9  > 5 11 Calf-Lat Mall 14 48  > 38 30.8    Left Ulnar Sensory   Wrist-Dig V 2.4 3.0 20  > 8 16 Wrist-Dig V 12.5 52  > 48 29.8      RNS     Trial # Label Amp 1 (mV)  O-P Amp 4 (mV)  O-P Amp % Dif Area 1 (mV ms) Area 4 (mV ms) Area % Dif Rep Rate Train Length Pause Time (min:sec) Comments   Left Abductor Digiti Minimi   Tr 1 Baseline 9.61 9.46 -1.5 27.62 26.89 -2.6 3.00 6 00:30    Tr 2 Post Exercise 10.99 10.94 -0.4 32.31 31.14 -3.6 3.00 6 01:00    Tr 3 1 min Post 9.96 9.81 -1.5 29.90 28.41 -5.0 3.00 6 01:00    Tr 4 2 min Post 9.79 9.94 1.5 29.03 28.88 -0.5 3.00 6 01:00    5 3 min Post       3.00 6 00:00    Left Nasalis   Tr 1 Baseline 1.68 1.67 -0.5 5.32 5.18 -2.6 3.00 6 00:30    Tr 2 Post Exercise 1.70 1.70 -0.0 5.07 4.98 -1.7 3.00 6 01:00    3 1 min Post       3.00 6 01:00    Tr 4 2 min Post 1.74 1.79 3.3 5.46 5.48 0.2 3.00 6 01:00    Tr 5 3 min Post 1.76 1.75 -0.6 5.59 5.41 -3.3 3.00 6 00:00        Electromyography     Side Muscle Ins Act Fibs/PSW Fasc HF Amp Dur Poly Recrt Int Pat   Left Frontalis Nml None Nml 0 Nml Nml 0 Nml Nml   Left Masseter Nml None Nml 0 Nml Nml 0 Nml Nml   Left Genioglossus Nml None Nml 0 Nml 1+ 0 ModRed Nml   Right Genioglossus Nml None Nml 0 Nml Nml 0 ModRed Nml   Left ADM Incr 1+ Nml 0 Nml Nml 0 Nml Nml   Left Biceps Nml None Nml 0 Nml Nml 0 Nml Nml   Left Triceps Nml None Nml 0 Nml Nml 0 Nml Nml   Left Deltoid Nml None Nml 0 Nml Nml 0 Nml Nml   Left C7 Parasp Nml None Nml 0            NCS Waveforms:    Motor                   Sensory                                 Again, thank you for allowing me to participate in the care of your patient.        Sincerely,        Rebecca Mcclain MD

## 2022-07-26 NOTE — Clinical Note
While I see some enlargement of units in tongue it doesn't match the amount of reduced recruitment. I feel this is likely CNS disease. Might be worthwhile asking Sara to see for ataxia assessment per Dong note

## 2022-07-26 NOTE — PROGRESS NOTES
[facility]  Electrodiagnostic Laboratory                 Department of Neurology                                                                                                         Test Date:  2022    Patient: Esteban Casillas : 1970 Physician: Rebecca Mcclain MD   Sex: Male AGE: 51 year Ref Phys: Dr Mortensen   ID#: [id number]   Technician: Kristy Behling     Clinical Information:  Referred for repeat EMG.  Patient has undergone EMG done at [facility] in April performed by Dr. Valencia and I have reviewed those prior to this appointment.  The patient has chronic progressive dysphonia/dysarthria with a lesser degree of dysphagia.    On brief examination the patient is noted to have some weakness with tongue movement side to side but no other facial weakness.  The patient has some tremulousness in the tongue but not a clear fasciculation pattern.  In addition the patient has a significant dysarthria with intonation changes as well as difficulty enunciating words.    Techniques:  Motor and sensory conduction studies were done with surface recording electrodes. EMG was done with a concentric needle electrode.     Results:   Motor nerve conduction studies:  Facial motor studies recording over nasalis are bilaterally symmetric and considered normal.  Left fibular, median motor studies are within normal limits.  Left ulnar motor reveals a prolonged distal latency, normal amplitude and normal conduction velocity.    Sensory studies:  Left sural sensory study is within normal limits.  Median and ulnar antidromic sensory's are within normal limits.    Repetitive stimulation of both abductor digiti minimi on the left and nasalis are within normal limits with no significant decrement recorded.    Needle examination was essentially normal except for the following in the genioglossus muscle the patient has a reduction in recruitment.  In motor units that initially appeared normal.   I did explore 2 sites on the left and some of the units would be considered longer duration for genioglossus but only mildly so.  I do not appreciate any insertional abnormalities nor fasciculations.  In the left upper extremity the left abductor digit he minimi revealed some increased insertional activity with fibrillation potentials present.  Activation was normal.    Interpretation:  The EMG does have some mild abnormalities.  There is suggestion of a ulnar neuropathy in the left upper extremity only with motor fiber findings.  Isolated finding such as this are of unclear clinical significance.  In addition with needle examination of the tongue there does appear to be difficulty with recruitment.  There is no clear evidence of motor neuron disease and consideration for central nervous system dysfunction affecting recruitment should be considered.      ___________________________  Rebecca Mcclain MD        Nerve Conduction Studies  Motor Sites      Latency Amplitude Neg. Amp Diff Segment Distance Velocity Neg. Dur Neg Area Diff Temperature Comment   Site (ms) Norm (mV) Norm %  cm m/s Norm ms %  C    Left Facial - Nasalis Motor   Mastoid 3.6 - 1.84 -      5.0  21.7    Right Facial - Nasalis Motor   Mastoid 3.1 - 1.91 -      6.1  21.8    Left Fibular (EDB) Motor   Ankle 3.9  < 6.0 4.3  > 2.5  Ankle-EDB 8   5.8  28.5    Bel Fib Head 13.3 - 3.5 - -18.6 Bel Fib Head-Ankle 39 41  > 38 6.4 -11.9 28.7    Pop Fossa 15.3 - 3.6 - 2.9 Pop Fossa-Bel Fib Head 8 40  > 38 6.6 2.5 28.9    Left Median (APB) Motor   Wrist 4.2  < 4.4 7.0  > 5.0  Wrist-APB 8   6.1  30.1    Elbow 8.7 - 6.3 - -10.0 Elbow-Wrist 24 53  > 48 6.4 -7.4 30.2    Left Ulnar (ADM) Motor   Wrist 3.9  < 3.5 9.5  > 5.0  Wrist-ADM 8   5.3  28.5    Bel Elbow 7.7 - 8.6 - -9.5 Bel Elbow-Wrist 23 61  > 48 5.9 -1.12 29.3    Abv Elbow 10.0 - 8.4 - -2.3 Abv Elbow-Bel Elbow 8 35  > 48 5.6 -5.6 30.2      Sensory Sites      Onset Lat Peak Lat Amp (O-P) Amp (P-P) Segment  Distance Velocity Temperature Comment   Site ms ms  V Norm  V  cm m/s Norm  C    Left Median Sensory   Wrist-Dig II 2.8 3.5 48  > 10 71 Wrist-Dig II 14 50  > 48 29.1    Left Sural Sensory   Calf-Lat Mall 2.9 3.6 9  > 5 11 Calf-Lat Mall 14 48  > 38 30.8    Left Ulnar Sensory   Wrist-Dig V 2.4 3.0 20  > 8 16 Wrist-Dig V 12.5 52  > 48 29.8      RNS     Trial # Label Amp 1 (mV)  O-P Amp 4 (mV)  O-P Amp % Dif Area 1 (mV ms) Area 4 (mV ms) Area % Dif Rep Rate Train Length Pause Time (min:sec) Comments   Left Abductor Digiti Minimi   Tr 1 Baseline 9.61 9.46 -1.5 27.62 26.89 -2.6 3.00 6 00:30    Tr 2 Post Exercise 10.99 10.94 -0.4 32.31 31.14 -3.6 3.00 6 01:00    Tr 3 1 min Post 9.96 9.81 -1.5 29.90 28.41 -5.0 3.00 6 01:00    Tr 4 2 min Post 9.79 9.94 1.5 29.03 28.88 -0.5 3.00 6 01:00    5 3 min Post       3.00 6 00:00    Left Nasalis   Tr 1 Baseline 1.68 1.67 -0.5 5.32 5.18 -2.6 3.00 6 00:30    Tr 2 Post Exercise 1.70 1.70 -0.0 5.07 4.98 -1.7 3.00 6 01:00    3 1 min Post       3.00 6 01:00    Tr 4 2 min Post 1.74 1.79 3.3 5.46 5.48 0.2 3.00 6 01:00    Tr 5 3 min Post 1.76 1.75 -0.6 5.59 5.41 -3.3 3.00 6 00:00        Electromyography     Side Muscle Ins Act Fibs/PSW Fasc HF Amp Dur Poly Recrt Int Pat   Left Frontalis Nml None Nml 0 Nml Nml 0 Nml Nml   Left Masseter Nml None Nml 0 Nml Nml 0 Nml Nml   Left Genioglossus Nml None Nml 0 Nml 1+ 0 ModRed Nml   Right Genioglossus Nml None Nml 0 Nml Nml 0 ModRed Nml   Left ADM Incr 1+ Nml 0 Nml Nml 0 Nml Nml   Left Biceps Nml None Nml 0 Nml Nml 0 Nml Nml   Left Triceps Nml None Nml 0 Nml Nml 0 Nml Nml   Left Deltoid Nml None Nml 0 Nml Nml 0 Nml Nml   Left C7 Parasp Nml None Nml 0            NCS Waveforms:    Motor                  Sensory

## 2022-07-27 DIAGNOSIS — R47.1 ATAXIC DYSARTHRIA: Primary | ICD-10-CM

## 2022-07-28 ENCOUNTER — TELEPHONE (OUTPATIENT)
Dept: NEUROLOGY | Facility: CLINIC | Age: 52
End: 2022-07-28

## 2022-07-28 NOTE — TELEPHONE ENCOUNTER
[facility] Call Center    Phone Message    May a detailed message be left on voicemail: yes     Reason for Call: Other: Patient wife Fátima is requesting a call back to discuss pulmonary questions and another concerns from patient last visit with Dr. Mortensen, Please call Fátima at [phone] to advice.     Action Taken: Message routed to:  [facility] ([facility]    Travel Screening: Not Applicable

## 2022-07-28 NOTE — TELEPHONE ENCOUNTER
I returned call to pt wife. I left voicemail. I asked she call or send [employer] message when available to discuss her questions.     Vane BURKS

## 2022-07-28 NOTE — TELEPHONE ENCOUNTER
I spoke with the patient's wife today.  She expressed understanding of the switch to have Esteban seen within the ataxia clinic.    She also expressed symptoms not previously discussed.  Esteban has developed severe snoring, and breathing difficulties (only breathing loudly with his nose) in the last year.  He may breath for periods of time that is normal, but out of nowhere he may have a long inspiration and sigh.  If was breathing like this when they were dating, she wouldn't have been able to handle it.    FABIEN Mortensen D.O.  [facility] Neurology

## 2022-07-29 NOTE — TELEPHONE ENCOUNTER
RECORDS RECEIVED FROM: internal   REASON FOR VISIT: ataxia   Date of Appt: 8/5/22   NOTES (FOR ALL VISITS) STATUS DETAILS   OFFICE NOTE from referring provider Internal Dr Mortensen @ [facility] Neurology:  7/21/22   OFFICE NOTE from other specialist Care Everywhere Dr Faith Bonilla @ [place] [facility]:  7/18/22 4/29/22 4/13/22   MEDICATION LIST Internal    IMAGING  (FOR ALL VISITS)     EMG Internal/CE [facility]:  7/26/22    [place]:  4/27/22   LUMBAR PUNCTURE Care Everywhere [place]:  7/11/22   MRI (HEAD, NECK, SPINE) Received [facility]:  MRI Head 6/28/22  MRI Head 3/30/22

## 2022-07-31 ENCOUNTER — HEALTH MAINTENANCE LETTER (OUTPATIENT)
Age: 52
End: 2022-07-31

## 2022-08-04 NOTE — PROGRESS NOTES
Department of Neurology  Movement Disorders Division   Ataxia Clinic  New Patient Visit    Patient: Esteban Casillas   MRN: [id number]   : 1970   Date of Visit: 2022  PCP: Bernice Talavera   Referring provider: Tony Mortensen DO    CC:  Swallowing difficult    HPI:  Mr. Casillas is a 51 year old right-handed male who presents to movement disorder clinic for progressive speech & swallowing difficulty. He is accompanied by his wife, Lupe.    He reports that he first noticed symptoms in 2021 that his speech was significantly slurred, people were asking if he was drinking. In hindsight, it's possible that his speech was slower for a year longer. He was certainly more hoarse and coughing in 2021, ENT evaluated, didn't see a reason for hoarseness. Then evaluated by GI, noted esophageal stricture & reflux, started on PPI and coughed improved. Hoarseness was persistent. His symptoms have progressed over this time. Worse at night.     Swallowing water is more difficult, fine if he remembers to take sips. He does not notice problems with swallowing food, some problem with larger pills. He last had a swallow study at [place] in the end of April with some flash penetration with successive swallows, recommend single small bites and sips.    No symptoms noted with coordination or strength in his legs. Balance is intact with daily activities, maybe some difficulty with tandem gait in offices.    He did have a fall from a ladder in May of this year requiring ED visit and surgery. No other falls.     He is not currently working, previously worked in sales & customer services. He is interested in looking into disability. He has not noticed any cognitive/personality symptoms. Family has noticed more impulsivity and short-tempered, particularly if routine is deviated. He's made rare comments without a filter but not common. No inappropriate emotions.    He is working with a health therapist on Monday.      No family members with similar symptoms. His sister is adopted. No biological children. Lives with wife and teenage step-children at home.    Has high blood pressure, no syncopal episodes. No urinary or bowel symptoms.     Sleep okay at night, sometimes takes a bit to fall asleep. Sometimes snores a bit while lying flat on back. Wife has noticed some irregular breathing rate, no gasping, does talk in sleep and moves around in his sleep (not new). They deny any stridor. Side sleeper.     MEDICATIONS reviewed & pertinent for:  No pertinent medications.    ROS:  All others negative except as listed above.    Past Medical History:   Diagnosis Date     Hypertension     intermittent elevated BP readings for several years     Other nervous system complications 12/2021    facial weakness, dysarthria, dysphagia, difficulty with processing complex information, impaired judgment     Pain in joint, shoulder region         Past Surgical History:   Procedure Laterality Date     ORTHOPEDIC SURGERY  5/30/2022    prepatellar bursitis R knee, I&D          Current Outpatient Medications:      alum hydroxide-mag trisilicate (GAVISCON) 80-14.2 MG CHEW chewable tablet, Take 1-2 tablets by mouth 3 times daily as needed, Disp: , Rfl:      azelastine (ASTELIN) 0.1 % nasal spray, Spray 1 spray in nostril daily, Disp: , Rfl:      cetirizine (ZYRTEC) 10 MG tablet, Take 10 mg by mouth daily, Disp: , Rfl:      fluticasone (FLONASE) 50 MCG/ACT nasal spray, Spray 2 sprays in nostril 2 times daily, Disp: , Rfl:      NO ACTIVE MEDICATIONS, , Disp: , Rfl:      pantoprazole (PROTONIX) 40 MG EC tablet, Take 40 mg by mouth daily, Disp: , Rfl:      triamcinolone (KENALOG) 0.1 % cream, Apply a thin layer to affected area BID x 1-2 weeks during flares. Taper with improvement., Disp: 80 g, Rfl: 1     Allergies   Allergen Reactions     Mold Shortness Of Breath and Unknown     Sulfamethoxazole-Trimethoprim Rash        Family History   Problem Relation Age  of Onset     Breast Cancer Mother      Cancer Mother         breast cancer     Dementia Father         in 70's     Skin Cancer Maternal Grandfather       Father had dementia in 70's, no personality changes, more forgetting people.     Social History:  He  reports that he has never smoked. He has never used smokeless tobacco. He reports current alcohol use. He reports that he does not use drugs.     PHYSICAL EXAM:  BP (!) 159/95   Pulse 88   Wt 109.8 kg (242 lb)   SpO2 99%   BMI 29.46 kg/m       Gen: alert, active, attentive, appropriately groomed     NEURO:  MS: Alert and oriented to person, place, time, and situation.  Speech normal to comprehension.  Recent and remote memory intact.  Attention and concentration normal.  Fund of knowledge normal.    CN:  Pupils equal, round, and reactive to light. VFF. EOM normal range, horizontal nystagmus noted.  Hypometric saccades. Facial sensation intact. Face symmetric at rest and with activation. Intact to finger rub bilaterally. Palate rise b/l, uvula midline.  Moderate ataxic dysarthria. Shoulder shrug symmetric. Tongue protrudes midline. No fasciculation or atrophy noted.     Motor:  Normal bulk and tone throughout upper and lower extremities. No tremors noted. Strength is 5/5 throughout and symmetric.     Sensation:  Intact to LT, vibration, and temperature in all extremities.    Coordination:  FTN and HTN with dysmetria bilaterally.    Gait:  Wide based gait. Able to stand with feet together x 10 seconds. Unable to  tandem pose.  Romberg negative.    LABS:  TSH, thiamine, vitamin B12 & E, ceruloplasmin, copper, zinc, manganese, lyme antibody, DELMY, ESR, IgA, tTG antibody, hexosaminidase A antibody, movement disorder paraneoplastic panel within normal limits  CSF: glucose, cell count within normal limits. Total protein slightly elevated (38, normal 0-35)  CCP antibody positive (208.7, normal is < 20)    IMAGING/STUDIES:  MRI brain w/o cont 6/28/2022 -  personally reviewed, notable for vermal cerebellar degeneration  1. Negative MRI examination the brain. No evidence of acute infarction, intracranial hemorrhage, mass or cerebral demyelination.   2. No new findings compared to prior MRI of 03/30/2022.     ASSESSMENT:  Mr. Casillas is a 51 year old right-handed male who presents to movement disorder clinic for progressive speech & swallowing difficulty. Exam is notable for ataxic speech, gait and FTN/HTS. 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 He has limited family history, but no known ataxia. Today we discussed his diagnosis and provided him with educational materials for ataxia and community resources. I also provided him with a  and speech therapist referral.    PLAN:  - Continue to stay active  - Recommend alcohol cessation  - referrals: speech therapy, social work    RTC 6 months, in person    Patient and plan was examined & discussed with attending physician, Dr. Ruiz.     Hayley Kendrick MD  Movement Disorders Fellow

## 2022-08-05 ENCOUNTER — PRE VISIT (OUTPATIENT)
Dept: NEUROLOGY | Facility: CLINIC | Age: 52
End: 2022-08-05

## 2022-08-05 ENCOUNTER — OFFICE VISIT (OUTPATIENT)
Dept: NEUROLOGY | Facility: CLINIC | Age: 52
End: 2022-08-05
Payer: COMMERCIAL

## 2022-08-05 ENCOUNTER — OFFICE VISIT (OUTPATIENT)
Dept: NEUROLOGY | Facility: CLINIC | Age: 52
End: 2022-08-05
Attending: PSYCHIATRY & NEUROLOGY
Payer: COMMERCIAL

## 2022-08-05 VITALS
BODY MASS INDEX: 29.46 KG/M2 | WEIGHT: 242 LBS | OXYGEN SATURATION: 99 % | HEART RATE: 88 BPM | SYSTOLIC BLOOD PRESSURE: 159 MMHG | DIASTOLIC BLOOD PRESSURE: 95 MMHG

## 2022-08-05 DIAGNOSIS — R47.1 ATAXIC DYSARTHRIA: ICD-10-CM

## 2022-08-05 DIAGNOSIS — R47.1 DYSARTHRIA: Primary | ICD-10-CM

## 2022-08-05 DIAGNOSIS — G11.8 SPINOCEREBELLAR ATAXIA (H): Primary | ICD-10-CM

## 2022-08-05 PROCEDURE — 99215 OFFICE O/P EST HI 40 MIN: CPT | Mod: GC | Performed by: PSYCHIATRY & NEUROLOGY

## 2022-08-05 PROCEDURE — 96040 PR GENETIC COUNSELING, EACH 30 MIN: CPT | Performed by: GENETIC COUNSELOR, MS

## 2022-08-05 NOTE — PROGRESS NOTES
GENETIC COUNSELING-ataxia clinic  I met with Esteban Casillas and his wife, Lupe, for a 20 minute genetic consult in the ataxia clinic at [facility].  We met to review family history.    MEDICAL HISTORY-  Esteban has developed dysarthria in the past 1-2 years and the etiology is not known.  He has had extensive evaluation for possible diagnosis of ALS.  His wife notes that she thinks that he also has decreased movements of his face, difficulty comprehending her, and changes in his judgement.    FAMILY HISTORY-See scanned pedigree  Esteban has a very small biological family. He has no biological children or siblings. He only had one uncle.  His father, mother, uncle, and grandparents are all .  Besides dementia in his father, no significant family history of neurologic disease is reported.    GENETIC COUNSELING  We discussed the genetic and environmental basis of neurologic disease. I explained that in most cases, it results from complex and lifelong interaction of multiple genetic and environmental factors. In some rare cases, there may be a single gene cause.  In Esteban's case, it is difficult to exclude a possible genetic basis given his very small biological family . I explained that Esteban's young age of onset and unusual symptoms do suggest the possibility of a genetic basis for his symptoms- although it is difficult to know what tests would be best to pursue.  He will be seen today by Dr. Kendrick and Dr. Ruiz and I will meet with Esteban/Lupe again if we decide to offer genetic testing.    Fritz Berry MS, [facility]  Certified Genetic Counselo

## 2022-08-05 NOTE — LETTER
2022       RE: Esteban Casillas  [address] [place] [id number]     Dear Colleague,    Thank you for referring your patient, Esteban Casillas, to the [facility] NEUROLOGY CLINIC [place] at [facility]. Please see a copy of my visit note below.    Department of Neurology  Movement Disorders Division   Ataxia Clinic  New Patient Visit    Patient: Esteban Casillas   MRN: [id number]   : 1970   Date of Visit: 2022  PCP: Bernice Talavera   Referring provider: Tony Mortensen DO    CC:  Swallowing difficult    HPI:  Mr. Casillas is a 51 year old right-handed male who presents to movement disorder clinic for progressive speech & swallowing difficulty. He is accompanied by his wife, Lupe.    He reports that he first noticed symptoms in 2021 that his speech was significantly slurred, people were asking if he was drinking. In hindsight, it's possible that his speech was slower for a year longer. He was certainly more hoarse and coughing in 2021, ENT evaluated, didn't see a reason for hoarseness. Then evaluated by GI, noted esophageal stricture & reflux, started on PPI and coughed improved. Hoarseness was persistent. His symptoms have progressed over this time. Worse at night.     Swallowing water is more difficult, fine if he remembers to take sips. He does not notice problems with swallowing food, some problem with larger pills. He last had a swallow study at [place] in the end of April with some flash penetration with successive swallows, recommend single small bites and sips.    No symptoms noted with coordination or strength in his legs. Balance is intact with daily activities, maybe some difficulty with tandem gait in offices.    He did have a fall from a ladder in May of this year requiring ED visit and surgery. No other falls.     He is not currently working, previously worked in sales & customer services. He is  interested in looking into disability. He has not noticed any cognitive/personality symptoms. Family has noticed more impulsivity and short-tempered, particularly if routine is deviated. He's made rare comments without a filter but not common. No inappropriate emotions.    He is working with a health therapist on Monday.     No family members with similar symptoms. His sister is adopted. No biological children. Lives with wife and teenage step-children at home.    Has high blood pressure, no syncopal episodes. No urinary or bowel symptoms.     Sleep okay at night, sometimes takes a bit to fall asleep. Sometimes snores a bit while lying flat on back. Wife has noticed some irregular breathing rate, no gasping, does talk in sleep and moves around in his sleep (not new). They deny any stridor. Side sleeper.     MEDICATIONS reviewed & pertinent for:  No pertinent medications.    ROS:  All others negative except as listed above.    Past Medical History:   Diagnosis Date     Hypertension     intermittent elevated BP readings for several years     Other nervous system complications 12/2021    facial weakness, dysarthria, dysphagia, difficulty with processing complex information, impaired judgment     Pain in joint, shoulder region         Past Surgical History:   Procedure Laterality Date     ORTHOPEDIC SURGERY  5/30/2022    prepatellar bursitis R knee, I&D          Current Outpatient Medications:      alum hydroxide-mag trisilicate (GAVISCON) 80-14.2 MG CHEW chewable tablet, Take 1-2 tablets by mouth 3 times daily as needed, Disp: , Rfl:      azelastine (ASTELIN) 0.1 % nasal spray, Spray 1 spray in nostril daily, Disp: , Rfl:      cetirizine (ZYRTEC) 10 MG tablet, Take 10 mg by mouth daily, Disp: , Rfl:      fluticasone (FLONASE) 50 MCG/ACT nasal spray, Spray 2 sprays in nostril 2 times daily, Disp: , Rfl:      NO ACTIVE MEDICATIONS, , Disp: , Rfl:      pantoprazole (PROTONIX) 40 MG EC tablet, Take 40 mg by mouth daily,  Disp: , Rfl:      triamcinolone (KENALOG) 0.1 % cream, Apply a thin layer to affected area BID x 1-2 weeks during flares. Taper with improvement., Disp: 80 g, Rfl: 1     Allergies   Allergen Reactions     Mold Shortness Of Breath and Unknown     Sulfamethoxazole-Trimethoprim Rash        Family History   Problem Relation Age of Onset     Breast Cancer Mother      Cancer Mother         breast cancer     Dementia Father         in 70's     Skin Cancer Maternal Grandfather       Father had dementia in 70's, no personality changes, more forgetting people.     Social History:  He  reports that he has never smoked. He has never used smokeless tobacco. He reports current alcohol use. He reports that he does not use drugs.     PHYSICAL EXAM:  BP (!) 159/95   Pulse 88   Wt 109.8 kg (242 lb)   SpO2 99%   BMI 29.46 kg/m       Gen: alert, active, attentive, appropriately groomed     NEURO:  MS: Alert and oriented to person, place, time, and situation.  Speech normal to comprehension.  Recent and remote memory intact.  Attention and concentration normal.  Fund of knowledge normal.    CN:  Pupils equal, round, and reactive to light. VFF. EOM normal range, horizontal nystagmus noted.  Hypometric saccades. Facial sensation intact. Face symmetric at rest and with activation. Intact to finger rub bilaterally. Palate rise b/l, uvula midline.  Moderate ataxic dysarthria. Shoulder shrug symmetric. Tongue protrudes midline. No fasciculation or atrophy noted.     Motor:  Normal bulk and tone throughout upper and lower extremities. No tremors noted. Strength is 5/5 throughout and symmetric.     Sensation:  Intact to LT, vibration, and temperature in all extremities.    Coordination:  FTN and HTN with dysmetria bilaterally.    Gait:  Wide based gait. Able to stand with feet together x 10 seconds. Unable to  tandem pose.  Romberg negative.    LABS:  TSH, thiamine, vitamin B12 & E, ceruloplasmin, copper, zinc, manganese, lyme  antibody, DELMY, ESR, IgA, tTG antibody, hexosaminidase A antibody, movement disorder paraneoplastic panel within normal limits  CSF: glucose, cell count within normal limits. Total protein slightly elevated (38, normal 0-35)  CCP antibody positive (208.7, normal is < 20)    IMAGING/STUDIES:  MRI brain w/o cont 6/28/2022 - personally reviewed, notable for vermal cerebellar degeneration  1. Negative MRI examination the brain. No evidence of acute infarction, intracranial hemorrhage, mass or cerebral demyelination.   2. No new findings compared to prior MRI of 03/30/2022.     ASSESSMENT:  Mr. Casillas is a 51 year old right-handed male who presents to movement disorder clinic for progressive speech & swallowing difficulty. Exam is notable for ataxic speech, gait and FTN/HTS. 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 He has limited family history, but no known ataxia. Today we discussed his diagnosis and provided him with educational materials for ataxia and community resources. I also provided him with a  and speech therapist referral.    PLAN:  - Continue to stay active  - Recommend alcohol cessation  - referrals: speech therapy, social work    RTC 6 months, in person    Patient and plan was examined & discussed with attending physician, Dr. Ruiz.     Hayley Kendrick MD  Movement Disorders Fellow    I have personally interviewed and examined Mr. Esteban Casillas and agree with diagnosis and management. The total time spent with the patient was 60 minutes, over 50% of the time spent in counseling and coordinating care.      Sincerely,    Mayur Ruiz MD

## 2022-08-05 NOTE — LETTER
2022       RE: Esteban Casillas  [address] [facility] [address]     Dear Colleague,    Thank you for referring your patient, Esteban Casillas, to the [facility] NEUROLOGY CLINIC [place] at [facility]. Please see a copy of my visit note below.    GENETIC COUNSELING-ataxia clinic  I met with Esteban Casillas and his wife, Lupe, for a 20 minute genetic consult in the ataxia clinic at [facility].  We met to review family history.    MEDICAL HISTORY-  Esteban has developed dysarthria in the past 1-2 years and the etiology is not known.  He has had extensive evaluation for possible diagnosis of ALS.  His wife notes that she thinks that he also has decreased movements of his face, difficulty comprehending her, and changes in his judgement.    FAMILY HISTORY-See scanned pedigree  Esteban has a very small biological family. He has no biological children or siblings. He only had one uncle.  His father, mother, uncle, and grandparents are all .  Besides dementia in his father, no significant family history of neurologic disease is reported.    GENETIC COUNSELING  We discussed the genetic and environmental basis of neurologic disease. I explained that in most cases, it results from complex and lifelong interaction of multiple genetic and environmental factors. In some rare cases, there may be a single gene cause.  In Esetban's case, it is difficult to exclude a possible genetic basis given his very small biological family . I explained that Esteban's young age of onset and unusual symptoms do suggest the possibility of a genetic basis for his symptoms- although it is difficult to know what tests would be best to pursue.  He will be seen today by Dr. Kendrick and Dr. Ruiz and I will meet with Esteban/Lupe again if we decide to offer genetic testing.      Fritz Berry MS, [facility]  Certified Genetic Counselor

## 2022-08-05 NOTE — PATIENT INSTRUCTIONS
Keep working with speech/swallow therapy for ataxic speech. Pay attention to your swallowing, eat slowly, take small bites and sips.     We will work with you on getting disability. I have given you a referral for a  to look into disability as well.     I recommend that you stop drinking alcohol as this can exacerbate your symptoms.

## 2022-08-12 ENCOUNTER — PATIENT OUTREACH (OUTPATIENT)
Dept: CARE COORDINATION | Facility: CLINIC | Age: 52
End: 2022-08-12

## 2022-08-12 NOTE — PROGRESS NOTES
I have personally interviewed and examined . Esteban Casillas and agree with diagnosis and management. The total time spent with the patient was 60 minutes, over 50% of the time spent in counseling and coordinating care.

## 2022-08-12 NOTE — PROGRESS NOTES
Social Work Intervention  [facility]    Data/Intervention:    Patient Name:  Esteban Casillas  /Age:  1970 (51 year old)    Visit Type: telephone  Referral Source: Dr Hayley Kendrick  Reason for Referral:  disability    Collaborated With:    -Esteban and his wife Fátima    Psychosocial Information/Concerns:  Phone meeting with Pt and his wife Fátima. They live in their own home in [place]. Fátima is a nurse at [employer]. Esteban was fired from his job in April, likely from the impact of his Ataxia. 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 Encouraged him to use the National Ataxia [employer] website for information, including support groups which he may look into. He is planning to see a therapist next week.  They want to discuss how to apply for Social Security Disability.    Intervention/Education/Resources Provided:  Reviewed how to apply for SSDI and the process. It will take a minimum of 5 months to receive his first check. His dx is on the Compassionate Allowances list so that will expedite approval. The children are Fátima's from a previous relationship so will not be eligible for payments.  Encouraged them to consider also family therapy as it can open up communication and help with coping for all.  Provided support and discussed coping.    Assessment/Plan:  They will proceed with the SSDI application and are aware they can contact me anytime as needed.     Provided patient/family with contact information and availability.    MARLENI Reza, [facility]    [facility]  [phone]

## 2022-08-16 ENCOUNTER — HOSPITAL ENCOUNTER (OUTPATIENT)
Dept: SPEECH THERAPY | Facility: CLINIC | Age: 52
Discharge: HOME | End: 2022-08-16
Attending: PSYCHIATRY & NEUROLOGY
Payer: COMMERCIAL

## 2022-08-16 PROCEDURE — 92610 EVALUATE SWALLOWING FUNCTION: CPT | Mod: GN | Performed by: SPEECH-LANGUAGE PATHOLOGIST

## 2022-08-16 PROCEDURE — 92526 ORAL FUNCTION THERAPY: CPT | Mod: GN | Performed by: SPEECH-LANGUAGE PATHOLOGIST

## 2022-08-17 NOTE — PROGRESS NOTES
Speech-Language Pathology Department   EVALUATION  [facility]  CLINICAL SWALLOW EVALUATION    08/16/22 1400   General Information   Type Of Visit Initial   Start Of Care Date 08/16/22   Referring Physician Dr. 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 male who was referred by the movement disorder clinic for progressive speech and swallowing difficulties related to his diagnosis of Spinocerebellar ataxia. Pt reports symptoms were first noticed in 12/2021 with a significant slurring in his speech, though he also reports noting possible changes in rate of speech for a year longer. Increasing vocal hoarseness and coughing noted in fall, 2021. Pt completed ENT and GI evaluations which identified esophageal stricture and reflux, Pt was started on PPI which improved his cough but not hoarseness, which is persistent. Pt completed a VFSS at [place] 5/2022 with results demonstrating flash penetration on successive swallow of thin liquid, Pt reports focusing on single sips and using ice water as a cue (when the ice touches his lips) to slow his swallowing. Pt received x3 sessions of home care speech therapy, he reports learning the speech strategy of reduced rate to improve speech clarity which is helpful when on the phone. Pt notes increased fatigue in the evenings, he utilizes a phone zara called  Text to Speech  to assist in communication as needed, and reports crushing medications if needed at night (takes pain reliever for HA s as needed).   Respiratory Status Room air   Prior Level Of  Function Communications;Swallowing   Prior Level Of Function Comment Gradual decline noted over past 1-2 years.   Patient Role/employment History Disabled;Retired  (Pt previously worked in sales.)   Living Environment House/[facility]   General Observations Pt alert and engaged.   Patient/family Goals Improve and maintain speech and swallowing skills.   General Information Comments Pt engages in yoga and weight training at home. Enjoys work as a  through community ed programming (Pt notes use of an electronic whistle since start of COVID-19 pandemic which is very beneficial to him). Pt reports practicing daily his calls aloud (in, out, on the line) to support his communication needs when at [employer].   Pain Assessment   Pain Reported No   Fall Risk Screen   Fall screen completed by SLP   Have you fallen 2 or more times in the past year? No   Have you fallen and had an injury in the past year? Yes   Is patient a fall risk? Yes   Fall screen comments Pt d/c'ed from PT 6/23/22, will hold on referral at this time.   Abuse Screen (yes response referral indicated)   Feels Unsafe at Home or Work/School no   Feels Threatened by Someone no   Does Anyone Try to Keep You From Having Contact with Others or Doing Things Outside Your Home? no   Physical Signs of Abuse Present no   Patient needs abuse support services and resources No   Clinical Swallow Evaluation   Oral Musculature generally intact   Structural Abnormalities none present   Dentition present and adequate   Mucosal Quality good   Mandibular Strength and Mobility intact   Oral Labial Strength and Mobility impaired pursing;impaired coordination   Lingual Strength and Mobility impaired protrusion;impaired anterior elevation;impaired coordination   Buccal Strength and Mobility intact   Laryngeal Function Swallow;Voicing initiated   Oral Musculature Comments Impaired lingual and labial ROM, strength, and coordination of movements.   Additional  Documentation Yes   Additional evaluation(s) completed today Yes   Rationale for completing additional evaluation Based on Pt report of symptoms, Pt medical history, SLP clinical judgment, and MD orders.   Clinical Swallow Eval: Thin Liquid Texture Trial   Mode of Presentation, Thin Liquids cup;self-fed   Volume of Liquid or Food Presented 1 1sp  (single and successive (with short breaks) from open cup and water bottle.)   Oral Phase of Swallow WFL   Pharyngeal Phase of Swallow intact   Successful Strategies Trialed During Procedure chin tuck  (Trialed chin tuck as a strategy to be utilized PRN. May use with medication at night if needed.)   Diagnostic Statement No overt s/sx of aspiration nor penetration. Pt with knowledge base about aspiration, results of personal VFSS and recommendations for single sips.   Swallow Compensations   Swallow Compensations Chin tuck;Pacing;Reduce amounts   Results No difficulties noted   Educational Assessment   Barriers to Learning No barriers   Preferred Learning Style Listening;Reading;Demonstration;Pictures/video   General Therapy Interventions   Planned Therapy Interventions Dysphagia Treatment   Dysphagia treatment Oropharyngeal exercise training;Instruction of safe swallow strategies;Compensatory strategies for swallowing   Intervention Comments While Pt did not demonstrate overt s/sx of aspiration nor penetration during this session he is at increased risk d/t his medical diagosis of spinocerebellar ataxia and will benefit from skilled intervention.   Swallow Eval: Clinical Impressions   Skilled Criteria for Therapy Intervention Skilled criteria met.  Treatment indicated.   Treatment Diagnosis Mild oropharyngeal dysphagia   Diet texture recommendations Regular diet;Easy to Chew diet (level 7)   Recommended Feeding/Eating Techniques alternate between small bites and sips of food/liquid;small sips/bites   Rehab Potential fair, will monitor progress closely   Predicted Duration  of Therapy Intervention (days/wks) 2x/week for 8 weeks   Anticipated Discharge Disposition home   Risks and Benefits of Treatment have been explained. Yes   Patient, family and/or staff in agreement with Plan of Care Yes   Clinical Impression Comments Pt presents with mild oropharyngeal dysphagia on evaluation today characterized by impairments in strength, ROM and coordination of movement of his tongue, lips, and jaw. Pt previously completed a VFSS with results indicating flash penetration on successive swallow of thin liquid. No overt s/sx of aspiration nor penetration were observed during this evaluation. However, Pt is at increased risk for aspiration based on his medical diagnosis of Spinocerebellar ataxia. RECOMMEND: 1. Regular diet with thin liquids. 2. Use of the following safe swallow strategies; single sips only, head in neutral or chin down position (no reclining), alternating bites/sips during meals, small bites/sips, and adding moisture to dry foods as necessary or able. 3. Oropharyngeal exercises to improve strength and safety in swallowing.   Swallow Goals   SLP Swallow Goals 1;2   Swallow Goal 1   Goal Identifier LTG 1-Itotasrxp-Ojovsbvod   Goal Description Patient will complete recommended oralpharyngeal exercises 5-8x each provided min to mod cues in order to improve swallow function for least restrictive diet without overt s/x of penetration/aspiration across 1 week of meals, per patient and family report.   Target Date 11/15/22   Swallow Goal 2   Goal Identifier LTG 5-Reluadxqc-Rwcjqzlqsr   Goal Description Patient will demonstrate use of 3 safe swallow strategies independently to tolerate the least restrictive diet without overt s/x of penetration/aspiration across 1 week of meals, per patient and family report.   Target Date 11/15/22   Total Session Time   SLP Eval: oral/pharyngeal swallow function, clinical minutes (98051) 30   Total Evaluation Time 30     Thank you for referring Esteban  Vinny to outpatient therapy at [facility]. Please call Bobbi Lambert MA, SLP-CCC at [phone] email [email or web address] with any questions or concerns.      Bobbi Lambert M.A., SLP-CCC  Speech-Language Pathologist

## 2022-09-02 ENCOUNTER — HOSPITAL ENCOUNTER (OUTPATIENT)
Dept: SPEECH THERAPY | Facility: CLINIC | Age: 52
Discharge: HOME | End: 2022-09-02
Payer: COMMERCIAL

## 2022-09-02 PROCEDURE — 92526 ORAL FUNCTION THERAPY: CPT | Mod: GN | Performed by: SPEECH-LANGUAGE PATHOLOGIST

## 2022-09-06 ENCOUNTER — HOSPITAL ENCOUNTER (OUTPATIENT)
Dept: SPEECH THERAPY | Facility: CLINIC | Age: 52
Discharge: HOME | End: 2022-09-06
Payer: COMMERCIAL

## 2022-09-06 PROCEDURE — 92507 TX SP LANG VOICE COMM INDIV: CPT | Mod: GN | Performed by: SPEECH-LANGUAGE PATHOLOGIST

## 2022-09-06 PROCEDURE — 92526 ORAL FUNCTION THERAPY: CPT | Mod: GN | Performed by: SPEECH-LANGUAGE PATHOLOGIST

## 2022-09-09 ENCOUNTER — HOSPITAL ENCOUNTER (OUTPATIENT)
Dept: SPEECH THERAPY | Facility: CLINIC | Age: 52
Discharge: HOME | End: 2022-09-09
Payer: COMMERCIAL

## 2022-09-09 PROCEDURE — 92507 TX SP LANG VOICE COMM INDIV: CPT | Mod: GN | Performed by: SPEECH-LANGUAGE PATHOLOGIST

## 2022-09-13 ENCOUNTER — HOSPITAL ENCOUNTER (OUTPATIENT)
Dept: SPEECH THERAPY | Facility: CLINIC | Age: 52
Discharge: HOME | End: 2022-09-13
Payer: COMMERCIAL

## 2022-09-13 PROCEDURE — 92507 TX SP LANG VOICE COMM INDIV: CPT | Mod: GN | Performed by: SPEECH-LANGUAGE PATHOLOGIST

## 2022-09-16 ENCOUNTER — HOSPITAL ENCOUNTER (OUTPATIENT)
Dept: SPEECH THERAPY | Facility: CLINIC | Age: 52
Discharge: HOME | End: 2022-09-16
Payer: COMMERCIAL

## 2022-09-16 PROCEDURE — 92526 ORAL FUNCTION THERAPY: CPT | Mod: GN | Performed by: SPEECH-LANGUAGE PATHOLOGIST

## 2022-09-16 PROCEDURE — 92507 TX SP LANG VOICE COMM INDIV: CPT | Mod: GN | Performed by: SPEECH-LANGUAGE PATHOLOGIST

## 2022-09-20 ENCOUNTER — HOSPITAL ENCOUNTER (OUTPATIENT)
Dept: SPEECH THERAPY | Facility: CLINIC | Age: 52
Discharge: HOME | End: 2022-09-20
Payer: COMMERCIAL

## 2022-09-20 PROCEDURE — 92507 TX SP LANG VOICE COMM INDIV: CPT | Mod: GN | Performed by: SPEECH-LANGUAGE PATHOLOGIST

## 2022-09-30 ENCOUNTER — HOSPITAL ENCOUNTER (OUTPATIENT)
Dept: SPEECH THERAPY | Facility: CLINIC | Age: 52
Discharge: HOME | End: 2022-09-30
Payer: COMMERCIAL

## 2022-09-30 PROCEDURE — 92507 TX SP LANG VOICE COMM INDIV: CPT | Mod: GN | Performed by: SPEECH-LANGUAGE PATHOLOGIST

## 2022-10-11 ENCOUNTER — HOSPITAL ENCOUNTER (OUTPATIENT)
Dept: SPEECH THERAPY | Facility: CLINIC | Age: 52
Discharge: HOME | End: 2022-10-11
Payer: COMMERCIAL

## 2022-10-11 PROCEDURE — 92526 ORAL FUNCTION THERAPY: CPT | Mod: GN | Performed by: SPEECH-LANGUAGE PATHOLOGIST

## 2022-10-11 PROCEDURE — 92507 TX SP LANG VOICE COMM INDIV: CPT | Mod: GN | Performed by: SPEECH-LANGUAGE PATHOLOGIST

## 2022-10-14 ENCOUNTER — HOSPITAL ENCOUNTER (OUTPATIENT)
Dept: SPEECH THERAPY | Facility: CLINIC | Age: 52
Discharge: HOME | End: 2022-10-14
Payer: COMMERCIAL

## 2022-10-14 PROCEDURE — 92526 ORAL FUNCTION THERAPY: CPT | Mod: GN | Performed by: SPEECH-LANGUAGE PATHOLOGIST

## 2022-10-14 PROCEDURE — 92507 TX SP LANG VOICE COMM INDIV: CPT | Mod: GN | Performed by: SPEECH-LANGUAGE PATHOLOGIST

## 2022-10-15 ENCOUNTER — HEALTH MAINTENANCE LETTER (OUTPATIENT)
Age: 52
End: 2022-10-15

## 2022-10-18 ENCOUNTER — HOSPITAL ENCOUNTER (OUTPATIENT)
Dept: SPEECH THERAPY | Facility: CLINIC | Age: 52
Discharge: HOME | End: 2022-10-18
Payer: COMMERCIAL

## 2022-10-18 PROCEDURE — 92507 TX SP LANG VOICE COMM INDIV: CPT | Mod: GN | Performed by: SPEECH-LANGUAGE PATHOLOGIST

## 2022-10-18 PROCEDURE — 92526 ORAL FUNCTION THERAPY: CPT | Mod: GN | Performed by: SPEECH-LANGUAGE PATHOLOGIST

## 2022-10-21 ENCOUNTER — HOSPITAL ENCOUNTER (OUTPATIENT)
Dept: SPEECH THERAPY | Facility: CLINIC | Age: 52
Discharge: HOME | End: 2022-10-21
Payer: COMMERCIAL

## 2022-10-21 PROCEDURE — 92507 TX SP LANG VOICE COMM INDIV: CPT | Mod: GN | Performed by: SPEECH-LANGUAGE PATHOLOGIST

## 2022-10-25 ENCOUNTER — HOSPITAL ENCOUNTER (OUTPATIENT)
Dept: SPEECH THERAPY | Facility: CLINIC | Age: 52
Discharge: HOME | End: 2022-10-25
Payer: COMMERCIAL

## 2022-10-25 PROCEDURE — 92507 TX SP LANG VOICE COMM INDIV: CPT | Mod: GN | Performed by: SPEECH-LANGUAGE PATHOLOGIST

## 2022-10-28 ENCOUNTER — HOSPITAL ENCOUNTER (OUTPATIENT)
Dept: SPEECH THERAPY | Facility: CLINIC | Age: 52
Discharge: HOME | End: 2022-10-28
Payer: COMMERCIAL

## 2022-10-28 PROCEDURE — 92507 TX SP LANG VOICE COMM INDIV: CPT | Mod: GN | Performed by: SPEECH-LANGUAGE PATHOLOGIST

## 2022-11-01 ENCOUNTER — HOSPITAL ENCOUNTER (OUTPATIENT)
Dept: SPEECH THERAPY | Facility: CLINIC | Age: 52
Discharge: HOME | End: 2022-11-01
Payer: COMMERCIAL

## 2022-11-01 PROCEDURE — 92507 TX SP LANG VOICE COMM INDIV: CPT | Mod: GN | Performed by: SPEECH-LANGUAGE PATHOLOGIST

## 2022-11-02 ENCOUNTER — HOSPITAL ENCOUNTER (OUTPATIENT)
Dept: SPEECH THERAPY | Facility: CLINIC | Age: 52
Discharge: HOME | End: 2022-11-02
Payer: COMMERCIAL

## 2022-11-02 PROCEDURE — 92507 TX SP LANG VOICE COMM INDIV: CPT | Mod: GN | Performed by: SPEECH-LANGUAGE PATHOLOGIST

## 2022-11-04 ENCOUNTER — HOSPITAL ENCOUNTER (OUTPATIENT)
Dept: SPEECH THERAPY | Facility: CLINIC | Age: 52
Discharge: HOME | End: 2022-11-04
Payer: COMMERCIAL

## 2022-11-04 PROCEDURE — 92507 TX SP LANG VOICE COMM INDIV: CPT | Mod: GN | Performed by: SPEECH-LANGUAGE PATHOLOGIST

## 2022-11-08 ENCOUNTER — HOSPITAL ENCOUNTER (OUTPATIENT)
Dept: SPEECH THERAPY | Facility: CLINIC | Age: 52
Discharge: HOME | End: 2022-11-08
Payer: COMMERCIAL

## 2022-11-08 PROCEDURE — 92507 TX SP LANG VOICE COMM INDIV: CPT | Mod: GN | Performed by: SPEECH-LANGUAGE PATHOLOGIST

## 2022-11-09 ENCOUNTER — HOSPITAL ENCOUNTER (OUTPATIENT)
Dept: SPEECH THERAPY | Facility: CLINIC | Age: 52
Discharge: HOME | End: 2022-11-09
Payer: COMMERCIAL

## 2022-11-09 PROCEDURE — 92507 TX SP LANG VOICE COMM INDIV: CPT | Mod: GN | Performed by: SPEECH-LANGUAGE PATHOLOGIST

## 2022-11-15 ENCOUNTER — HOSPITAL ENCOUNTER (OUTPATIENT)
Dept: SPEECH THERAPY | Facility: CLINIC | Age: 52
Discharge: HOME | End: 2022-11-15
Payer: COMMERCIAL

## 2022-11-15 PROCEDURE — 92507 TX SP LANG VOICE COMM INDIV: CPT | Mod: GN | Performed by: SPEECH-LANGUAGE PATHOLOGIST

## 2022-11-15 NOTE — PROGRESS NOTES
[facility]    Outpatient Speech Language Pathology Progress Note  Patient: Esteban Casillas  : 1970    Beginning/End Dates of Reporting Period:  22 to 11/15/22    Referring Provider: Dr. TAMMY Kendrick MD     Therapy Diagnosis: Mild oropharyngeal dysphagia, moderate to severe dysarthria     Client Self Report: Pt is a 51 y.o. male with a medical dx of spinocerebellar ataxia. Pt completed an OP Clinical Swallow Evaluation on 22, he completed further in session dysarthria testing on  demonstrating 40% for words and 70% for sentences with a level 5/6 effort reported. Please see the EMR for further information.          Outcome Measures (most recent score):  See below:         Goals:    Goal Identifier LTG 0-Xnnfhynxu-Kzfwefrbd   Goal Description Patient will complete recommended oralpharyngeal exercises 5-8x each provided min to mod cues in order to improve swallow function for least restrictive diet without overt s/x of penetration/aspiration across 1 week of meals, per patient and family report.   Target Date 11/15/22   Date Met   11/15/22   Progress (detail required for progress note):  Goal met, Pt independent in daily completion of trained exercises.      Goal Identifier LTG 7-Tdtqrqcqs-Hylwzbuxtn   Goal Description Patient will demonstrate use of 3 safe swallow strategies independently to tolerate the least restrictive diet without overt s/x of penetration/aspiration across 1 week of meals, per patient and family report.   Target Date 11/15/22   Date Met   11/15/22   Progress (detail required for progress note):  Goal met, Pt consistently using strategies, but does benefit from verbal/visual reminders at times for the chin tuck.        Goal Identifier LTG 2-Dysphagia-NEW GOAL   Goal Description Patient will consistently utilize the chin tuck and reduced rate of intake strategies to reduce  frequency of overt s/sx of aspiration/penetration to <3x/day per Pt and family report.    Target Date 2/13/23   Date Met     Progress (detail required for progress note):      Goal Identifier LTG 3-Dysarthria/Motor speech   Goal Description Pt will demonstrate 70% at the sentence and extended reading levels of speech given  min to mod cues for improved verbal communication with family and friends.   Target Date 12/05/22   Date Met   11/15/22   Progress (detail required for progress note):  Goal met for word and sentence levels for 1-2 syllable words, Pt demonstrating variable accuracy for 3+ syllable words. See new goal below.      Goal Identifier LTG 4-OME   Goal Description Pt will complete recommended oral motor exercises targeting lip, jaw, cheek and tongue ROM and coordination of movement provided 0-min cues to improve movement for articulation of clear speech.   Target Date 12/05/22   Date Met   11/1/22   Progress (detail required for progress note):  Goal met, Pt demonstrating independence in completion of exercises.        Goal Identifier LTG 4-Exercises/Motor Speech NEW GOAL    Goal Description Pt will complete recommended oral motor exercises and SOVT exercises to improve articulatory movement and pitch consistency in order to demonstrate an average of 80% intelligibility for 3+ syllable words in isolation, sentences, and conversation.    Target Date 2/13/23   Date Met     Progress (detail required for progress note):           Plan:  Changes to therapy plan of care: See new goals outlined above. Recommend ongoing skilled intervention 3x/week for an additional 90 days.     Discharge:  No    Thank you for referring [facility]eb[facility]illas [facility]. Please call Bobbi Lambert MA, SLP-CCC at [phone] email [email or web address] with any questions or concerns.      Bobbi Lambert M.A., SLP-CCC  Speech-Language Pathologist

## 2022-11-16 ENCOUNTER — HOSPITAL ENCOUNTER (OUTPATIENT)
Dept: SPEECH THERAPY | Facility: CLINIC | Age: 52
Discharge: HOME | End: 2022-11-16
Payer: COMMERCIAL

## 2022-11-16 PROCEDURE — 92507 TX SP LANG VOICE COMM INDIV: CPT | Mod: GN | Performed by: SPEECH-LANGUAGE PATHOLOGIST

## 2022-11-17 ENCOUNTER — DOCUMENTATION ONLY (OUTPATIENT)
Dept: OTHER | Facility: CLINIC | Age: 52
End: 2022-11-17

## 2022-11-25 NOTE — PROGRESS NOTES
[facility] Neurology Follow Up Visit    Esteban Casillas MRN# [id number]   Age: 51 year old YOB: 1970     Brief history of symptoms: The patient was initially seen in neurologic consultation on 7/21/2022 for evaluation of dysarthria. Please see the comprehensive neurologic consultation notes from those dates in the Epic records for details.     Initial impression was for bulbar related weakness, possibly from a motor neuron disease.  EMG 7/26/2022 did not show clear evidence for motor neuron disease.  He was then seen with Dr. Sara davein the ataxia clinic 8/5/2022 with concern for a progressive hereditary ataxia syndrome.  Genetic testing was offered for further defining this likely condition.    Today, the patient confirms he is attending speech therapy 3 days a week.  There has not been any genetic testing done at this time.  There isn't necessarily new neurological symptoms.  He is coughing after meals, he is still snoring, thin liquids are still difficult, speech is becoming more difficult to understand at the end of the day.  There isn't much of a concern for cognitive deficits beyond communication difficulties with speech.            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  He is understanding the importance for proper swallowing techniques and avoidance of aspiration.  He will keep his ataxia team updated should new issues arise and require further testing.  He will consider genetic testing to better determine any idea of progression which may occur.    Plan:  Follow up in Neurology ataxia clinic as scheduled .    FABIEN Mortensen D.O.   of Neurology    Total time today (30 min) in this patient  encounter was spent on pre-charting, counseling and/or coordination of care.

## 2022-11-25 NOTE — LETTER
11/25/2022       RE: Esteban Casillas  [address] [place] [id number]     Dear Colleague,    Thank you for referring your patient, Esteban Casillas, to the [facility] NEUROLOG[facility]. Please see a copy of my visit note below.    [facility] Neurology Follow Up Visit    Esteban Casillas MRN# [id number]   Age: 51 year old YOB: 1970     Brief history of symptoms: The patient was initially seen in neurologic consultation on 7/21/2022 for evaluation of dysarthria. Please see the comprehensive neurologic consultation notes from those dates in the Epic records for details.     Initial impression was for bulbar related weakness, possibly from a motor neuron disease.  EMG 7/26/2022 did not show clear evidence for motor neuron disease.  He was then seen with Dr. Sara davein the ataxia clinic 8/5/2022 with concern for a progressive hereditary ataxia syndrome.  Genetic testing was offered for further defining this likely condition.    Today, the patient confirms he is attending speech therapy 3 days a week.  There has not been any genetic testing done at this time.  There isn't necessarily new neurological symptoms.  He is coughing after meals, he is still snoring, thin liquids are still difficult, speech is becoming more difficult to understand at the end of the day.  There isn't much of a concern for cognitive deficits beyond communication difficulties with speech.            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  He is understanding the importance for proper swallowing techniques and avoidance of aspiration.  He will keep his ataxia team  updated should new issues arise and require further testing.  He will consider genetic testing to better determine any idea of progression which may occur.    Plan:  Follow up in Neurology ataxia clinic as scheduled .    FABIEN Mortensen D.O.   of Neurology    Total time today (30 min) in this patient encounter was spent on pre-charting, counseling and/or coordination of care.

## 2022-11-25 NOTE — PROGRESS NOTES
Esteban is a 51 year old who is being evaluated via a billable video visit.      How would you like to obtain your AVS? [email or web address]  If the video visit is dropped, the invitation should be resent by: Send to e-mail at: [email or web address]  Will anyone else be joining your video visit? No        Video-Visit Details    Video Start Time: 110    Type of service:  Video Visit    Video End Time:1:34 PM    Originating Location (pt. Location): Home        Distant Location (provider location):  On-site    Platform used for Video Visit: Maame

## 2022-11-29 NOTE — PROGRESS NOTES
Esteban Casillas is a 51 year old male who is being seen via a billable video visit.      Patient has given verbal consent for Video visit? Yes    Video Start Time: 10:30    Telehealth Visit Details    Type of Service:  Telehealth    Video End Time (time video stopped): 11:10    Originating Location (pt. location): Home    Additional Participants in Telehealth Visit: none    Distant Location (provider location):   On-site    Mode of Communication (Audio Visual or Audio Only):  audio and video. [facility].     Bobbi Lambert, SLP  November 29, 2022

## 2022-12-06 NOTE — TELEPHONE ENCOUNTER
GENETIC COUNSELING-ataxia clinic  I previously met with Esteban Casillas to review family history and genetic testing options. He has spinocerebellar ataxia of uncertain etiology.  He has evidence of ataxia on exam that is consistent with a hereditary form of ataxia. He has a very limited family structure which makes it difficult to exclude a hereditary basis for his symptoms. In addition, three of the most common genetic forms of ataxia (Friedreich ataxia, FXTAS and CANVAS) are either autosomal recessive or X-linked and would not typically be expected to show a significant family history.      At the time of our initial meeting, Esteban and his significant other, Fátima, had deferred a decision on genetic testing. They have since met with Dr. Mortensen again and they would like to proceed with genetic testing as it may help to clarify prognostic expectations and could alter medical management decisions as some forms of hereditary ataxia may be accompnied by additional neurologic and non-neurologic manifestations.    We discussed potential testing options including testing for the common repeat- mediated ataxias and testing for sequence-based ataxias.  I explained that a rational first step would be to test for the common repeat mediated ataxias (e.g. Friedriech, FXTAS, CANVAS, and the common CAG repeat-mediated ataxias) as these are the most common genetic forms of ataxia and they cannot be reliably detected by sequence-based methodologies.    We believe that this is medically necessary as Mr. Casillas has a clear young onset neurodegenerative condition that fundamentally impacts his ability to work, communicate, and live independently.  We are requesting a targeted test for the common repeat mediated forms of ataxia. If this testing is negative, we could discuss additional testing options.    Esteban and Fátima consented to this plan and we will attempt to obtain prior authorization.    Fritz Berry MS, [facility]  Certified  Genetic Counselor

## 2022-12-21 NOTE — TELEPHONE ENCOUNTER
GENETIC COUNSELING-Ataxia clinic  We received approval for ataxia repeat panel. Order placed to have this done at [facility]. Patient will schedule blood draw.

## 2023-01-01 ENCOUNTER — APPOINTMENT (OUTPATIENT)
Dept: CT IMAGING | Facility: CLINIC | Age: 53
End: 2023-01-01
Attending: EMERGENCY MEDICINE
Payer: COMMERCIAL

## 2023-01-01 ENCOUNTER — THERAPY VISIT (OUTPATIENT)
Dept: SPEECH THERAPY | Facility: CLINIC | Age: 53
End: 2023-01-01
Payer: COMMERCIAL

## 2023-01-01 ENCOUNTER — TELEPHONE (OUTPATIENT)
Dept: PULMONOLOGY | Facility: CLINIC | Age: 53
End: 2023-01-01

## 2023-01-01 ENCOUNTER — TELEPHONE (OUTPATIENT)
Dept: NEUROLOGY | Facility: CLINIC | Age: 53
End: 2023-01-01

## 2023-01-01 ENCOUNTER — ALLIED HEALTH/NURSE VISIT (OUTPATIENT)
Dept: NEUROLOGY | Facility: CLINIC | Age: 53
End: 2023-01-01

## 2023-01-01 ENCOUNTER — LAB (OUTPATIENT)
Dept: LAB | Facility: CLINIC | Age: 53
End: 2023-01-01
Payer: COMMERCIAL

## 2023-01-01 ENCOUNTER — HOSPITAL ENCOUNTER (OUTPATIENT)
Dept: SPEECH THERAPY | Facility: CLINIC | Age: 53
Discharge: HOME | End: 2023-04-04
Payer: COMMERCIAL

## 2023-01-01 ENCOUNTER — OFFICE VISIT (OUTPATIENT)
Dept: NEUROLOGY | Facility: CLINIC | Age: 53
End: 2023-01-01
Payer: COMMERCIAL

## 2023-01-01 ENCOUNTER — THERAPY VISIT (OUTPATIENT)
Dept: OCCUPATIONAL THERAPY | Facility: CLINIC | Age: 53
End: 2023-01-01
Payer: COMMERCIAL

## 2023-01-01 ENCOUNTER — HOSPITAL ENCOUNTER (OUTPATIENT)
Dept: SPEECH THERAPY | Facility: CLINIC | Age: 53
Discharge: HOME | End: 2023-05-09
Payer: COMMERCIAL

## 2023-01-01 ENCOUNTER — PATIENT OUTREACH (OUTPATIENT)
Dept: PALLIATIVE CARE | Facility: CLINIC | Age: 53
End: 2023-01-01

## 2023-01-01 ENCOUNTER — HOSPITAL ENCOUNTER (OUTPATIENT)
Dept: SPEECH THERAPY | Facility: CLINIC | Age: 53
Discharge: HOME | End: 2023-01-03
Payer: COMMERCIAL

## 2023-01-01 ENCOUNTER — HEALTH MAINTENANCE LETTER (OUTPATIENT)
Age: 53
End: 2023-01-01

## 2023-01-01 ENCOUNTER — HOSPITAL ENCOUNTER (OUTPATIENT)
Dept: SPEECH THERAPY | Facility: CLINIC | Age: 53
Discharge: HOME | End: 2023-04-07
Payer: COMMERCIAL

## 2023-01-01 ENCOUNTER — OFFICE VISIT (OUTPATIENT)
Dept: PULMONOLOGY | Facility: CLINIC | Age: 53
End: 2023-01-01
Attending: PSYCHIATRY & NEUROLOGY
Payer: COMMERCIAL

## 2023-01-01 ENCOUNTER — TRANSCRIBE ORDERS (OUTPATIENT)
Dept: OTHER | Age: 53
End: 2023-01-01

## 2023-01-01 ENCOUNTER — THERAPY VISIT (OUTPATIENT)
Dept: OCCUPATIONAL THERAPY | Facility: CLINIC | Age: 53
End: 2023-01-01
Attending: PSYCHIATRY & NEUROLOGY
Payer: COMMERCIAL

## 2023-01-01 ENCOUNTER — DOCUMENTATION ONLY (OUTPATIENT)
Dept: HOME HEALTH SERVICES | Facility: CLINIC | Age: 53
End: 2023-01-01
Payer: COMMERCIAL

## 2023-01-01 ENCOUNTER — DOCUMENTATION ONLY (OUTPATIENT)
Dept: OTHER | Facility: CLINIC | Age: 53
End: 2023-01-01

## 2023-01-01 ENCOUNTER — THERAPY VISIT (OUTPATIENT)
Dept: PHYSICAL THERAPY | Facility: CLINIC | Age: 53
End: 2023-01-01
Payer: COMMERCIAL

## 2023-01-01 ENCOUNTER — HOSPITAL ENCOUNTER (EMERGENCY)
Facility: CLINIC | Age: 53
Discharge: HOME | End: 2023-01-06
Attending: EMERGENCY MEDICINE | Admitting: EMERGENCY MEDICINE
Payer: COMMERCIAL

## 2023-01-01 ENCOUNTER — PATIENT OUTREACH (OUTPATIENT)
Dept: CARE COORDINATION | Facility: CLINIC | Age: 53
End: 2023-01-01
Payer: COMMERCIAL

## 2023-01-01 ENCOUNTER — ANCILLARY PROCEDURE (OUTPATIENT)
Dept: GENERAL RADIOLOGY | Facility: CLINIC | Age: 53
End: 2023-01-01
Attending: FAMILY MEDICINE
Payer: COMMERCIAL

## 2023-01-01 ENCOUNTER — ALLIED HEALTH/NURSE VISIT (OUTPATIENT)
Dept: NEUROLOGY | Facility: CLINIC | Age: 53
End: 2023-01-01
Payer: COMMERCIAL

## 2023-01-01 ENCOUNTER — PATIENT OUTREACH (OUTPATIENT)
Dept: NEUROLOGY | Facility: CLINIC | Age: 53
End: 2023-01-01
Payer: COMMERCIAL

## 2023-01-01 ENCOUNTER — PRE VISIT (OUTPATIENT)
Dept: PULMONOLOGY | Facility: CLINIC | Age: 53
End: 2023-01-01

## 2023-01-01 ENCOUNTER — TELEPHONE (OUTPATIENT)
Dept: PALLIATIVE CARE | Facility: CLINIC | Age: 53
End: 2023-01-01

## 2023-01-01 ENCOUNTER — ONCOLOGY VISIT (OUTPATIENT)
Dept: PALLIATIVE CARE | Facility: CLINIC | Age: 53
End: 2023-01-01
Attending: INTERNAL MEDICINE
Payer: COMMERCIAL

## 2023-01-01 ENCOUNTER — OFFICE VISIT (OUTPATIENT)
Dept: DERMATOLOGY | Facility: CLINIC | Age: 53
End: 2023-01-01
Payer: COMMERCIAL

## 2023-01-01 ENCOUNTER — APPOINTMENT (OUTPATIENT)
Dept: GENERAL RADIOLOGY | Facility: CLINIC | Age: 53
End: 2023-01-01
Attending: EMERGENCY MEDICINE
Payer: COMMERCIAL

## 2023-01-01 ENCOUNTER — TELEPHONE (OUTPATIENT)
Dept: CARE COORDINATION | Facility: CLINIC | Age: 53
End: 2023-01-01
Payer: COMMERCIAL

## 2023-01-01 ENCOUNTER — PATIENT OUTREACH (OUTPATIENT)
Dept: CARE COORDINATION | Facility: CLINIC | Age: 53
End: 2023-01-01

## 2023-01-01 ENCOUNTER — HOSPITAL ENCOUNTER (OUTPATIENT)
Dept: SPEECH THERAPY | Facility: CLINIC | Age: 53
Discharge: HOME | End: 2023-01-31
Payer: COMMERCIAL

## 2023-01-01 ENCOUNTER — HOSPITAL ENCOUNTER (OUTPATIENT)
Dept: MRI IMAGING | Facility: CLINIC | Age: 53
Discharge: HOME | End: 2023-05-03
Attending: PSYCHIATRY & NEUROLOGY | Admitting: PSYCHIATRY & NEUROLOGY
Payer: COMMERCIAL

## 2023-01-01 ENCOUNTER — HOSPITAL ENCOUNTER (OUTPATIENT)
Dept: SPEECH THERAPY | Facility: CLINIC | Age: 53
Discharge: HOME | End: 2023-04-11
Payer: COMMERCIAL

## 2023-01-01 ENCOUNTER — HOSPITAL ENCOUNTER (OUTPATIENT)
Dept: SPEECH THERAPY | Facility: CLINIC | Age: 53
Discharge: HOME | End: 2023-04-21
Payer: COMMERCIAL

## 2023-01-01 ENCOUNTER — HOSPITAL ENCOUNTER (OUTPATIENT)
Dept: SPEECH THERAPY | Facility: CLINIC | Age: 53
Discharge: HOME | End: 2023-01-24
Payer: COMMERCIAL

## 2023-01-01 ENCOUNTER — HOSPITAL ENCOUNTER (OUTPATIENT)
Dept: SPEECH THERAPY | Facility: CLINIC | Age: 53
Discharge: HOME | End: 2023-02-01
Payer: COMMERCIAL

## 2023-01-01 ENCOUNTER — HOSPITAL ENCOUNTER (OUTPATIENT)
Dept: SPEECH THERAPY | Facility: CLINIC | Age: 53
Discharge: HOME | End: 2023-05-02
Payer: COMMERCIAL

## 2023-01-01 ENCOUNTER — HOSPITAL ENCOUNTER (OUTPATIENT)
Dept: SPEECH THERAPY | Facility: CLINIC | Age: 53
Discharge: HOME | End: 2023-05-16
Payer: COMMERCIAL

## 2023-01-01 ENCOUNTER — MYC MEDICAL ADVICE (OUTPATIENT)
Dept: NEUROLOGY | Facility: CLINIC | Age: 53
End: 2023-01-01
Payer: COMMERCIAL

## 2023-01-01 ENCOUNTER — HOSPITAL ENCOUNTER (OUTPATIENT)
Dept: SPEECH THERAPY | Facility: CLINIC | Age: 53
Discharge: HOME | End: 2023-04-25
Payer: COMMERCIAL

## 2023-01-01 ENCOUNTER — THERAPY VISIT (OUTPATIENT)
Dept: SPEECH THERAPY | Facility: CLINIC | Age: 53
End: 2023-01-01
Attending: PSYCHIATRY & NEUROLOGY
Payer: COMMERCIAL

## 2023-01-01 ENCOUNTER — DOCUMENTATION ONLY (OUTPATIENT)
Dept: OTHER | Facility: CLINIC | Age: 53
End: 2023-01-01
Payer: COMMERCIAL

## 2023-01-01 ENCOUNTER — HOSPITAL ENCOUNTER (OUTPATIENT)
Dept: SPEECH THERAPY | Facility: CLINIC | Age: 53
Discharge: HOME | End: 2023-01-10
Payer: COMMERCIAL

## 2023-01-01 ENCOUNTER — ANCILLARY PROCEDURE (OUTPATIENT)
Dept: GENERAL RADIOLOGY | Facility: CLINIC | Age: 53
End: 2023-01-01
Attending: PSYCHIATRY & NEUROLOGY
Payer: COMMERCIAL

## 2023-01-01 ENCOUNTER — OFFICE VISIT (OUTPATIENT)
Dept: INTERNAL MEDICINE | Facility: CLINIC | Age: 53
End: 2023-01-01
Payer: COMMERCIAL

## 2023-01-01 ENCOUNTER — OFFICE VISIT (OUTPATIENT)
Dept: URGENT CARE | Facility: URGENT CARE | Age: 53
End: 2023-01-01
Payer: COMMERCIAL

## 2023-01-01 ENCOUNTER — HOSPITAL ENCOUNTER (OUTPATIENT)
Dept: SPEECH THERAPY | Facility: CLINIC | Age: 53
Discharge: HOME | End: 2023-01-27
Payer: COMMERCIAL

## 2023-01-01 VITALS
DIASTOLIC BLOOD PRESSURE: 99 MMHG | BODY MASS INDEX: 31.18 KG/M2 | RESPIRATION RATE: 18 BRPM | OXYGEN SATURATION: 95 % | HEIGHT: 74 IN | WEIGHT: 243 LBS | SYSTOLIC BLOOD PRESSURE: 162 MMHG | HEART RATE: 97 BPM

## 2023-01-01 VITALS
WEIGHT: 223.1 LBS | HEIGHT: 76 IN | OXYGEN SATURATION: 99 % | DIASTOLIC BLOOD PRESSURE: 100 MMHG | BODY MASS INDEX: 27.17 KG/M2 | HEART RATE: 77 BPM | RESPIRATION RATE: 18 BRPM | SYSTOLIC BLOOD PRESSURE: 152 MMHG | TEMPERATURE: 98 F

## 2023-01-01 VITALS
BODY MASS INDEX: 28.98 KG/M2 | WEIGHT: 238 LBS | SYSTOLIC BLOOD PRESSURE: 155 MMHG | HEART RATE: 71 BPM | DIASTOLIC BLOOD PRESSURE: 82 MMHG | HEIGHT: 76 IN | RESPIRATION RATE: 17 BRPM | TEMPERATURE: 97.9 F | OXYGEN SATURATION: 100 %

## 2023-01-01 VITALS
TEMPERATURE: 98.5 F | HEART RATE: 111 BPM | DIASTOLIC BLOOD PRESSURE: 95 MMHG | SYSTOLIC BLOOD PRESSURE: 153 MMHG | OXYGEN SATURATION: 95 %

## 2023-01-01 VITALS
HEART RATE: 86 BPM | BODY MASS INDEX: 30.06 KG/M2 | DIASTOLIC BLOOD PRESSURE: 89 MMHG | WEIGHT: 234.2 LBS | HEIGHT: 74 IN | OXYGEN SATURATION: 96 % | SYSTOLIC BLOOD PRESSURE: 143 MMHG

## 2023-01-01 VITALS
WEIGHT: 241.6 LBS | HEART RATE: 87 BPM | BODY MASS INDEX: 29.41 KG/M2 | SYSTOLIC BLOOD PRESSURE: 171 MMHG | OXYGEN SATURATION: 99 % | DIASTOLIC BLOOD PRESSURE: 94 MMHG

## 2023-01-01 VITALS
SYSTOLIC BLOOD PRESSURE: 144 MMHG | DIASTOLIC BLOOD PRESSURE: 86 MMHG | HEIGHT: 76 IN | HEART RATE: 82 BPM | OXYGEN SATURATION: 99 % | WEIGHT: 226 LBS | BODY MASS INDEX: 27.52 KG/M2

## 2023-01-01 VITALS
SYSTOLIC BLOOD PRESSURE: 138 MMHG | BODY MASS INDEX: 29.02 KG/M2 | DIASTOLIC BLOOD PRESSURE: 82 MMHG | OXYGEN SATURATION: 98 % | TEMPERATURE: 96.7 F | RESPIRATION RATE: 15 BRPM | WEIGHT: 238.4 LBS | HEART RATE: 69 BPM

## 2023-01-01 DIAGNOSIS — Z78.9 IMPAIRED MOBILITY AND ADLS: ICD-10-CM

## 2023-01-01 DIAGNOSIS — G12.21 ALS (AMYOTROPHIC LATERAL SCLEROSIS) (H): ICD-10-CM

## 2023-01-01 DIAGNOSIS — Z78.9 DECREASED ACTIVITIES OF DAILY LIVING (ADL): Primary | ICD-10-CM

## 2023-01-01 DIAGNOSIS — K11.7 SIALORRHEA: ICD-10-CM

## 2023-01-01 DIAGNOSIS — G12.21 ALS (AMYOTROPHIC LATERAL SCLEROSIS) (H): Primary | ICD-10-CM

## 2023-01-01 DIAGNOSIS — R47.1 DYSARTHRIA: Primary | ICD-10-CM

## 2023-01-01 DIAGNOSIS — G12.20 MND (MOTOR NEURONE DISEASE) (H): Primary | ICD-10-CM

## 2023-01-01 DIAGNOSIS — R13.10 DYSPHAGIA: Primary | ICD-10-CM

## 2023-01-01 DIAGNOSIS — F41.1 GAD (GENERALIZED ANXIETY DISORDER): ICD-10-CM

## 2023-01-01 DIAGNOSIS — R06.02 SHORTNESS OF BREATH: ICD-10-CM

## 2023-01-01 DIAGNOSIS — R06.02 SHORTNESS OF BREATH: Primary | ICD-10-CM

## 2023-01-01 DIAGNOSIS — R47.1 DYSARTHRIA: ICD-10-CM

## 2023-01-01 DIAGNOSIS — R26.89 BALANCE PROBLEMS: Primary | ICD-10-CM

## 2023-01-01 DIAGNOSIS — G31.09: ICD-10-CM

## 2023-01-01 DIAGNOSIS — M25.551 RIGHT HIP PAIN: ICD-10-CM

## 2023-01-01 DIAGNOSIS — L82.1 SEBORRHEIC KERATOSIS: ICD-10-CM

## 2023-01-01 DIAGNOSIS — D22.9 NEVUS: ICD-10-CM

## 2023-01-01 DIAGNOSIS — Z78.9 IMPAIRED INSTRUMENTAL ACTIVITIES OF DAILY LIVING (IADL): ICD-10-CM

## 2023-01-01 DIAGNOSIS — S22.41XD CLOSED FRACTURE OF MULTIPLE RIBS OF RIGHT SIDE WITH ROUTINE HEALING, SUBSEQUENT ENCOUNTER: Primary | ICD-10-CM

## 2023-01-01 DIAGNOSIS — G12.20 MND (MOTOR NEURONE DISEASE) (H): ICD-10-CM

## 2023-01-01 DIAGNOSIS — Z51.5 END OF LIFE CARE: ICD-10-CM

## 2023-01-01 DIAGNOSIS — R13.12 OROPHARYNGEAL DYSPHAGIA: ICD-10-CM

## 2023-01-01 DIAGNOSIS — R47.1 ATAXIC DYSARTHRIA: ICD-10-CM

## 2023-01-01 DIAGNOSIS — G11.8 SPINOCEREBELLAR ATAXIA (H): Primary | ICD-10-CM

## 2023-01-01 DIAGNOSIS — Z87.09 HISTORY OF ASTHMA: ICD-10-CM

## 2023-01-01 DIAGNOSIS — T17.908A ASPIRATION INTO RESPIRATORY TRACT, INITIAL ENCOUNTER: ICD-10-CM

## 2023-01-01 DIAGNOSIS — D18.01 ANGIOMA OF SKIN: ICD-10-CM

## 2023-01-01 DIAGNOSIS — R51.9 FACIAL PAIN: ICD-10-CM

## 2023-01-01 DIAGNOSIS — Z71.89 ADVANCE CARE PLANNING: ICD-10-CM

## 2023-01-01 DIAGNOSIS — G12.21: ICD-10-CM
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
## 2023-01-01 DIAGNOSIS — G11.8 SPINOCEREBELLAR ATAXIA (H): ICD-10-CM

## 2023-01-01 DIAGNOSIS — L81.4 LENTIGO: Primary | ICD-10-CM

## 2023-01-01 DIAGNOSIS — Z78.9 DECREASED ACTIVITIES OF DAILY LIVING (ADL): ICD-10-CM

## 2023-01-01 DIAGNOSIS — S22.41XA CLOSED FRACTURE OF MULTIPLE RIBS OF RIGHT SIDE, INITIAL ENCOUNTER: ICD-10-CM

## 2023-01-01 DIAGNOSIS — R13.10 DYSPHAGIA: ICD-10-CM

## 2023-01-01 DIAGNOSIS — R68.89 EXCESSIVE ORAL SECRETIONS: Primary | ICD-10-CM

## 2023-01-01 DIAGNOSIS — R27.0 ATAXIA: ICD-10-CM

## 2023-01-01 DIAGNOSIS — D23.9 DERMAL NEVUS: ICD-10-CM

## 2023-01-01 DIAGNOSIS — F02.80: ICD-10-CM

## 2023-01-01 DIAGNOSIS — R06.89 INEFFECTIVE AIRWAY CLEARANCE: ICD-10-CM

## 2023-01-01 DIAGNOSIS — R05.9 COUGH, UNSPECIFIED TYPE: Primary | ICD-10-CM

## 2023-01-01 DIAGNOSIS — Z74.09 IMPAIRED MOBILITY AND ADLS: ICD-10-CM

## 2023-01-01 LAB
ALT SERPL W P-5'-P-CCNC: 14 U/L (ref 10–50)
AST SERPL W P-5'-P-CCNC: 23 U/L (ref 10–50)
EXPTIME-PRE: 2.54 SEC
EXPTIME-PRE: 3.49 SEC
FEF2575-%PRED-POST: 35 %
FEF2575-%PRED-PRE: 149 %
FEF2575-%PRED-PRE: 68 %
FEF2575-POST: 1.35 L/SEC
FEF2575-PRE: 2.55 L/SEC
FEF2575-PRE: 5.61 L/SEC
FEF2575-PRED: 3.75 L/SEC
FEF2575-PRED: 3.75 L/SEC
FEFMAX-%PRED-PRE: 43 %
FEFMAX-%PRED-PRE: 68 %
FEFMAX-PRE: 4.78 L/SEC
FEFMAX-PRE: 7.48 L/SEC
FEFMAX-PRED: 10.95 L/SEC
FEFMAX-PRED: 10.95 L/SEC
FEV1-%PRED-PRE: 81 %
FEV1-%PRED-PRE: 99 %
FEV1-PRE: 3.45 L
FEV1-PRE: 4.21 L
FEV1FEV6-PRE: 65 %
FEV1FEV6-PRE: 89 %
FEV1FEV6-PRED: 80 %
FEV1FEV6-PRED: 80 %
FEV1FVC-PRE: 70 %
FEV1FVC-PRE: 89 %
FEV1FVC-PRED: 79 %
FEV1FVC-PRED: 79 %
FIFMAX-PRE: 2.91 L/SEC
FIFMAX-PRE: 3.82 L/SEC
FVC-%PRED-PRE: 88 %
FVC-%PRED-PRE: 90 %
FVC-PRE: 4.75 L
FVC-PRE: 4.9 L
FVC-PRED: 5.39 L
FVC-PRED: 5.39 L
MEP-PRE: 39 CMH2O
MIP-PRE: -34 CMH2O
SCANNED LAB RESULT: ABNORMAL
SCANNED LAB RESULT: NORMAL

## 2023-01-01 PROCEDURE — 97535 SELF CARE MNGMENT TRAINING: CPT | Mod: GO

## 2023-01-01 PROCEDURE — 94375 RESPIRATORY FLOW VOLUME LOOP: CPT | Performed by: INTERNAL MEDICINE

## 2023-01-01 PROCEDURE — 92507 TX SP LANG VOICE COMM INDIV: CPT | Mod: GN | Performed by: SPEECH-LANGUAGE PATHOLOGIST

## 2023-01-01 PROCEDURE — 99203 OFFICE O/P NEW LOW 30 MIN: CPT | Performed by: INTERNAL MEDICINE

## 2023-01-01 PROCEDURE — 94799 UNLISTED PULMONARY SVC/PX: CPT | Performed by: INTERNAL MEDICINE

## 2023-01-01 PROCEDURE — 96125 COGNITIVE TEST BY HC PRO: CPT | Mod: GO | Performed by: OCCUPATIONAL THERAPIST

## 2023-01-01 PROCEDURE — 92522 EVALUATE SPEECH PRODUCTION: CPT | Mod: GN | Performed by: SPEECH-LANGUAGE PATHOLOGIST

## 2023-01-01 PROCEDURE — 99203 OFFICE O/P NEW LOW 30 MIN: CPT | Performed by: DERMATOLOGY

## 2023-01-01 PROCEDURE — 94729 DIFFUSING CAPACITY: CPT | Performed by: INTERNAL MEDICINE

## 2023-01-01 PROCEDURE — 97162 PT EVAL MOD COMPLEX 30 MIN: CPT | Mod: GP

## 2023-01-01 PROCEDURE — 70551 MRI BRAIN STEM W/O DYE: CPT | Mod: 26 | Performed by: RADIOLOGY

## 2023-01-01 PROCEDURE — 72070 X-RAY EXAM THORAC SPINE 2VWS: CPT

## 2023-01-01 PROCEDURE — 97112 NEUROMUSCULAR REEDUCATION: CPT | Mod: GP

## 2023-01-01 PROCEDURE — 97110 THERAPEUTIC EXERCISES: CPT | Mod: GP

## 2023-01-01 PROCEDURE — 99213 OFFICE O/P EST LOW 20 MIN: CPT | Performed by: INTERNAL MEDICINE

## 2023-01-01 PROCEDURE — 97165 OT EVAL LOW COMPLEX 30 MIN: CPT | Mod: GO | Performed by: OCCUPATIONAL THERAPIST

## 2023-01-01 PROCEDURE — 99214 OFFICE O/P EST MOD 30 MIN: CPT | Performed by: PSYCHIATRY & NEUROLOGY

## 2023-01-01 PROCEDURE — 92610 EVALUATE SWALLOWING FUNCTION: CPT | Mod: GN | Performed by: SPEECH-LANGUAGE PATHOLOGIST

## 2023-01-01 PROCEDURE — 95885 MUSC TST DONE W/NERV TST LIM: CPT | Mod: 59 | Performed by: PSYCHIATRY & NEUROLOGY

## 2023-01-01 PROCEDURE — 71101 X-RAY EXAM UNILAT RIBS/CHEST: CPT | Mod: 26 | Performed by: RADIOLOGY

## 2023-01-01 PROCEDURE — 72070 X-RAY EXAM THORAC SPINE 2VWS: CPT | Mod: 26 | Performed by: RADIOLOGY

## 2023-01-01 PROCEDURE — 92526 ORAL FUNCTION THERAPY: CPT | Mod: GN | Performed by: SPEECH-LANGUAGE PATHOLOGIST

## 2023-01-01 PROCEDURE — 97162 PT EVAL MOD COMPLEX 30 MIN: CPT | Mod: GP | Performed by: PHYSICAL THERAPIST

## 2023-01-01 PROCEDURE — 95886 MUSC TEST DONE W/N TEST COMP: CPT | Mod: RT | Performed by: PSYCHIATRY & NEUROLOGY

## 2023-01-01 PROCEDURE — 84450 TRANSFERASE (AST) (SGOT): CPT | Performed by: PATHOLOGY

## 2023-01-01 PROCEDURE — 250N000013 HC RX MED GY IP 250 OP 250 PS 637: Performed by: EMERGENCY MEDICINE

## 2023-01-01 PROCEDURE — 99284 EMERGENCY DEPT VISIT MOD MDM: CPT | Performed by: EMERGENCY MEDICINE

## 2023-01-01 PROCEDURE — 74230 X-RAY XM SWLNG FUNCJ C+: CPT | Mod: GC | Performed by: RADIOLOGY

## 2023-01-01 PROCEDURE — 97535 SELF CARE MNGMENT TRAINING: CPT | Mod: GO | Performed by: OCCUPATIONAL THERAPIST

## 2023-01-01 PROCEDURE — 99205 OFFICE O/P NEW HI 60 MIN: CPT | Mod: 25 | Performed by: STUDENT IN AN ORGANIZED HEALTH CARE EDUCATION/TRAINING PROGRAM

## 2023-01-01 PROCEDURE — 73502 X-RAY EXAM HIP UNI 2-3 VIEWS: CPT | Mod: 26 | Performed by: RADIOLOGY

## 2023-01-01 PROCEDURE — 99204 OFFICE O/P NEW MOD 45 MIN: CPT | Performed by: FAMILY MEDICINE

## 2023-01-01 PROCEDURE — 99205 OFFICE O/P NEW HI 60 MIN: CPT | Performed by: INTERNAL MEDICINE

## 2023-01-01 PROCEDURE — 95887 MUSC TST DONE W/N TST NONEXT: CPT | Mod: GC | Performed by: PSYCHIATRY & NEUROLOGY

## 2023-01-01 PROCEDURE — 99285 EMERGENCY DEPT VISIT HI MDM: CPT | Mod: 25

## 2023-01-01 PROCEDURE — 36415 COLL VENOUS BLD VENIPUNCTURE: CPT | Performed by: PATHOLOGY

## 2023-01-01 PROCEDURE — 70450 CT HEAD/BRAIN W/O DYE: CPT

## 2023-01-01 PROCEDURE — 97535 SELF CARE MNGMENT TRAINING: CPT | Mod: GP | Performed by: PHYSICAL THERAPIST

## 2023-01-01 PROCEDURE — 94060 EVALUATION OF WHEEZING: CPT | Performed by: INTERNAL MEDICINE

## 2023-01-01 PROCEDURE — 70450 CT HEAD/BRAIN W/O DYE: CPT | Mod: 26 | Performed by: RADIOLOGY

## 2023-01-01 PROCEDURE — 99215 OFFICE O/P EST HI 40 MIN: CPT | Performed by: PSYCHIATRY & NEUROLOGY

## 2023-01-01 PROCEDURE — 71046 X-RAY EXAM CHEST 2 VIEWS: CPT | Mod: TC | Performed by: RADIOLOGY

## 2023-01-01 PROCEDURE — 97166 OT EVAL MOD COMPLEX 45 MIN: CPT | Mod: GO

## 2023-01-01 PROCEDURE — 92526 ORAL FUNCTION THERAPY: CPT | Performed by: SPEECH-LANGUAGE PATHOLOGIST

## 2023-01-01 PROCEDURE — 84460 ALANINE AMINO (ALT) (SGPT): CPT | Performed by: PATHOLOGY

## 2023-01-01 PROCEDURE — 97166 OT EVAL MOD COMPLEX 45 MIN: CPT | Mod: GO | Performed by: OCCUPATIONAL THERAPIST

## 2023-01-01 PROCEDURE — 99207 PR STATISTIC GENETIC COUNSELING, <16 MIN: CPT | Performed by: GENETIC COUNSELOR, MS

## 2023-01-01 PROCEDURE — 92611 MOTION FLUOROSCOPY/SWALLOW: CPT | Mod: GN | Performed by: SPEECH-LANGUAGE PATHOLOGIST

## 2023-01-01 PROCEDURE — 71101 X-RAY EXAM UNILAT RIBS/CHEST: CPT | Mod: RT

## 2023-01-01 PROCEDURE — 97760 ORTHOTIC MGMT&TRAING 1ST ENC: CPT | Mod: GO | Performed by: OCCUPATIONAL THERAPIST

## 2023-01-01 PROCEDURE — 73502 X-RAY EXAM HIP UNI 2-3 VIEWS: CPT

## 2023-01-01 PROCEDURE — 95912 NRV CNDJ TEST 11-12 STUDIES: CPT | Mod: GC | Performed by: PSYCHIATRY & NEUROLOGY

## 2023-01-01 PROCEDURE — 99417 PROLNG OP E/M EACH 15 MIN: CPT | Performed by: PSYCHIATRY & NEUROLOGY

## 2023-01-01 PROCEDURE — G0463 HOSPITAL OUTPT CLINIC VISIT: HCPCS | Performed by: STUDENT IN AN ORGANIZED HEALTH CARE EDUCATION/TRAINING PROGRAM

## 2023-01-01 PROCEDURE — 70551 MRI BRAIN STEM W/O DYE: CPT

## 2023-01-01 RX ORDER — BARIUM SULFATE 400 MG/ML
30 SUSPENSION ORAL ONCE
Status: COMPLETED | OUTPATIENT
Start: 2023-01-01 | End: 2023-01-01

## 2023-01-01 RX ORDER — GUAIFENESIN 600 MG/1
1200 TABLET, EXTENDED RELEASE ORAL 2 TIMES DAILY PRN
Qty: 120 TABLET | Refills: 1 | Status: SHIPPED | OUTPATIENT
Start: 2023-01-01

## 2023-01-01 RX ORDER — MIRTAZAPINE 15 MG/1
15 TABLET, ORALLY DISINTEGRATING ORAL AT BEDTIME
Qty: 30 TABLET | Refills: 3 | Status: CANCELLED | OUTPATIENT
Start: 2023-01-01

## 2023-01-01 RX ORDER — ALBUTEROL SULFATE 0.83 MG/ML
2.5 SOLUTION RESPIRATORY (INHALATION) EVERY 6 HOURS PRN
Qty: 90 ML | Refills: 3 | Status: SHIPPED | OUTPATIENT
Start: 2023-01-01

## 2023-01-01 RX ORDER — GLYCOPYRROLATE 1 MG/1
1 TABLET ORAL 3 TIMES DAILY PRN
Qty: 90 TABLET | Refills: 1 | Status: SHIPPED | OUTPATIENT
Start: 2023-01-01

## 2023-01-01 RX ORDER — GLYCOPYRROLATE 1 MG/1
1 TABLET ORAL 3 TIMES DAILY PRN
Qty: 90 TABLET | Refills: 1 | Status: SHIPPED | OUTPATIENT
Start: 2023-01-01 | End: 2023-01-01

## 2023-01-01 RX ORDER — ATROPINE SULFATE 10 MG/ML
2 SOLUTION OPHTHALMIC 2 TIMES DAILY
Status: ACTIVE | OUTPATIENT
Start: 2023-01-01 | End: 2023-01-01

## 2023-01-01 RX ORDER — ALBUTEROL SULFATE 90 UG/1
2 AEROSOL, METERED RESPIRATORY (INHALATION) EVERY 6 HOURS PRN
Qty: 18 G | Refills: 3 | Status: SHIPPED | OUTPATIENT
Start: 2023-01-01 | End: 2023-01-01

## 2023-01-01 RX ORDER — ALBUTEROL SULFATE 0.83 MG/ML
2.5 SOLUTION RESPIRATORY (INHALATION) EVERY 6 HOURS PRN
Qty: 90 ML | Refills: 0 | Status: SHIPPED | OUTPATIENT
Start: 2023-01-01 | End: 2023-01-01

## 2023-01-01 RX ORDER — HYDROCODONE BITARTRATE AND ACETAMINOPHEN 5; 325 MG/1; MG/1
1 TABLET ORAL ONCE
Status: COMPLETED | OUTPATIENT
Start: 2023-01-01 | End: 2023-01-01

## 2023-01-01 RX ORDER — IPRATROPIUM BROMIDE AND ALBUTEROL SULFATE 2.5; .5 MG/3ML; MG/3ML
1 SOLUTION RESPIRATORY (INHALATION) PRN
COMMUNITY
Start: 2023-01-01

## 2023-01-01 RX ORDER — ATROPINE SULFATE 10 MG/ML
2 SOLUTION/ DROPS OPHTHALMIC 2 TIMES DAILY PRN
COMMUNITY
Start: 2023-01-01

## 2023-01-01 RX ORDER — MORPHINE SULFATE 20 MG/ML
5-10 SOLUTION ORAL EVERY 4 HOURS PRN
Qty: 15 ML | Refills: 0 | Status: SHIPPED | OUTPATIENT
Start: 2023-01-01

## 2023-01-01 RX ORDER — ESCITALOPRAM OXALATE 20 MG/1
20 TABLET ORAL DAILY
COMMUNITY

## 2023-01-01 RX ORDER — OXYCODONE HYDROCHLORIDE 5 MG/1
5 TABLET ORAL 3 TIMES DAILY PRN
Qty: 21 TABLET | Refills: 0 | Status: SHIPPED | OUTPATIENT
Start: 2023-01-01 | End: 2023-01-01

## 2023-01-01 RX ORDER — RILUZOLE 50 MG/1
50 TABLET, FILM COATED ORAL EVERY 12 HOURS
Qty: 180 TABLET | Refills: 1 | Status: SHIPPED | OUTPATIENT
Start: 2023-01-01

## 2023-01-01 RX ORDER — MIRTAZAPINE 15 MG/1
TABLET, ORALLY DISINTEGRATING ORAL
Qty: 90 TABLET | Refills: 2 | Status: SHIPPED | OUTPATIENT
Start: 2023-01-01

## 2023-01-01 RX ORDER — HYDROXYZINE PAMOATE 25 MG/1
25 CAPSULE ORAL DAILY
COMMUNITY

## 2023-01-01 RX ORDER — MIRTAZAPINE 15 MG/1
15 TABLET, ORALLY DISINTEGRATING ORAL AT BEDTIME
Qty: 30 TABLET | Refills: 3 | Status: SHIPPED | OUTPATIENT
Start: 2023-01-01 | End: 2023-01-01

## 2023-01-01 RX ORDER — ALBUTEROL SULFATE 90 UG/1
2 AEROSOL, METERED RESPIRATORY (INHALATION) EVERY 6 HOURS PRN
Qty: 18 G | Refills: 3 | Status: SHIPPED | OUTPATIENT
Start: 2023-01-01

## 2023-01-01 RX ORDER — BENZONATATE 200 MG/1
200 CAPSULE ORAL 3 TIMES DAILY PRN
Qty: 21 CAPSULE | Refills: 0 | Status: SHIPPED | OUTPATIENT
Start: 2023-01-01 | End: 2023-01-01

## 2023-01-01 RX ORDER — MORPHINE SULFATE 20 MG/ML
5-10 SOLUTION ORAL EVERY 4 HOURS PRN
Qty: 15 ML | Refills: 0 | Status: SHIPPED | OUTPATIENT
Start: 2023-01-01 | End: 2023-01-01

## 2023-01-01 RX ORDER — HYDROXYZINE HYDROCHLORIDE 25 MG/1
25-50 TABLET, FILM COATED ORAL EVERY 6 HOURS PRN
COMMUNITY
Start: 2023-01-01

## 2023-01-01 RX ORDER — OXYCODONE HYDROCHLORIDE 5 MG/1
5 TABLET ORAL EVERY 6 HOURS PRN
Qty: 12 TABLET | Refills: 0 | Status: SHIPPED | OUTPATIENT
Start: 2023-01-01 | End: 2023-01-01

## 2023-01-01 RX ADMIN — BARIUM SULFATE 30 ML: 400 SUSPENSION ORAL at 14:35

## 2023-01-01 RX ADMIN — HYDROCODONE BITARTRATE AND ACETAMINOPHEN 1 TABLET: 5; 325 TABLET ORAL at 02:43

## 2023-01-01 ASSESSMENT — REVISED AMYOTROPHIC LATERAL SCLEROSIS FUNCTIONAL RATING SCALE (ALSFRS-R)
ALSFRS_TOTAL_SCORE: 16
CLIMBING_STAIRS: NEEDS ASSISTANCE
RESPIRATORY_SUBTOTAL_SCORE: 6
RESPIRATORY_INSUFFICIENCY: 3
SIX_ITEM_SUBTOTAL: 8
BULBAR_SUBTOTAL: 3
SALIVATION: MARKED DROOLING; REQUIRES CONSTANT TISSUE OR HANDKERCHIEF
ORTHOPENA: CAN ONLY SLEEP SITTING UP
WALKING: 2
RESPIRATORY_INSUFFICIENCY: INTERMITTENT USE OF NIPPV
DYSPNEA: OCCURS WITH ONE OR MORE OF THE FOLLOWING: EATING, BATHING, DRESSING (ADL)
SPEECH: SPEECH COMBINED WITH NON-VOCAL COMMUNICATION
CLIMBING_STAIRS: 1
DRESSING_AND_HYGEINE: 1
SWALLOWING: DIETARY CONSISTENCY CHANGES
SPEECH: 1
DYSPNEA: 2
TURNING_IN_BED_AND_ADJUSTING_BED_CLOTHES: 2
WALKING: WALKS WITH ASSISTANCE
SALIVATION: 0
HANDWRITING: 0
DRESSING_AND_HYGEINE: NEEDS ATTENDANT FOR SELF-CARE
FINE_MOTOR_SUBTOTAL_SCORE: 2
GROSS_MOTOR_SUBTOTAL_SCORE: 5
HANDWRITING: UNABLE TO GRIP PEN
CUTTING_FOOD_AND_HANDLING_UTENSILES: 1
TURNING_IN_BED_AND_ADJUSTING_BED_CLOTHES: CAN TURN ALONE OR ADJUST SHEETS, BUT WITH GREAT DIFFICULTY
SWALLOWING: 2
ORTHOPENA: 1

## 2023-01-01 ASSESSMENT — ENCOUNTER SYMPTOMS
WHEEZING: 0
RECTAL PAIN: 0
DIZZINESS: 1
COUGH DISTURBING SLEEP: 0
ABDOMINAL PAIN: 0
TROUBLE SWALLOWING: 1
SEIZURES: 0
LOSS OF CONSCIOUSNESS: 0
SINUS PAIN: 0
TASTE DISTURBANCE: 0
DISTURBANCES IN COORDINATION: 1
NUMBNESS: 0
SPEECH CHANGE: 1
TREMORS: 0
BOWEL INCONTINENCE: 0
HEADACHES: 0
POSTURAL DYSPNEA: 1
NECK MASS: 0
SMELL DISTURBANCE: 0
BLOOD IN STOOL: 0
JAUNDICE: 0
COUGH: 1
HEMOPTYSIS: 0
HOARSE VOICE: 1
SNORES LOUDLY: 0
TINGLING: 0
SINUS CONGESTION: 0
SPUTUM PRODUCTION: 0
HEARTBURN: 0
NERVOUS/ANXIOUS: 1
DECREASED CONCENTRATION: 1
VOMITING: 0
SORE THROAT: 0
BLOATING: 0
CONSTIPATION: 0
MEMORY LOSS: 0
WEAKNESS: 1
DYSPNEA ON EXERTION: 1
PANIC: 0
DIARRHEA: 0
INSOMNIA: 1
DEPRESSION: 0
SHORTNESS OF BREATH: 1
PARALYSIS: 0
NAUSEA: 0

## 2023-01-01 ASSESSMENT — PAIN SCALES - GENERAL
PAINLEVEL: NO PAIN (0)

## 2023-01-01 ASSESSMENT — ACTIVITIES OF DAILY LIVING (ADL)
ADLS_ACUITY_SCORE: 35
ADLS_ACUITY_SCORE: 33

## 2023-01-06 NOTE — ED TRIAGE NOTES
52 yr old male arrives via walk in w/ complaints of right rib, hip, and back pain r/t a fall 2 days ago. Pt denies going to the ER on Tuesday but states that his pain escalated today. Pt is unsure if he hit his head. Pt denies being on blood thinners

## 2023-01-06 NOTE — ED PROVIDER NOTES
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  Patient reports that he slipped on the ice onto his right side.  He is unsure if he hit his head.  Patient's spouse reports that he has been managing the pain at home for the past several days, but reports a significant increase tonight.  Wife also reports that the patient has been acting slightly off so she is unsure if he did actually hit his head.  Patient denies being anticoagulated.  He denies abdominal pain.  No other symptoms noted.    Past Medical History  Past Medical History:   Diagnosis Date     Hypertension     intermittent elevated BP readings for several years     Other nervous system complications 12/2021    facial weakness, dysarthria, dysphagia, difficulty with processing complex information, impaired judgment     Pain in joint, shoulder region      Past Surgical History:   Procedure Laterality Date     ORTHOPEDIC SURGERY  5/30/2022    prepatellar bursitis R knee, I&D     alum hydroxide-mag trisilicate (GAVISCON) 80-14.2 MG CHEW chewable tablet  azelastine (ASTELIN) 0.1 % nasal spray  cetirizine (ZYRTEC) 10 MG tablet  fluticasone (FLONASE) 50 MCG/ACT nasal spray  NO ACTIVE MEDICATIONS  pantoprazole (PROTONIX) 40 MG EC tablet  triamcinolone (KENALOG) 0.1 % cream      Allergies   Allergen Reactions     Mold Shortness Of Breath and Unknown     Sulfamethoxazole-Trimethoprim Rash     Family History  Family History   Problem Relation Age of Onset     Breast Cancer Mother      Cancer Mother         breast cancer     Dementia Father         in 70's     Skin Cancer Maternal Grandfather      Social History   Social History     Tobacco Use     Smoking status: Never  "    Smokeless tobacco: Never   Substance Use Topics     Alcohol use: Yes     Comment: 1-2 beers daily     Drug use: No         A medically appropriate review of systems was performed with pertinent positives and negatives noted in the HPI, and all other systems negative.    Physical Exam   BP: (!) 170/97  Pulse: 88  Temp: 97.9  F (36.6  C)  Resp: 17  Height: 193 cm (6' 4\")  Weight: 108 kg (238 lb)  SpO2: 97 %  Physical Exam  Vitals and nursing note reviewed.   Constitutional:       General: He is not in acute distress.     Appearance: He is well-developed. He is not diaphoretic.   HENT:      Head: Normocephalic and atraumatic.      Mouth/Throat:      Pharynx: No oropharyngeal exudate.   Eyes:      General: No scleral icterus.        Right eye: No discharge.         Left eye: No discharge.      Pupils: Pupils are equal, round, and reactive to light.   Cardiovascular:      Rate and Rhythm: Normal rate and regular rhythm.      Heart sounds: Normal heart sounds. No murmur heard.    No friction rub. No gallop.   Pulmonary:      Effort: Pulmonary effort is normal. No respiratory distress.      Breath sounds: Normal breath sounds. No wheezing.   Chest:      Chest wall: No tenderness.   Abdominal:      General: Bowel sounds are normal. There is no distension.      Palpations: Abdomen is soft.      Tenderness: There is no abdominal tenderness.   Musculoskeletal:         General: No tenderness or deformity. Normal range of motion.      Cervical back: Normal range of motion and neck supple.   Skin:     General: Skin is warm and dry.      Coloration: Skin is not pale.      Findings: No erythema or rash.   Neurological:      Mental Status: He is alert and oriented to person, place, and time.      Cranial Nerves: No cranial nerve deficit.           ED Course, Procedures, & Data      Procedures   12:06 AM  The patient was seen and examined by Niko Gray DO in Room [facility].                      No results found for any visits " on 01/05/23.  Medications - No data to display  Labs Ordered and Resulted from Time of ED Arrival to Time of ED Departure - No data to display  XR Thoracic Spine 3 Views    (Results Pending)   Ribs XR, unilat 3 views + PA chest, right    (Results Pending)   XR Pelvis and Hip Right 2 Views    (Results Pending)   CT Head w/o Contrast    (Results Pending)   POC US ABDOMEN LIMITED    (Results Pending)          Medical Decision Making  The patient presented with a problem that is acute and uncomplicated.    The patient's evaluation involved:  ordering and review of 3+ test(s) (imaging)    The patient's management involved only simple and very low risk treatment.      Assessment & Plan    Is a 52-year-old male who presents after fall on the ice.  Patient slipped on ice and had a fall 2 days ago.  He reports right-sided rib, hip and back pain.  He is unaware if he struck his head.  Examno acute abnormalities.  Head CT shows no acute abnormalities.  FAST exam is negative.  X-rays of ribs, hip and pelvis, and back are pending at this time. Patient was signed out to incoming physician pending results of x-rays with plan for likely discharge.    I have reviewed the nursing notes. I have reviewed the findings, diagnosis, plan and need for follow up with the patient.    New Prescriptions    No medications on file       Final diagnoses:   None     Travon DUNBAR, am serving as a trained medical scribe to document services personally performed by Niko Gray DO, based on the provider's statements to me.     Niko DUNBAR DO, was physically present and have reviewed and verified the accuracy of this note documented by Travon Juan.    Niko Gray DO  [facility] EMERGENCY DEPARTMENT  1/5/2023     Niko Gray DO  01/09/23 1117

## 2023-01-06 NOTE — DISCHARGE INSTRUCTIONS
Return to the emergency department if symptoms continue, get worse, there are any new symptoms or any cause for concern. Focus on deep breathing several times per day. Follow up with your clinic doctor early next week.

## 2023-01-11 PROBLEM — S22.41XD CLOSED FRACTURE OF MULTIPLE RIBS OF RIGHT SIDE WITH ROUTINE HEALING, SUBSEQUENT ENCOUNTER: Status: ACTIVE | Noted: 2023-01-01

## 2023-01-11 PROBLEM — R47.1 ATAXIC DYSARTHRIA: Status: ACTIVE | Noted: 2023-01-01

## 2023-01-11 NOTE — PROGRESS NOTES
"  Assessment & Plan     Closed fracture of multiple ribs of right side with routine healing, subsequent encounter  Appears to be doing fairly well post rib fracture.  Pain meds refilled for as needed use.  Continuing with Tylenol use.  Advised not to use an abdominal or chest binder.  Encouraged incentive spirometry.  - oxyCODONE (ROXICODONE) 5 MG tablet; Take 1 tablet (5 mg) by mouth 3 times daily as needed for pain    Ataxic dysarthria  Noted as baseline history.    End of life care  Patient is accompanied by family member.  Patient states he has a POLST and they would like his CODE STATUS updated.  I was unable to locate this within the chart although the patient states that at 1 point it was scanned.  Nonetheless per the patient and discussion with the family also present patient does not want any resuscitative measures.  He wants DNR, DNI status.  These issues were discussed and reiterated with patient.  Patient appears to be able to make sound and informative decisions and understands such.  - No CPR- Do NOT Intubate    He is determining where he would like to establish his new primary care.     MED REC REQUIRED  Post Medication Reconciliation Status:   No changes in medication less refill    BMI:   Estimated body mass index is 29.02 kg/m  as calculated from the following:    Height as of 1/5/23: 1.93 m (6' 4\").    Weight as of this encounter: 108.1 kg (238 lb 6.4 oz).     Return in about 1 month (around 2/11/2023) for follow-up with regular primary care provider.    Renny Smith MD  [facility]    Kelli Orellana is a 52 year old accompanied by his self, presenting for the following health issues:  ER F/U    Patient is a new patient has never been seen at this clinic.    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  " Patient reports that he slipped on the ice onto his right side. He is unsure if he hit his head.    He underwent a formal assessment including multiple imaging studies inclusive of his CT scan of his head.  Incidental note was made of fractures of the right eighth and ninth ribs.    He states he is feeling okay.  His pain is much better today than it was a week ago.  He is out of his pain medication.  He is using Tylenol as well as ibuprofen.    He also would like to make sure that it is documented in his chart that he does not want CPR and DNR/DNI status is elicited.  He states he has a POLST that has been signed and apparently this was scanned into the chart but I was unable to locate    Review of Systems   CONSTITUTIONAL: NEGATIVE for fever, chills, change in weight  EYES: NEGATIVE for vision changes or irritation  ENT/MOUTH: NEGATIVE for ear, mouth and throat problems  CV: NEGATIVE for chest pain, palpitations or peripheral edema  GI: NEGATIVE for nausea, abdominal pain, heartburn, or change in bowel habits  : NEGATIVE for frequency, dysuria, or hematuria  MUSCULOSKELETAL: NEGATIVE for significant arthralgias or myalgia  HEME: NEGATIVE for bleeding problems  PSYCHIATRIC: NEGATIVE for changes in mood or affect    Current Outpatient Medications   Medication     alum hydroxide-mag trisilicate (GAVISCON) 80-14.2 MG CHEW chewable tablet     azelastine (ASTELIN) 0.1 % nasal spray     cetirizine (ZYRTEC) 10 MG tablet     fluticasone (FLONASE) 50 MCG/ACT nasal spray     oxyCODONE (ROXICODONE) 5 MG tablet     pantoprazole (PROTONIX) 40 MG EC tablet     No current facility-administered medications for this visit.           Objective    /82   Pulse 69   Temp (!) 96.7  F (35.9  C) (Temporal)   Resp 15   Wt 108.1 kg (238 lb 6.4 oz)   SpO2 98%   BMI 29.02 kg/m    There is no height or weight on file to calculate BMI.     Physical Exam   GENERAL: alert and no distress  NECK: no adenopathy, no asymmetry, masses,  or scars and thyroid normal to palpation  RESP: lungs clear to auscultation - no rales, rhonchi or wheezes  Mild chest wall pain noted right eighth and ninth intercostal space.  CV: regular rate and rhythm, normal S1 S2, no S3 or S4, no murmur, click or rub  MS: no gross musculoskeletal defects noted, no edema  NEURO: His gait is ataxic and his speech is dysarthric.  PSYCH: mentation appears normal, affect normal/bright

## 2023-02-06 NOTE — TELEPHONE ENCOUNTER
"RN received [facility] alert for Rib Fracture Day 30 questionnaire.  No further follow up needed as patient answered \"No\" to question regarding missed hours of work.     Guerda Barnes RN 02/06/23 2:22 PM  [facility] - Ambulatory Care Management  "

## 2023-03-17 NOTE — PROGRESS NOTES
SUBJECTIVE: Esteban Casillas is a 52 year old male presenting with a chief complaint of cough .  Onset of symptoms was 1 day(s) ago. Also swallowed something wrong yesterday  Course of illness is waxing and waning.    Current and Associated symptoms: uri  Treatment measures tried include Nebulizer (name: alb).  Predisposing factors include HX of asthma.    Past Medical History:   Diagnosis Date     Hypertension     intermittent elevated BP readings for several years     Other nervous system complications 12/2021    facial weakness, dysarthria, dysphagia, difficulty with processing complex information, impaired judgment     Pain in joint, shoulder region      Allergies   Allergen Reactions     Mold Shortness Of Breath and Unknown     Sulfamethoxazole-Trimethoprim Rash     Social History     Tobacco Use     Smoking status: Never     Smokeless tobacco: Never   Substance Use Topics     Alcohol use: Not Currently     Comment: 1-2 beers daily       ROS:  SKIN: no rash  GI: no vomiting    OBJECTIVE:  BP (!) 153/95   Pulse 111   Temp 98.5  F (36.9  C) (Oral)   SpO2 95% GENERAL APPEARANCE: healthy, alert and no distress  EYES: EOMI,  PERRL, conjunctiva clear  HENT: ear canals and TM's normal.  Nose and mouth without ulcers, erythema or lesions  RESP: lungs clear to auscultation - no rales, rhonchi or wheezes  SKIN: no suspicious lesions or rashes    Xray without acute findings, no pneumonia read by Renny De Oliveira D.O.      ICD-10-CM    1. Cough, unspecified type  R05.9 XR Chest 2 Views     benzonatate (TESSALON) 200 MG capsule      2. Aspiration into respiratory tract, initial encounter  T17.908A XR Chest 2 Views      3. History of asthma  Z87.09 albuterol (PROVENTIL) (2.5 MG/3ML) 0.083% neb solution     Nebulizer and Supplies Order for DME - ONLY FOR DME             Fluids/Rest, f/u if worse/not any better

## 2023-03-28 NOTE — TELEPHONE ENCOUNTER
Pt's wife Ruth called and wanted to make sure Dr. Ruiz is aware of some main problems Esteban is having when he comes to his appointment on April 7th.  They are interested in Mnagement and comfort due to Esteban's drastic decline in his condition since his last appointment. He is having problems with  1. Shortness of breath (has been to urgent care lately and had xrays, started neb treatments but not using now, was told at the time that he did not have upper respiratory infection) His shortness of breath increases when even slight activity  2. Anxiety  3. Excessive saliva (difficulty swallowing also, has not had a swallow study since spring 2022, has been going to speech therapy until he was dx with COVID on March 17 and will start back up again on March 31, speech therapist told them she does not see a lot of progress)  4. Decline in speech  5. Muscle weakness (he fell on the ice in Jan and broke ribs)  6.Pt states he doesn't have enough strength to cough and control his saliva at the same time. He drools at night when he lies down.    They would like Dr. Ruiz to address these problems at his next appointment.

## 2023-04-04 NOTE — PROGRESS NOTES
[facility]    Outpatient Speech Language Pathology Progress Note  Patient: Esteban Casillas  : 1970    Beginning/End Dates of Reporting Period:  11/15/2022 to 2023    Referring Provider: Dr. TAMMY Kendrick MD     Therapy Diagnosis: Mild oropharyngeal dysphagia, moderate to severe dysarthria    Client Self Report: Pt reported struggling speaking and articulating during conversation, especially during longer sentences and multi-syllablic words.        Outcome Measures (most recent score):        Goals:  Goal Identifier LTG 2-Dysphagia   Goal Description Patient will consistently utilize the chin tuck and reduced rate of intake strategies to reduce frequency of overt s/sx of aspiration/penetration to <3x/day per Pt and family report.   Target Date 23   Date Met   Goal Not Met - Extend Goal to 2023   Progress (detail required for progress note):  Pt inconsistent with implementation of strategies at home. Continue Goal.      Goal Identifier LTG 4-Exercises/Motor Speech   Goal Description Pt will complete recommended oral motor exercises and SOVT exercises to improve articulatory movement and pitch consistency in order to demonstrate an average of 80% intelligibility for 3+ syllable words in isolation, sentences, and conversation.   Target Date 23   Date Met   Goal Not Met - Extend Goal to 2023   Progress (detail required for progress note):  Pt demonstrating less than 80% intelligibility for words in isolation, sentences, and conversation.          Plan:  Changes to therapy plan of care: Recommend a therapeutic break, reassess after returning from trip to [place] and resume treatment in April.       Discharge:  No    Thank you for referring Esteban Casillas to outpatient therapy at [facility]. Please call Bobbi Lambert MA, SLP-CCC at  [phone] email [email or web address] with any questions or concerns.      Bobbi Lambert M.A., SLP-CCC  Speech-Language Pathologist

## 2023-04-06 NOTE — PROGRESS NOTES
Esteban Casillas is an extremely pleasant 52 year old year old male patient here today for moles on skin.   .   Patient states this has been present for a while.  Patient reports the following symptoms:  none.  Patient reports the following previous treatments none.  These treatments did not work.  Patient reports the following modifying factors none.  Associated symptoms: none.  Patient has no other skin complaints today.  Remainder of the HPI, Meds, PMH, Allergies, FH, and SH was reviewed in chart.      Past Medical History:   Diagnosis Date     Hypertension     intermittent elevated BP readings for several years     Other nervous system complications 12/2021    facial weakness, dysarthria, dysphagia, difficulty with processing complex information, impaired judgment     Pain in joint, shoulder region        Past Surgical History:   Procedure Laterality Date     ORTHOPEDIC SURGERY  5/30/2022    prepatellar bursitis R knee, I&D        Family History   Problem Relation Age of Onset     Breast Cancer Mother      Cancer Mother         breast cancer     Dementia Father         in 70's     Skin Cancer Maternal Grandfather        Social History     Socioeconomic History     Marital status:      Spouse name: Not on file     Number of children: Not on file     Years of education: Not on file     Highest education level: Not on file   Occupational History     Not on file   Tobacco Use     Smoking status: Never     Smokeless tobacco: Never   Vaping Use     Vaping status: Not on file   Substance and Sexual Activity     Alcohol use: Not Currently     Comment: 1-2 beers daily     Drug use: No     Sexual activity: Yes     Partners: Female     Birth control/protection: Female Surgical   Other Topics Concern     Parent/sibling w/ CABG, MI or angioplasty before 65F 55M? No   Social History Narrative     Not on file     Social Determinants of Health     Financial Resource Strain: Not on file   Food Insecurity: Not on file    Transportation Needs: Not on file   Physical Activity: Not on file   Stress: Not on file   Social Connections: Not on file   Intimate Partner Violence: Not on file   Housing Stability: Not on file       Outpatient Encounter Medications as of 4/6/2023   Medication Sig Dispense Refill     albuterol (PROVENTIL) (2.5 MG/3ML) 0.083% neb solution Take 1 vial (2.5 mg) by nebulization every 6 hours as needed for shortness of breath, wheezing or cough 90 mL 0     alum hydroxide-mag trisilicate (GAVISCON) 80-14.2 MG CHEW chewable tablet Take 1-2 tablets by mouth 3 times daily as needed       azelastine (ASTELIN) 0.1 % nasal spray Spray 1 spray in nostril daily       cetirizine (ZYRTEC) 10 MG tablet Take 10 mg by mouth daily       fluticasone (FLONASE) 50 MCG/ACT nasal spray Spray 2 sprays in nostril 2 times daily       oxyCODONE (ROXICODONE) 5 MG tablet Take 1 tablet (5 mg) by mouth 3 times daily as needed for pain 21 tablet 0     pantoprazole (PROTONIX) 40 MG EC tablet Take 40 mg by mouth 2 times daily       No facility-administered encounter medications on file as of 4/6/2023.             O:   NAD, WDWN, Alert & Oriented, Mood & Affect wnl, Vitals stable   Here today alone   General appearance normal   Vitals stable   Alert, oriented and in no acute distress      Following lymph nodes palpated: Occipital, Cervical, Supraclavicular no lad   pigmented macules on trunk and ext with regular borders and pigment networks      Stuck on papules and brown macules on trunk and ext   Red papules on trunk  Flesh colored papules on trunk     The remainder of the full exam was normal; the following areas were examined:  conjunctiva/lids, , neck, peripheral vascular system, abdomen, lymph nodes, digits/nails, eccrine and apocrine glands, scalp/hair, face, neck, chest, abdomen, buttocks, back, RUE, LUE, RLE, LLE       Eyes: Conjunctivae/lids:Normal     ENT: Lips, buccal mucosa, tongue: normal    MSK:Normal    Cardiovascular: peripheral  edema none    Pulm: Breathing Normal    Lymph Nodes: No Head and Neck Lymphadenopathy     Neuro/Psych: Orientation:Alert and Orientedx3 ; Mood/Affect:normal       A/P:  1. Seborrheic keratosis, lentigo, angioma, dermal nevus, nevi   It was a pleasure speaking to Esteban Casillas today.  Previous clinic notes and pertinent laboratory tests were reviewed prior to Esteban Casillas's visit.  Nature of benign skin lesions dicussed with patient.  Signs and Symptoms of skin cancer discussed with patient.  Patient encouraged to perform monthly skin exams.  UV precautions reviewed with patient.  Return to clinic 12 months

## 2023-04-06 NOTE — LETTER
4/6/2023         RE: Esteban Casillas  [address] [place] [id number]        Dear Colleague,    Thank you for referring your patient, Esteban Casillas, to the [facility]. Please see a copy of my visit note below.    Esteban Casillas is an extremely pleasant 52 year old year old male patient here today for moles on skin.   .   Patient states this has been present for a while.  Patient reports the following symptoms:  none.  Patient reports the following previous treatments none.  These treatments did not work.  Patient reports the following modifying factors none.  Associated symptoms: none.  Patient has no other skin complaints today.  Remainder of the HPI, Meds, PMH, Allergies, FH, and SH was reviewed in chart.      Past Medical History:   Diagnosis Date     Hypertension     intermittent elevated BP readings for several years     Other nervous system complications 12/2021    facial weakness, dysarthria, dysphagia, difficulty with processing complex information, impaired judgment     Pain in joint, shoulder region        Past Surgical History:   Procedure Laterality Date     ORTHOPEDIC SURGERY  5/30/2022    prepatellar bursitis R knee, I&D        Family History   Problem Relation Age of Onset     Breast Cancer Mother      Cancer Mother         breast cancer     Dementia Father         in 70's     Skin Cancer Maternal Grandfather        Social History     Socioeconomic History     Marital status:      Spouse name: Not on file     Number of children: Not on file     Years of education: Not on file     Highest education level: Not on file   Occupational History     Not on file   Tobacco Use     Smoking status: Never     Smokeless tobacco: Never   Vaping Use     Vaping status: Not on file   Substance and Sexual Activity     Alcohol use: Not Currently     Comment: 1-2 beers daily     Drug use: No     Sexual activity: Yes     Partners: Female     Birth  control/protection: Female Surgical   Other Topics Concern     Parent/sibling w/ CABG, MI or angioplasty before 65F 55M? No   Social History Narrative     Not on file     Social Determinants of Health     Financial Resource Strain: Not on file   Food Insecurity: Not on file   Transportation Needs: Not on file   Physical Activity: Not on file   Stress: Not on file   Social Connections: Not on file   Intimate Partner Violence: Not on file   Housing Stability: Not on file       Outpatient Encounter Medications as of 4/6/2023   Medication Sig Dispense Refill     albuterol (PROVENTIL) (2.5 MG/3ML) 0.083% neb solution Take 1 vial (2.5 mg) by nebulization every 6 hours as needed for shortness of breath, wheezing or cough 90 mL 0     alum hydroxide-mag trisilicate (GAVISCON) 80-14.2 MG CHEW chewable tablet Take 1-2 tablets by mouth 3 times daily as needed       azelastine (ASTELIN) 0.1 % nasal spray Spray 1 spray in nostril daily       cetirizine (ZYRTEC) 10 MG tablet Take 10 mg by mouth daily       fluticasone (FLONASE) 50 MCG/ACT nasal spray Spray 2 sprays in nostril 2 times daily       oxyCODONE (ROXICODONE) 5 MG tablet Take 1 tablet (5 mg) by mouth 3 times daily as needed for pain 21 tablet 0     pantoprazole (PROTONIX) 40 MG EC tablet Take 40 mg by mouth 2 times daily       No facility-administered encounter medications on file as of 4/6/2023.             O:   NAD, WDWN, Alert & Oriented, Mood & Affect wnl, Vitals stable   Here today alone   General appearance normal   Vitals stable   Alert, oriented and in no acute distress      Following lymph nodes palpated: Occipital, Cervical, Supraclavicular no lad   pigmented macules on trunk and ext with regular borders and pigment networks      Stuck on papules and brown macules on trunk and ext   Red papules on trunk  Flesh colored papules on trunk     The remainder of the full exam was normal; the following areas were examined:  conjunctiva/lids, , neck, peripheral vascular  system, abdomen, lymph nodes, digits/nails, eccrine and apocrine glands, scalp/hair, face, neck, chest, abdomen, buttocks, back, RUE, LUE, RLE, LLE       Eyes: Conjunctivae/lids:Normal     ENT: Lips, buccal mucosa, tongue: normal    MSK:Normal    Cardiovascular: peripheral edema none    Pulm: Breathing Normal    Lymph Nodes: No Head and Neck Lymphadenopathy     Neuro/Psych: Orientation:Alert and Orientedx3 ; Mood/Affect:normal       A/P:  1. Seborrheic keratosis, lentigo, angioma, dermal nevus, nevi   It was a pleasure speaking to Esteban Casillas today.  Previous clinic notes and pertinent laboratory tests were reviewed prior to Esteban Casillas's visit.  Nature of benign skin lesions dicussed with patient.  Signs and Symptoms of skin cancer discussed with patient.  Patient encouraged to perform monthly skin exams.  UV precautions reviewed with patient.  Return to clinic 12 months        Again, thank you for allowing me to participate in the care of your patient.        Sincerely,        Car Cuenca MD

## 2023-04-07 NOTE — PROGRESS NOTES
Mr. Casillas returned for follow-up today.  He is a 52-year-old man with progressive speech & swallowing difficulty. He is accompanied by his wife, Lupe.     He reports that he first noticed symptoms rather rapidly in December 2021 that his speech was significantly slurred.  He has difficulty with the swallowing as well as breathing with some stridor at night according to his wife who is also a nurse.    Today they tell tell me that his condition has significantly progressed in terms of speech problem is a still slightly barely intelligible but he has generalized weakness of articulatory muscles and very slurred and slow speech.  He also has difficulty with his swallowing.  His balance has not really progressed as such since the last visit about a year ago.    He did undergo an EMG twice which was negative.  The latest was with Dr. Mcclain back in July 2022.  There was decreased recruitment of the tongue muscles but no fibrillations.      Past Medical History:   Diagnosis Date     Hypertension     intermittent elevated BP readings for several years     Other nervous system complications 12/2021    facial weakness, dysarthria, dysphagia, difficulty with processing complex information, impaired judgment     Pain in joint, shoulder region      Current Outpatient Medications   Medication     alum hydroxide-mag trisilicate (GAVISCON) 80-14.2 MG CHEW chewable tablet     azelastine (ASTELIN) 0.1 % nasal spray     cetirizine (ZYRTEC) 10 MG tablet     fluticasone (FLONASE) 50 MCG/ACT nasal spray     pantoprazole (PROTONIX) 40 MG EC tablet     albuterol (PROVENTIL) (2.5 MG/3ML) 0.083% neb solution     oxyCODONE (ROXICODONE) 5 MG tablet     Current Facility-Administered Medications   Medication     atropine 1 % (PF) ophthalmic solution 2 drop     Family History   Problem Relation Age of Onset     Breast Cancer Mother      Cancer Mother         breast cancer     Dementia Father         in 70's     Skin Cancer Maternal  Grandfather      Social History     Socioeconomic History     Marital status:      Spouse name: Not on file     Number of children: Not on file     Years of education: Not on file     Highest education level: Not on file   Occupational History     Not on file   Tobacco Use     Smoking status: Never     Smokeless tobacco: Never   Vaping Use     Vaping status: Not on file   Substance and Sexual Activity     Alcohol use: Not Currently     Comment: 1-2 beers daily     Drug use: No     Sexual activity: Yes     Partners: Female     Birth control/protection: Female Surgical   Other Topics Concern     Parent/sibling w/ CABG, MI or angioplasty before 65F 55M? No   Social History Narrative     Not on file     Social Determinants of Health     Financial Resource Strain: Not on file   Food Insecurity: Not on file   Transportation Needs: Not on file   Physical Activity: Not on file   Stress: Not on file   Social Connections: Not on file   Intimate Partner Violence: Not on file   Housing Stability: Not on file     Physical examination:.  He is a speech has definitely worsened.  He is now barely intelligible.  His speech is characteristically both cerebellar and spastic.  His upper and lower extremities showed normal tone muscle bulk and power of 5/5 throughout reflexes were brisk but symmetrical in the upper and lower extremities.    Today I saw no definite fasciculations involving the right deltoid and the shoulder and shoulder girdle muscles.  I could not see for sure any fasciculations in the lower extremities or the left side.  Dysmetria of the upper extremities he could do  tandem for more than 10 seconds tandem walking was only slightly impaired.  He has no nystagmus.    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    I will repeat the EMG preferably with Dr. Mcclain to compare it to the prior EMG.  I  will also repeat the MRI of the brain to rule out further atrophy of the cerebellum and perhaps any signs to suggest multiple system atrophy.  With regard to  shortness of breath this is quite concerning and I would refer him to pulmonary.  He was wondering about palliative care however he is seeing his primary physician next week and I advised him to address that with his primary physician.    I will see him back after these tests.

## 2023-04-07 NOTE — TELEPHONE ENCOUNTER
[facility] Call Center    Phone Message    May a detailed message be left on voicemail: yes     Reason for Call: Medication Question or concern regarding medication   Prescription Clarification  Name of Medication: atropine 1 % (PF) ophthalmic solution 2 drop  Prescribing Provider:     Pharmacy: [id number] - [place], [place] - [address]   What on the order needs clarification?  Ruth patients wife called, would like to know if provider faxed new medication to their pharmacy yet, it was not clear on the AVS given to them after his appt today. Please call back to let her know.         Action Taken: Message routed to:  [facility] [place]    Travel Screening: Not Applicable

## 2023-04-12 NOTE — TELEPHONE ENCOUNTER
[facility] Call Center    Phone Message    May a detailed message be left on voicemail: yes     Reason for Call: Appointment Intake    Referring Provider Name: Dr Ruiz  Diagnosis and/or Symptoms: SOB Dysarthria [R47.1] per Dr Ruiz referral states 1-2 weeks    Action Taken: Other: pulm    Travel Screening: Not Applicable

## 2023-04-17 NOTE — TELEPHONE ENCOUNTER
RECORDS RECEIVED FROM: internal /ce   DATE RECEIVED: 4.26.23    NOTES STATUS DETAILS   OFFICE NOTE from referring provider internal  Mayur Ruiz MD   OFFICE NOTE from other specialist gerber willis-   3.17.23 [place]    DISCHARGE SUMMARY from hospital     DISCHARGE REPORT from the ER internal  3/17/23 [place]      MEDICATION LIST internal     IMAGING  (NEED IMAGES AND REPORTS)     CT SCAN     CHEST XRAY (CXR) internal  Scheduled 4.26.23     3.17.23   TESTS     PULMONARY FUNCTION TESTING (PFT) internal  Scheduled 4.26.23

## 2023-04-18 NOTE — TELEPHONE ENCOUNTER
Spoke with Fátima and rescheduled appt with Dr. Samuels to 4/26/23 at 1:35 PM. Rescheduled FPFT to 4/24/23. Cancelled CXR and lab appt. Confirmed appt dates/times with Fátima. Pt will fill out Consent to Communicate form at next [place] appt.

## 2023-04-26 NOTE — PROGRESS NOTES
[facility]    Name: Esteban Casillas MRN: [id number]     Age: 52 year old   YOB: 1970     Reason for Visit: progressive dyspnea          HPI:   Esteban Casillas is a 52 year old male with history of  Allergies, GERD, ? Neurologic disease (ataxia vs NMD?) who presents with progressive dyspnea.    From a neurologic history standpoint: symptom onset was in 12/2021, initially with slurred speech, as well as difficulty swallowing, stridor, generalized muscle weakness. He had a large workup at [facility] without definitive diagnosis, and has established care at [facility] with further workup in process. 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    History is provided by Esteban and his wife Fátima (who is a nurse). They have noted heavy breathing, effortful with activity, worsening over the last few months.  He has always been a nose breather, and continues to do this, which makes his breathing sound even louder. His wife notices when does even minimal activity like getting dressed, or bending over, his respiratory rate increases. When he walks a longer distance, or up stairs, he cant talk at the same time due to SOB. He notes his breathing is more comfortable sleeping on his side compared on on his back.  His wife has also noticed stridor when lying on his back at night, improves when sleeping on his side. He also has some shallow, irregular breathing overnight, no snoring unless he is sleeping on his back.    His first pulmonary symptom was actually a cough and hoarse voice, they tried treatments for post nasal drip which didn't help much but he does continue to take flonase and zyrtec. Then went to GI for EGD for GERD and found an esophageal stricture which was dilated, but no improvement in cough or hoarseness.   He has had some episodes of aspiration, his wife notices coughing after taking PO somewhat  frequently. He does take meds with applesauce, and tucks his chin with thin liquids to minimize this, but is not interested in thickened liquids.   He has had issues with managing saliva, but recently started on both lexapro and hydroxyzine and this has caused dry mouth, which is actually helping him.    In March he did contract COVID for the first time, course complicated by a reactive cough which improved with prednisone.    10 point ROS performed and negative except for as noted in HPI.         Social History:     Tobacco: never  Exposures: none  Home: some dust, mold (old house). Pet cat. Not sure about down pillow.         Past Medical History:     Past Medical History:   Diagnosis Date    Hypertension     intermittent elevated BP readings for several years    Other nervous system complications 12/2021    facial weakness, dysarthria, dysphagia, difficulty with processing complex information, impaired judgment    Pain in joint, shoulder region            Past Surgical History:      Past Surgical History:   Procedure Laterality Date    ORTHOPEDIC SURGERY  5/30/2022    prepatellar bursitis R knee, I&D           Family History:     Family History   Problem Relation Age of Onset    Breast Cancer Mother     Cancer Mother         breast cancer    Dementia Father         in 70's    Skin Cancer Maternal Grandfather            Allergies:     Allergies   Allergen Reactions    Mold Shortness Of Breath and Unknown    Sulfamethoxazole-Trimethoprim Rash           Medications:   alum hydroxide-mag trisilicate (GAVISCON) 80-14.2 MG CHEW chewable tablet, Take 1-2 tablets by mouth 3 times daily as needed  azelastine (ASTELIN) 0.1 % nasal spray, Spray 1 spray in nostril daily  cetirizine (ZYRTEC) 10 MG tablet, Take 10 mg by mouth daily  escitalopram (LEXAPRO) 20 MG tablet, Take 10 mg by mouth daily  fluticasone (FLONASE) 50 MCG/ACT nasal spray, Spray 2 sprays in nostril 2 times daily  hydrOXYzine (VISTARIL) 25 MG capsule, Take 25  "mg by mouth daily  pantoprazole (PROTONIX) 40 MG EC tablet, Take 40 mg by mouth 2 times daily  albuterol (PROVENTIL) (2.5 MG/3ML) 0.083% neb solution, Take 1 vial (2.5 mg) by nebulization every 6 hours as needed for shortness of breath, wheezing or cough  oxyCODONE (ROXICODONE) 5 MG tablet, Take 1 tablet (5 mg) by mouth 3 times daily as needed for pain (Patient not taking: Reported on 4/7/2023)    No current facility-administered medications on file prior to visit.         Exam:   BP (!) 162/99   Pulse 97   Resp 18   Ht 1.88 m (6' 2\")   Wt 110.2 kg (243 lb)   SpO2 95%   BMI 31.20 kg/m      Gen: sitting upright, in no distress  HEENT: atraumatic, EOMI  Oropharynx:  CV: RRR, no peripheral edema noted  Resp: no increased WOB  Abd: not distended, non-tender  Skin: no rashes or lesions on exposed skin  Extremities: moving all extremities, no gross deformities  Neuro: alert and oriented no FND         Data:   Hgb: wnl 5/29/2022 15.8    WBC/Eos: no eos on diff 4/13/2022    Blood gas: none available  Overnight oximetry 4/27/2022     CXR 3/17/2023 FINDINGS: No consolidation. No pleural effusions or pneumothorax.  Unchanged cardiac size.    PFT 4/24/2023     ECHO: none available         Assessment and Recommendations:   Dyspnea with minimal exertion  Reduced MIP and MEP  Unknown ataxia vs neuromuscular disorder    Mr. Casillas presents to clinic today to [facility]. He has progressive decline in speech and swallow functions since 12/2021 (neuro diagnosis still unclear- ataxia vs NMD), and now with worsening dyspnea over the last several months. Based on PFT flow volume loops and reduced MIP and MEP, PFTs are consistent with neuromuscular disease. Seated/supine testing suggests no significant diaphragm dysfunction contributing to dyspnea.  For his secretion management and difficulty expectorating, we will give him an aerobika for cough assist. We can escalate this therapy as needed. He will use albuterol nebulizer " to help mobilize.  For his dyspnea, we suspect a large component is related to progressive neurologic symptoms. For this, we have suggested consideration of NIV to help offload the work of breathing, at least while sleeping and prn while awake. He is not interested in this now, but open to consideration in the future. He had an overnight oximetry with hypoxemia 4/2022, we will obtain 6-minute walk with oxygen titration to evaluate hypoxemia at next visit. No blood gas available, but could consider this to evaluate hypoventilation for NIV qualification as well. Some concern for NIV with bulbar symptoms, but sounds like they are manageable at this time.    - trial of prn albuterol inhaler for SOB  - aerobika valve + albuterol nebulizer for airway clearance. If helpful but unable to perform would consider mechanical device such as volera (if able to make a seal with bulbar weakness) or vest therapy.  - consider NIV, Mr. Casillas not interested at this time but would consider in the future pending symtpoms  - 6-minute walk test with O2 titration, and full PFTs prior to next visit given rapid worsening of dyspnea over the last few months.    Patient staffed with Dr. Perez. Return to clinic in 3 months with repeat PFTs prior.    Kelley Samuels DO  Pulmonary and Critical Care Fellow      I saw and evaluated patient with Fellow.  Case discussed - agree with note.  I reviewed PFT: reduced MIP and MEP, flow volume loop morphology consistent with neuromuscular weakness.  Overnight oximetry shows significant hypoxia, which would qualify him for overnight supplemental oxygen.  I would recommend repeat DANIELLE study on 2 LPL oxygen to start.    NISSA PEREZ M.D.

## 2023-04-26 NOTE — LETTER
4/26/2023         RE: Esteban Casillas  [address] [facility] [address]        Dear Colleague,    Thank you for referring your patient, Esteban Casillas, to the [facility] [place]. Please see a copy of my visit note below.    [facility]    Name: Esteban Casillas MRN: [id number]     Age: 52 year old   YOB: 1970     Reason for Visit: progressive dyspnea          HPI:   Esteban Casillas is a 52 year old male with history of  Allergies, GERD, ? Neurologic disease (ataxia vs NMD?) who presents with progressive dyspnea.    From a neurologic history standpoint: symptom onset was in 12/2021, initially with slurred speech, as well as difficulty swallowing, stridor, generalized muscle weakness. He had a large workup at [facility] without definitive diagnosis, and has established care at [facility] with further workup in process. 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    History is provided by Esteban and his wife Fátima (who is a nurse). They have noted heavy breathing, effortful with activity, worsening over the last few months.  He has always been a nose breather, and continues to do this, which makes his breathing sound even louder. His wife notices when does even minimal activity like getting dressed, or bending over, his respiratory rate increases. When he walks a longer distance, or up stairs, he cant talk at the same time due to SOB. He notes his breathing is more comfortable sleeping on his side compared on on his back.  His wife has also noticed stridor when lying on his back at night, improves when sleeping on his side. He also has some shallow, irregular breathing overnight, no snoring unless he is sleeping on his back.    His first pulmonary symptom was actually a cough and hoarse voice, they tried treatments for post nasal  drip which didn't help much but he does continue to take flonase and zyrtec. Then went to GI for EGD for GERD and found an esophageal stricture which was dilated, but no improvement in cough or hoarseness.   He has had some episodes of aspiration, his wife notices coughing after taking PO somewhat frequently. He does take meds with applesauce, and tucks his chin with thin liquids to minimize this, but is not interested in thickened liquids.   He has had issues with managing saliva, but recently started on both lexapro and hydroxyzine and this has caused dry mouth, which is actually helping him.    In March he did contract COVID for the first time, course complicated by a reactive cough which improved with prednisone.    10 point ROS performed and negative except for as noted in HPI.         Social History:     Tobacco: never  Exposures: none  Home: some dust, mold (old house). Pet cat. Not sure about down pillow.         Past Medical History:     Past Medical History:   Diagnosis Date    Hypertension     intermittent elevated BP readings for several years    Other nervous system complications 12/2021    facial weakness, dysarthria, dysphagia, difficulty with processing complex information, impaired judgment    Pain in joint, shoulder region            Past Surgical History:      Past Surgical History:   Procedure Laterality Date    ORTHOPEDIC SURGERY  5/30/2022    prepatellar bursitis R knee, I&D           Family History:     Family History   Problem Relation Age of Onset    Breast Cancer Mother     Cancer Mother         breast cancer    Dementia Father         in 70's    Skin Cancer Maternal Grandfather            Allergies:     Allergies   Allergen Reactions    Mold Shortness Of Breath and Unknown    Sulfamethoxazole-Trimethoprim Rash           Medications:   alum hydroxide-mag trisilicate (GAVISCON) 80-14.2 MG CHEW chewable tablet, Take 1-2 tablets by mouth 3 times daily as needed  azelastine (ASTELIN) 0.1 %  "nasal spray, Spray 1 spray in nostril daily  cetirizine (ZYRTEC) 10 MG tablet, Take 10 mg by mouth daily  escitalopram (LEXAPRO) 20 MG tablet, Take 10 mg by mouth daily  fluticasone (FLONASE) 50 MCG/ACT nasal spray, Spray 2 sprays in nostril 2 times daily  hydrOXYzine (VISTARIL) 25 MG capsule, Take 25 mg by mouth daily  pantoprazole (PROTONIX) 40 MG EC tablet, Take 40 mg by mouth 2 times daily  albuterol (PROVENTIL) (2.5 MG/3ML) 0.083% neb solution, Take 1 vial (2.5 mg) by nebulization every 6 hours as needed for shortness of breath, wheezing or cough  oxyCODONE (ROXICODONE) 5 MG tablet, Take 1 tablet (5 mg) by mouth 3 times daily as needed for pain (Patient not taking: Reported on 4/7/2023)    No current facility-administered medications on file prior to visit.         Exam:   BP (!) 162/99   Pulse 97   Resp 18   Ht 1.88 m (6' 2\")   Wt 110.2 kg (243 lb)   SpO2 95%   BMI 31.20 kg/m      Gen: sitting upright, in no distress  HEENT: atraumatic, EOMI  Oropharynx:  CV: RRR, no peripheral edema noted  Resp: no increased WOB  Abd: not distended, non-tender  Skin: no rashes or lesions on exposed skin  Extremities: moving all extremities, no gross deformities  Neuro: alert and oriented no FND         Data:   Hgb: wnl 5/29/2022 15.8    WBC/Eos: no eos on diff 4/13/2022    Blood gas: none available  Overnight oximetry 4/27/2022     CXR 3/17/2023 FINDINGS: No consolidation. No pleural effusions or pneumothorax.  Unchanged cardiac size.    PFT 4/24/2023     ECHO: none available         Assessment and Recommendations:   Dyspnea with minimal exertion  Reduced MIP and MEP  Unknown ataxia vs neuromuscular disorder    Mr. Casillas presents to clinic today to [facility]. He has progressive decline in speech and swallow functions since 12/2021 (neuro diagnosis still unclear- ataxia vs NMD), and now with worsening dyspnea over the last several months. Based on PFT flow volume loops and reduced MIP and MEP, PFTs are " consistent with neuromuscular disease. Seated/supine testing suggests no significant diaphragm dysfunction contributing to dyspnea.  For his secretion management and difficulty expectorating, we will give him an aerobika for cough assist. We can escalate this therapy as needed. He will use albuterol nebulizer to help mobilize.  For his dyspnea, we suspect a large component is related to progressive neurologic symptoms. For this, we have suggested consideration of NIV to help offload the work of breathing, at least while sleeping and prn while awake. He is not interested in this now, but open to consideration in the future. He had an overnight oximetry with hypoxemia 4/2022, we will obtain 6-minute walk with oxygen titration to evaluate hypoxemia at next visit. No blood gas available, but could consider this to evaluate hypoventilation for NIV qualification as well. Some concern for NIV with bulbar symptoms, but sounds like they are manageable at this time.    - trial of prn albuterol inhaler for SOB  - aerobika valve + albuterol nebulizer for airway clearance. If helpful but unable to perform would consider mechanical device such as volera (if able to make a seal with bulbar weakness) or vest therapy.  - consider NIV, Mr. Casillas not interested at this time but would consider in the future pending symtpoms  - 6-minute walk test with O2 titration, and full PFTs prior to next visit given rapid worsening of dyspnea over the last few months.    Patient staffed with Dr. Everett. Return to clinic in 3 months with repeat PFTs prior.    Kelley Samuels DO  Pulmonary and Critical Care Fellow      I saw and evaluated patient with Fellow.  Case discussed - agree with note.  I reviewed PFT: reduced MIP and MEP, flow volume loop morphology consistent with neuromuscular weakness.  Overnight oximetry shows significant hypoxia, which would qualify him for overnight supplemental oxygen.  I would recommend repeat DANIELLE study on 2 LPL  oxygen to start.    NISSA PEREZ M.D.

## 2023-04-26 NOTE — PATIENT INSTRUCTIONS
It was nice to meet you today!    New prescriptions for albuterol neublizer to use with the aerobika valve, and an as needed albuterol inhaler with spacer    We will have you repeat testing in 3 months, including a walk test.    Follow up with me in 3 months.

## 2023-04-26 NOTE — NURSING NOTE
Chief Complaint   Patient presents with     Consult     New consult on Dysarthria     Renny Griffith, CMA CMA at 1:33 PM on 4/26/2023

## 2023-05-09 NOTE — TELEPHONE ENCOUNTER
Patient Contacted for the patient to call back and schedule the following:    Appointment type: RTN  Provider: Arvind   Return date: 08/02/2023  Specialty phone number: [phone]  Additional appointment(s) needed: PFT, 6MWT  Additonal Notes: Patient requested reschedule appointments and send MyChart [facility]

## 2023-05-10 NOTE — PROGRESS NOTES
"     Speech-Language Pathology Department   EVALUATION  [facility]  Video Swallow Study      05/09/23 1500   General Information   Type Of Visit Initial   Start Of Care Date 05/09/23   Referring Physician Mayur Ruiz MD   Orders Evaluate And Treat   Orders Comment VFSS   Medical Diagnosis Spinocerebellar ataxia   Onset Of Illness/injury Or Date Of Surgery 05/09/23   Precautions/limitations No Known Precautions/limitations   Hearing Pt able to interact in conversation throughout evaluation.   Pertinent History of Current Problem/OT: Additional Occupational Profile Info Esteban is a 52 year old male with PMHx of spinocerebellar ataxia, GERD, worsening dyspnea, esophageal stricture resulting in dilation and continued speech/swallowing decline since 12/2021. Pt had VFSS completed in 5/2022 which showed flash penetration on sequential sips. Pt had clinical swallow evaluation completed on 8/16/22 in which pt was trained on chin tuck with thin liquids and provided with swallowing goals (pharyngeal exercises and safe swallowing exercises). Pt presents today for VFSS to re-assess swallowing function. Pt reports that he has increased difficulty with chips and nuts especially with chewing these. Pt reports that he was having trouble with saliva management, however, this difficulty has decreased since experiencing dry mouth d/t new medication. Pt reports that he notices his saliva occasionally \"goes down the wrong pipe\". Pt reports that he usually coughs with thin liquids like water and that the chin tuck has been helping to decrease these episodes. Denies pain with swallowing or recent PNAs. Pt reports hx of GERD and that he takes pantoprazole x2/day for this. Pt reports that he takes his medications crushed in applesauce or pudding.   Respiratory Status Room air   Prior Level Of Function Swallowing   Prior Level Of Function Comment Regular solids with thin liquids " (utilizes chin tuck with thin liquids)   General Observations Pt was pleasant and cooperative throughout the evaluation.   Patient/family Goals Pt would like to know where his current swallowing fx is at.   Clinical Swallow Evaluation   Oral Musculature generally intact   Structural Abnormalities none present   Dentition present and adequate   Mucosal Quality good   Mandibular Strength and Mobility intact   Oral Labial Strength and Mobility impaired pursing;impaired retraction  (Pt demonstrates right side facial weakness)   Lingual Strength and Mobility impaired coordination;impaired protrusion  (Limited ROM for protrusion, tongue deviates slightly to the left)   Velar Elevation intact   Buccal Strength and Mobility impaired  (weakness noted)   Laryngeal Function Throat clear;Swallow;Voicing initiated   VFSS Evaluation   VFSS Additional Documentation Yes   VFSS Eval: Radiology   Radiologist Resident Radiologist   Views Taken left lateral;A/P   Physical Location of Procedure [facility] Wing - 1st floor   VFSS Eval: Thin Liquid Texture Trial   Mode of Presentation, Thin Liquid cup;straw;self-fed   Order of Presentation Chin tuck series 1, 5, 12 and 14; Regular series 2, 3, 4, 10, 15 18, 19 and 21 (A/P); Straw series 17   Preparatory Phase WFL   Oral Phase, Thin Liquid Residue in oral cavity  (trace to mild)   Pharyngeal Phase, Thin Liquid Delayed swallow reflex;Residue in valleculae  (Triggered in valleculae with chin tuck and triggered in pyriform without chin tuck; trace residue)   Rosenbek's Penetration Aspiration Scale: Thin Liquid Trial Results 4 - contrast contacts vocal cords, no residue remains (penetration)   Response to Aspiration absent response, silent aspiration   Diagnostic Statement Pt demonstrates delayed swallowing reflex that is triggered in pyriform sinuses and trace oral residue without use of chin tuck. Without chin tuck anywhere from flash penetration to deep penetration to vocal folds  without residue is noted. When chin is utilized pt demonstrated slightly delayed swallowing reflex that is triggered in valleculae as well as trace residue in valleculae and mild residue in oral cavity. WIth chin tuck pt demonstrates flash penetration.   VFSS Eval: Mildly Thick Liquids    Mode of Presentation cup;self-fed   Order of Presentation Series 6 and 7   Preparatory Phase WFL   Oral Phase Residue in oral cavity;Premature pharyngeal entry  (mild)   Pharyngeal Phase Residue in valleculae;Delayed swallow reflex   Rosenbek's Penetration Aspiration Scale 1 - no aspiration, contrast does not enter airway   Diagnostic Statement Pt demonstrated premature pharyngeal entry x1, delayed swallowing reflex that is triggered in pyriform sinuses, and mild residue in oral cavity and valleculae. No penetration or aspiration noted.   VFSS Eval: Puree Solid Texture Trial   Mode of Presentation, Puree spoon;self-fed   Order of Presentation Series 8, 9 and 20 (A/P)   Preparatory Phase WFL   Oral Phase, Puree Effortful AP movement;Residue in oral cavity  (mild)   Pharyngeal Phase, Puree Residue in valleculae;Delayed swallow reflex  (mild)   Rosenbek's Penetration Aspiration Scale: Puree Food Trial Results 1 - no aspiration, contrast does not enter airway   Diagnostic Statement Pt demonstrated lingual pumping, effortful AP movement, slightly delayed swallowing reflex that is triggered in valleculae, and mild residue in oral cavity and valleculae. No penetration or aspiration noted.   VFSS Eval: Regular Texture Trial (Solid)   Mode of Presentation spoon;self-fed   Order of Presentation Cookie series 11 and 13; barium tablet series 16   Preparatory Phase Insufficient mastication  (extended mastication)   Oral Phase Effortful AP movement;Residue in oral cavity  (mild to moderate)   Pharyngeal Phase Delayed swallow reflex   Rosenbek's Penetration Aspiration Scale 1 - no aspiration, contrast does not enter airway   Diagnostic Statement  Pt demonstrated insufficient mastication/extended mastication, effortful AP movement, slightly delayed swallowing reflex that is triggered in valleculae and mild to modertae oral residue. No penetration or aspiration noted.   Swallow Compensations   Swallow Compensations Chin tuck   Results Other (see comments)  (Flash penetration)   Educational Assessment   Barriers to Learning No barriers   Esophageal Phase of Swallow   Patient reports or presents with symptoms of esophageal dysphagia Yes   Esophageal sweep performed during today s vidofluoroscopic exam  Yes;Please refer to radiologist's report for details   Esophageal comments Pt has hx of GERD and esophageal stricture with dilation. Please refer to previous notes from GI for more information.   Swallow Eval: Clinical Impressions   Skilled Criteria for Therapy Intervention No problems identified which require skilled intervention   Functional Assessment Scale (FAS) 4   Dysphagia Outcome Severity Scale (BRI) Level 4 - BRI   Treatment Diagnosis Mild to moderate oropharyngeal dysphagia   Diet texture recommendations Thin liquids (level 0);Regular diet  (Pt can continue to use chin tuck as this appeared to work well during VFSS)   Recommended Feeding/Eating Techniques alternate between small bites and sips of food/liquid;maintain upright posture during/after eating for 30 mins;small sips/bites;no straws   Demonstrates Need for Referral to Another Service   (Pt should continue to follow up with GI and see current OP SLP)   Risks and Benefits of Treatment have been explained. Yes   Patient, family and/or staff in agreement with Plan of Care Yes   Clinical Impression Comments Overall, pt demonstrates mild to moderate oropharyngeal dysphagia. Pts oral mechanism was remarkable for right facial weakness, limited ROM upon lip pursing, slight left lingual deviation and reduced ROM upon protrusion, and impaired lingual coordination. Swallowing function characterized by  premature spillage x1, trace to moderate oral residue, delayed swallowing reflex that is triggered either in valleculae or pyriform sinuses, and trace to mild vallecular residue. Additionally noted that pt demonstrates effortful AP movement with both solid consistencies and extended mastication with regular solid consistency. Pt demonstrated anywhere from flash penetration to deep penetration to the vocal folds without residue with thin liquid consistency. Pt was previously trained to use chin tuck therefore trials utilizing this technique were completed. Noted that pt had flash penetration with chin tuck. No penetration or aspiration with puree or regular solid consistencies. Pt demonstrated residuals in esophagus during AP esophageal sweep. VFSS results reviewed with pt and pts wife. Recommend pt continue regular diet (with self selection of easy to eat solids when fatigue is present) and thin liquids with chin tuck. No therapy indicated at this time, however, pt should continue to receive services from current OP SLP. Results will be shared with current SLP to help guide services. Pt should also continue to follow up with current GI since residual was noted during today s evaluation.   Total Session Time   SLP Eval: oral/pharyngeal swallow function, clinical minutes (76907) 15   SLP Eval: VideoFluoroscopic Swallow function Minutes (51870) 31   Total Evaluation Time 46     Direct supervision provided for PATRICK Farnsworth, throughout patient contact and documentation process.     Thank you for the referral of Esteban Casillas. If you have any questions about this report, please contact me using the information below.      Lavern Ahuja MS, CCC-SLP  Speech-Language Pathology  [facility]  Department of Otolaryngology/D&T - 4th floor  Phone: [phone]  Email: [email or web address]

## 2023-05-15 NOTE — TELEPHONE ENCOUNTER
Health Call Center    Phone Message    May a detailed message be left on voicemail: yes     Reason for Call: Other: Received an eform request for:  Progressive, still undiagnosed neurological neuromuscular disease that has had rapid progression and deterioration of speech, swallowing and now breathing. Not interested in life-prolonging measures and would like to discuss interventions that would improve quality of life. Primary care provider made referral but we have not been contacted to schedule an appointment yet. Please reach out to wife Fátima due to difficulty with verbal communication.     Please review and contact Fátima.   Action Taken: Message routed to:  [facility] ([facility] Neurology    Travel Screening: Not Applicable

## 2023-05-22 NOTE — LETTER
2023       RE: Esteban Casillas  [address] [place] [id number]       Dear Colleague,    Thank you for referring your patient, Esteban Casillas, to the [facility] EMG [facility]. Please see a copy of my visit note below.                          [facility]  Electrodiagnostic Laboratory                 Department of Neurology                                                                                                         Test Date:  2023    Patient: Esteban Casillas : 1970 Physician: Christiano Waters MD   Sex: Male AGE: 52 year Ref Phys: Mayur Ruiz MD   ID#: [id number]   Technician: Rose Dorado MD     History and Examination:  52 year old man who presents with progressive dysarthria and dysphagia since 2021.  At last neurology appointment , he reported stridor at nighttime.  He denies shortness of breath or orthopnea.  Over the past couple of months, he has developed clumsiness of his right hand and decreased muscle mass in right FDI.  EMG/NCS was requested to assess for focal neuropathy vs radiculopathy and to assess for interval changes compared to a study dated 22.    Results:  Nerve conduction studies:  1. Right median-D2, ulnar-D5, radial, sural, and superficial fibular sensory responses were normal.  2. Right tibial-AHB motor response showed normal distal latency, severely decreased amplitude, and moderately slowed conduction velocity.  3. Right fibular-EDB motor response showed normal distal latency, mildly decreased amplitude, and normal conduction velocity.  4. Left left tibial-AHB motor response showed normal distal latency and mildly decreased amplitude.  5. Left fibular-EDB, right median-APB, and right ulnar-ADM motor responses were normal.  6. Right median F waves were absent.    Needle EM. Fibrillation potentials and positive sharp waves were seen in the  right biceps, right pronator teres, right first dorsal interosseous, right thoracic paraspinals, right vastus lateralis, right tibialis anterior, and right gastrocnemius muscles.    2. Fasciculations were seen in the right deltoid, right biceps, right pronator teres, right triceps, right FDI, right thoracic paraspinal, right vastus lateralis, right tibialis anterior, and right gastrocnemius muscles.  3. Large amplitude and/or long duration motor unit potentials (MUP) were seen in the right deltoid, right triceps, and right vastus lateralis muscles.  MUPs with polyphasic morphology were seen in the right deltoid, right biceps, right pronator teres, right triceps, right FDI, and right tibialis anterior muscles.  4. Rapidly firing MUPs with reduced recruitment were seen in the right biceps, right deltoid, right pronator teres, right triceps, right FDI, right vastus lateralis, right tibialis anterior, and right gastrocnemius muscles.  5. Needle EMG of the tongue revealed probable fasciculation potentials and fibrillation potentials, but due to incomplete relaxation these could not be confirmed with certainty.     Interpretation:  This is an abnormal study.  There is electrophysiologic evidence of a generalized disorder of lower motor neurons affecting the cervical, thoracic, lumbosacral and probably bulbar segments. Compared to the study performed 7/26/22 there has been marked interval progression of the denervation changes. The paucity of chronic reinnervation changes relative to the widespread multisegment denervation changes suggests that this lower motor neuron disorder is rapid in progression. Clinical correlation is recommended.    Rsoe Dorado MD  CNP Fellow    I was present for all key portions of the NCS/EMG study. All data and waveforms personally reviewed. I agree with the results and interpretation as above.    Christiano Waters MD  Department of Neurology    Nerve Conduction Studies  Motor Sites      Latency  Amplitude Neg. Amp Diff Segment Distance Velocity Neg. Dur Neg Area Diff Temperature Comment   Site (ms) Norm (mV) Norm %  cm m/s Norm ms %  C    Left Dp Branch Fibular (TA) Motor   Fib Head 3.2  < 6.0 3.7 -      11.6  30.3    Pop Fossa 4.9  < 5.7 3.3 - -10.8 Pop Fossa-Fib Head 10 59 - 11.8 -4.9 30.3    Right Dp Branch Fibular (TA) Motor   Fib Head 4.5  < 6.0 2.7 -      12.3  31.1    Pop Fossa 6.5  < 5.7 2.6 - -3.7 Pop Fossa-Fib Head 12 60 - 12.7 -9.2 30.9    Left Fibular (EDB) Motor   Ankle 6.0  < 6.0 3.5  > 2.5  Ankle-EDB 8   6.6  30.3    Right Fibular (EDB) Motor   Ankle 5.7  < 6.0 2.2  > 2.5  Ankle-EDB 8   5.9  24.7    Bel Fib Head 14.3 - 2.2 - 0 Bel Fib Head-Ankle 34.5 40  > 38 6.5 0 31.5    Pop Fossa 16.6 - 2.2 - 0 Pop Fossa-Bel Fib Head 11.5 50  > 38 6.8 15.2 31.4    Right Median (APB) Motor   Wrist 4.3  < 4.4 6.3  > 5.0  Wrist-APB 8   4.7  30.5    Elbow 9.1 - 5.6  > 5.0 -11.1 Elbow-Wrist 26 54  > 48 5.5 -1.21 30.6    Arm 11.1 - 5.5  > 5.0 -1.79 Arm-Elbow 12 60 - 5.4 0.61 30.7    Left Tibial (AHB) Motor   Ankle 4.7  < 6.5 4.3  > 5.0  Ankle-AHB 8   7.6  30    Right Tibial (AHB) Motor   Ankle 2.1  < 6.5 1.02  > 5.0  Ankle-AHB 8   10.8  30    Knee 16.7 - 0.84 - -17.6 Knee-Ankle 47 32  > 38 - - 30.4    Right Ulnar (ADM) Motor   Wrist 3.1  < 3.5 6.3  > 5.0  Wrist-ADM 8   5.5  30.3    Bel Elbow 7.0 - 5.8 - -7.9 Bel Elbow-Wrist 21 54  > 48 5.9 -4.2 30.2    Abv Elbow 9.4 - 5.4 - -6.9 Abv Elbow-Bel Elbow 14 58  > 48 6.0 -4.4 30.2    Up Arm 11.0 - 5.2 - -3.7 Up Arm-Abv Elbow 10 63 - 5.8 -3.3 30.2      F-Wave Sites      Min F-Lat Max-Min F-Lat Mean F-Lat   Site (ms) ms ms   Right Fibular F-Wave   Ankle 60.4 0.90 -   Right Median F-Wave   Wrist NR NR NR   Right Tibial F-Wave   Ankle 57.7 - -   Right Ulnar F-Wave   Wrist 35.8 0.10 35.8     Sensory Sites      Onset Lat Peak Lat Amp (O-P) Amp (P-P) Segment Distance Velocity Temperature Comment   Site ms ms  V Norm  V  cm m/s Norm  C    Right Median Sensory   Wrist-Dig II  2.5 3.1 25  > 10 43 Wrist-Dig II 14 56  > 48 30.3    Right Radial Sensory   Forearm-Wrist 1.68 2.2 32  > 15 40 Forearm-Wrist 10 60 - 30.3    Right Superficial Fibular Sensory   14 cm-Ankle 3.6 4.6 9  > 3 9 14 cm-Ankle 14 39  > 38 30.4    Right Sural Sensory   Calf-Lat Mall 3.6 4.5 15  > 5 22 Calf-Lat Mall 14 39  > 38 30.7    Right Ulnar Sensory   Wrist-Dig V 2.2 2.9 18  > 8 42 Wrist-Dig V 12.5 57  > 48 30.3        Electromyography     Side Muscle Ins Act Fibs/PSW Fasc HF Amp Dur Poly Recrt Int Pat   Right Deltoid Nml None 1+ 0 Nml 1+ 2+ ModRed Nml   Right Biceps Incr 1+ 1+ 0 Nml Nml 1+ Zuly Nml   Right Pronator Teres Incr 2+ 2+ 0 Nml Nml 1+ Zuly Nml   Right Triceps Nml None 2+ 0 Nml 1+ 1+ Zuly Nml   Right FDI Incr 3+ 1+ 0 Nml Nml 2+ SevRed Nml   Right Tongue Probable Probable Probable 0        Right Vastus Lat Incr 2+ 1+ 0 1+ Nml 0 Zuly Nml   Right Tib Anterior Incr 3+ 1+ 0 Nml Nml 1+ Zuly Nml   Right Gastroc Incr 3+ 1+ 0 Nml Nml 0 Zuly Nml   Right Thoracic Paraspinals Incr 1+ 2+ 0        Note: right tongue needle exam with inadequate relaxation of tongue.      NCS Waveforms:    Motor                           F-Wave                Sensory                    Ultrasound Images:                Again, thank you for allowing me to participate in the care of your patient.      Sincerely,    Christiano Waters MD

## 2023-05-22 NOTE — PROGRESS NOTES
[facility]  Electrodiagnostic Laboratory                 Department of Neurology                                                                                                         Test Date:  2023    Patient: Esteban Casillas : 1970 Physician: Christiano Waters MD   Sex: Male AGE: 52 year Ref Phys: Mayur Ruiz MD   ID#: [id number]   Technician: Rose Dorado MD     History and Examination:  52 year old man who presents with progressive dysarthria and dysphagia since 2021.  At last neurology appointment , he reported stridor at nighttime.  He denies shortness of breath or orthopnea.  Over the past couple of months, he has developed clumsiness of his right hand and decreased muscle mass in right FDI.  EMG/NCS was requested to assess for focal neuropathy vs radiculopathy and to assess for interval changes compared to a study dated 22.    Results:  Nerve conduction studies:  1. Right median-D2, ulnar-D5, radial, sural, and superficial fibular sensory responses were normal.  2. Right tibial-AHB motor response showed normal distal latency, severely decreased amplitude, and moderately slowed conduction velocity.  3. Right fibular-EDB motor response showed normal distal latency, mildly decreased amplitude, and normal conduction velocity.  4. Left left tibial-AHB motor response showed normal distal latency and mildly decreased amplitude.  5. Left fibular-EDB, right median-APB, and right ulnar-ADM motor responses were normal.  6. Right median F waves were absent.    Needle EM. Fibrillation potentials and positive sharp waves were seen in the right biceps, right pronator teres, right first dorsal interosseous, right thoracic paraspinals, right vastus lateralis, right tibialis anterior, and right gastrocnemius muscles.    2. Fasciculations were seen in the right deltoid, right biceps, right pronator teres, right triceps, right FDI, right thoracic  paraspinal, right vastus lateralis, right tibialis anterior, and right gastrocnemius muscles.  3. Large amplitude and/or long duration motor unit potentials (MUP) were seen in the right deltoid, right triceps, and right vastus lateralis muscles.  MUPs with polyphasic morphology were seen in the right deltoid, right biceps, right pronator teres, right triceps, right FDI, and right tibialis anterior muscles.  4. Rapidly firing MUPs with reduced recruitment were seen in the right biceps, right deltoid, right pronator teres, right triceps, right FDI, right vastus lateralis, right tibialis anterior, and right gastrocnemius muscles.  5. Needle EMG of the tongue revealed probable fasciculation potentials and fibrillation potentials, but due to incomplete relaxation these could not be confirmed with certainty.     Interpretation:  This is an abnormal study.  There is electrophysiologic evidence of a generalized disorder of lower motor neurons affecting the cervical, thoracic, lumbosacral and probably bulbar segments. Compared to the study performed 7/26/22 there has been marked interval progression of the denervation changes. The paucity of chronic reinnervation changes relative to the widespread multisegment denervation changes suggests that this lower motor neuron disorder is rapid in progression. Clinical correlation is recommended.    Rose Dorado MD  CNP Fellow    I was present for all key portions of the NCS/EMG study. All data and waveforms personally reviewed. I agree with the results and interpretation as above.    Christiano Waters MD  Department of Neurology    Nerve Conduction Studies  Motor Sites      Latency Amplitude Neg. Amp Diff Segment Distance Velocity Neg. Dur Neg Area Diff Temperature Comment   Site (ms) Norm (mV) Norm %  cm m/s Norm ms %  C    Left Dp Branch Fibular (TA) Motor   Fib Head 3.2  < 6.0 3.7 -      11.6  30.3    Pop Fossa 4.9  < 5.7 3.3 - -10.8 Pop Fossa-Fib Head 10 59 - 11.8 -4.9 30.3    Right  Dp Branch Fibular (TA) Motor   Fib Head 4.5  < 6.0 2.7 -      12.3  31.1    Pop Fossa 6.5  < 5.7 2.6 - -3.7 Pop Fossa-Fib Head 12 60 - 12.7 -9.2 30.9    Left Fibular (EDB) Motor   Ankle 6.0  < 6.0 3.5  > 2.5  Ankle-EDB 8   6.6  30.3    Right Fibular (EDB) Motor   Ankle 5.7  < 6.0 2.2  > 2.5  Ankle-EDB 8   5.9  24.7    Bel Fib Head 14.3 - 2.2 - 0 Bel Fib Head-Ankle 34.5 40  > 38 6.5 0 31.5    Pop Fossa 16.6 - 2.2 - 0 Pop Fossa-Bel Fib Head 11.5 50  > 38 6.8 15.2 31.4    Right Median (APB) Motor   Wrist 4.3  < 4.4 6.3  > 5.0  Wrist-APB 8   4.7  30.5    Elbow 9.1 - 5.6  > 5.0 -11.1 Elbow-Wrist 26 54  > 48 5.5 -1.21 30.6    Arm 11.1 - 5.5  > 5.0 -1.79 Arm-Elbow 12 60 - 5.4 0.61 30.7    Left Tibial (AHB) Motor   Ankle 4.7  < 6.5 4.3  > 5.0  Ankle-AHB 8   7.6  30    Right Tibial (AHB) Motor   Ankle 2.1  < 6.5 1.02  > 5.0  Ankle-AHB 8   10.8  30    Knee 16.7 - 0.84 - -17.6 Knee-Ankle 47 32  > 38 - - 30.4    Right Ulnar (ADM) Motor   Wrist 3.1  < 3.5 6.3  > 5.0  Wrist-ADM 8   5.5  30.3    Bel Elbow 7.0 - 5.8 - -7.9 Bel Elbow-Wrist 21 54  > 48 5.9 -4.2 30.2    Abv Elbow 9.4 - 5.4 - -6.9 Abv Elbow-Bel Elbow 14 58  > 48 6.0 -4.4 30.2    Up Arm 11.0 - 5.2 - -3.7 Up Arm-Abv Elbow 10 63 - 5.8 -3.3 30.2      F-Wave Sites      Min F-Lat Max-Min F-Lat Mean F-Lat   Site (ms) ms ms   Right Fibular F-Wave   Ankle 60.4 0.90 -   Right Median F-Wave   Wrist NR NR NR   Right Tibial F-Wave   Ankle 57.7 - -   Right Ulnar F-Wave   Wrist 35.8 0.10 35.8     Sensory Sites      Onset Lat Peak Lat Amp (O-P) Amp (P-P) Segment Distance Velocity Temperature Comment   Site ms ms  V Norm  V  cm m/s Norm  C    Right Median Sensory   Wrist-Dig II 2.5 3.1 25  > 10 43 Wrist-Dig II 14 56  > 48 30.3    Right Radial Sensory   Forearm-Wrist 1.68 2.2 32  > 15 40 Forearm-Wrist 10 60 - 30.3    Right Superficial Fibular Sensory   14 cm-Ankle 3.6 4.6 9  > 3 9 14 cm-Ankle 14 39  > 38 30.4    Right Sural Sensory   Calf-Lat Mall 3.6 4.5 15  > 5 22 Calf-Lat Mall 14 39   > 38 30.7    Right Ulnar Sensory   Wrist-Dig V 2.2 2.9 18  > 8 42 Wrist-Dig V 12.5 57  > 48 30.3        Electromyography     Side Muscle Ins Act Fibs/PSW Fasc HF Amp Dur Poly Recrt Int Pat   Right Deltoid Nml None 1+ 0 Nml 1+ 2+ ModRed Nml   Right Biceps Incr 1+ 1+ 0 Nml Nml 1+ Zuly Nml   Right Pronator Teres Incr 2+ 2+ 0 Nml Nml 1+ Zuly Nml   Right Triceps Nml None 2+ 0 Nml 1+ 1+ Zuly Nml   Right FDI Incr 3+ 1+ 0 Nml Nml 2+ SevRed Nml   Right Tongue Probable Probable Probable 0        Right Vastus Lat Incr 2+ 1+ 0 1+ Nml 0 Zuly Nml   Right Tib Anterior Incr 3+ 1+ 0 Nml Nml 1+ Zuly Nml   Right Gastroc Incr 3+ 1+ 0 Nml Nml 0 Zuly Nml   Right Thoracic Paraspinals Incr 1+ 2+ 0        Note: right tongue needle exam with inadequate relaxation of tongue.      NCS Waveforms:    Motor                           F-Wave                Sensory                    Ultrasound Images:

## 2023-06-01 PROBLEM — G12.21 ALS (AMYOTROPHIC LATERAL SCLEROSIS) (H): Status: ACTIVE | Noted: 2023-01-01

## 2023-06-01 NOTE — PROGRESS NOTES
Jerrica Ruiz MD ([facility] Neurology)    Oral, June 1, 2023    Dear Jerrica,    I had the pleasure to see Esteban Casillas at the [facility] today.  He was escorted by his wife, who is a nurse at [employer] and has worked with home care in the past.  He is a 52-year-old man who first noted a hoarse voice and dysarthria about 2 years ago in the summer 2021.  The situation gradually deteriorated in the next 2 years.  He is now only partially intelligible when talking and has to use an iPad or his cell phone to communicate at times.  He is having frequent coughing episodes.  He denies accumulation of thick phlegm.  In the last 6 months or so, he is having more sialorrhea to the point he needs to hold a napkin with him.  This can get uncomfortable at times.  He denies significant pseudobulbar affect and he has no head drop, ptosis, or diplopia.  He also has developed dysphagia in the last 2 years,  worse in the last few months.  He had a video swallow test one month ago showing deep penetration.  He was advised by speech therapist to do the chin tuck maneuver which definitely helps.  If he does not do chin tuck maneuver, he will have coughing episodes and choking even with liquids.  He is avoiding hard food and is using softer ones only, as his jaw will fatigue when chewing.  He has lost about 6 pounds of weight all in the last month.  Additionally, in the last year he has noted some orthopnea. He cannot tolerate the flat position due to dyspnea and has to sleep with extra pillows.  His sleep is restless.  He denies morning headaches.  He does not use any BiPAP machine. He did have pulmonary function test done on April 24, 2023 at [facility] and FVC was 90% and FEV1 was 81%.  Unfortunately MIP and MEP were not obtained.    For the past 4 to 5 months, he reports increasing clumsiness of the right hand when doing fine hand movements, such as picking  "objects from his pocket, trying to open jars or bottles, using a pen, etc.  He has no difficulties cutting his food or brushing his teeth.  He denies difficulty abducting his arm.  He has not noticed similar problems with the left hand.  He reports loss of muscle bulk at his right hand muscles.  He has also observed some twitching over the right triceps, and occasionally the legs.  His wife states that the twitching has been present for a good year, if not longer.  Lastly, he has observed some difficulty getting up from low seated chairs, and need to use his arms in the last 3 to 4 months.  His balance is more unstable than it used to be, although he has not fallen.  He has upcoming appointment with OT and PT at [facility] in the next month.  He already saw speech therapy.    His wife also reports some subtle cognitive changes in the last few months.  We did not discuss this further.      The patient has a family history of frontotemporal dementia that affected his father, who was diagnosed at age 70 and  at 74 with this diagnosis.  Father did not have difficulty swallowing or talking or obvious limb weakness.    Mr Trinh does not smoke.  He drinks alcoholic drinks 1-2 a day, usually beer or whiskey.  Denies illicit drug use.    Past medical history significant for GERD, and seasonal allergies.  He is allergic to Bactrim which caused severe hives.    Medications are as per epic record.    BP (!) 143/89 (BP Location: Right arm, Patient Position: Sitting, Cuff Size: Adult Large)   Pulse 86   Ht 1.88 m (6' 2\")   Wt 106.2 kg (234 lb 3.2 oz)   SpO2 96%   BMI 30.07 kg/m       MIP done today was -34 cm H2O indicating diaphragmatic weakness.    EXAM: He is awake, alert, oriented x3.  He is able to provide a coherent history.  Cranial nerve examination shows no ptosis or ophthalmoparesis.  Ductions and versions of the eyes are normal.  There is no diplopia in any gaze position.  There is very mild weakness of the " right orbicularis oculi, none on the left.  There is mild weakness of right orbicularis oris as well.  He is unable to do a cheek puff at all, and I suspect there is an element of apraxia to it.  His jaw opening and closure is probably normal.  Speech is very spastic and only partially intelligible.  I did not detect any ataxic component to the speech.  Lateral tongue movements are very slow.  Tongue bulk is equivocally reduced, but there are definite fasciculations especially posteriorly.  He has a brisk jaw jerk.  His uvula and palate elevate at midline.  His neck flexion strength is 5 -/5, and neck extension is similar.  Shoulder shrug is normal.  Facial sensation is intact in all divisions of trigeminal nerve bilaterally.    Motor examination shows normal strength for bilateral deltoids, biceps, triceps, and wrist extensors.  Finger extensors are 4 bilaterally, and so are APB muscles.  FDI is 3 on the right and 4 - on the left.  There is intrinsic hand muscle atrophy on both sides, mostly in the right.  I show some fasciculations of the right triceps, but otherwise they were scant or absent.  Reflexes were very brisk 3+ in biceps, triceps, and brachioradialis bilaterally, slightly brisker on the left.  He has positive bilateral pectoralis and Chevy reflexes.  Agility of finger tapping is mildly reduced on the right, probably normal on the left.  There is no obvious spasticity in the upper extremities.    At the lower extremities, his strength is 5/5 for bilateral hip flexion, knee extension, knee flexion.  Foot dorsiflexion is 4+ on the left and 4 on the right.  Agility of foot tapping is reduced on both sides.  Tone is mildly increased on the right leg, normal on the left.  Reflexes are very brisk at the knees 3+ with crossed adductor response bilaterally.  Ankle jerks are 2+ and symmetric.  Plantar responses are extensor on the right, and equivocal on the left.  Sensory exam is intact to vibration, joint  position, and light touch at the toes and index fingers.  Finger-to-nose is done without dysmetria.  There is no obvious ataxia.  He can get up from a chair with arms crossed on the chest without significant difficulties.    He had two EMG studies in 2022, one at [facility], and one at the [facility], which both failed to show any definite signs of lower motor neuron dysfunction.  However, a third EMG study done a month ago by Dr. Waters at the [facility] showed clear denervation of the tongue, upper extremities, thoracic paraspinals, and lower extremities, without motor conduction block.  Those results strongly suggest motor neuron disease.    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  I explained to him why the diagnosis was difficult initially, when EMG was negative.     We discussed a number of practical issues.  I offered him medications that can slow down progression of ALS, including riluzole, Radicava, and Relyvrio.  He is interested in taking them.  I went over the side effects of the medications.  We will start with riluzole, and then gradually introduce the other medications over the next 4 to 6 weeks.  Liver function test will be monitored monthly for the first 3 months of therapy.    The patient has already addressed his advanced directives and he is DNR/DNI.  He has completed the POLST form and it is already in his chart.  He was forthright that he is not interested in gastrostomy placement either.    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 Pulmonary function tests done today showed a MIP value of -34 cm H2O which is low and indicative of weak diaphragm. Based on Medicare criteria, all ALS patients who have respiratory symptoms like  orthopnea, and MIP values of less than 50 cm H2O, are eligible for noninvasive ventilation (BiPAP or AVAPs) at night. NIV in my opinion is medically necessary for Mr Casillas at this stage, as it can improve his quality of sleep, daytime energy, and prolong his survival. I attest to the necessity after today's face to face visit.     He does have excessive sialorrhea and I will offer him glycopyrrolate 1 mg 3 times daily as needed.  He does not have pseudobulbar affect.    He will meet our speech therapist in clinic today, and he will also talk to her occupational therapist about his hand weakness.  He has lost 6 pounds of weight in the last month and this requires attention; for that reason, I will request an evaluation by our RD as well.    He is not interested in ALS clinical trials. I discussed the genetics of ALS at some length with him, and I would strongly encourage genetic testing for common ALS mutations, including [id number] repeat expansion, especially given his positive family history of frontotemporal dementia.  For that reason, it would be valuable to obtain a baseline cognitive screening test such as CBS today.  He agrees with this plan.    I will see him in follow-up in clinic in 3 months, sooner if needed.  All of his questions were answered to the best I could.    Sincerely,    Total time spent on this encounter today 70- minutes of which 45 face-to-face with patient, 15 in postvisit note dictation, editing, and orders, and 10 in previsit chart review.      Guevara Carrasco MD, FAAN    ADDENDUM #2: I attest that I have seen and evaluated Esteban Casillas on 6/1/2023. He has a chronic diagnosis of ALS with dysphagia. I have seen the patient face to face to address dysphagia and inability to clear/mobilize secretions. It is medically necessary for the patient to have a home oral suction device and supplies. Guevara Carrasco MD, FAAN

## 2023-06-01 NOTE — PROGRESS NOTES
OCCUPATIONAL THERAPY EVALUATION  Type of Visit: Evaluation    See electronic medical record for Abuse and Falls Screening details.    Subjective      Presenting condition or subjective complaint:  ALS; R hand is weaker, buttoning, holding utensils to grill difficult, using Left hand now. SOB with minimal activity per wife and pt  Date of onset:   6/1/23   Relevant medical history:   Pt has onset of spastic dysarthria and dysphagia 12/2021, respiratory changes and reduced R hand strength and wt loss more recently; family hx of dementia   Dates & types of surgery:  R knee injury; s/p surgery    Prior diagnostic imaging/testing results:     see chart  Prior therapy history for the same diagnosis, illness or injury:    OT for R knee inpatient in past    Prior Level of Function   Transfers: Independent  Ambulation: Independent  ADL: Independent  IADL: Childcare, Driving, Finances, Housekeeping, Laundry, Meal preparation, Medication management, Work, Yard work    Living Environment  Social support:   lives with wifeLupe, and 2 kids and 13 and 15 girl and boy  Type of home:   house[place], 1 1/2 story with basement  Stairs to enter the home:       3 steps in with rail  Ramp:   no  Bathroom set up: toilet higher in basement and walk-in shower; main level tub shower and low toilet  Stairs inside the home:       full flight with basement  Help at home:  Lupe works [employer] and has some FMLA set up,   Equipment owned:   none    Employment:    sales on disability now, some   Hobbies/Interests:  [employer] season tickets    Patient goals for therapy:  hand weaker and has to grill with left now    Pain assessment: Pain denied     Objective      NEURO CLINIC EVALUATION    Others present at visit: Spouse / significant other, Ruth    Cardio-respiratory status: BiPAP  Forced vital capacity: 88 % of predicted MIP-34    Height/Weight: Data Unavailable / 0 lbs 0 oz    Technology used: smartphone    ALS FRS:   NT/48    Evaluation   Interview completed.     Range of motion:  UE AROM is full    Manual muscle testing: notable R lateral and palmar pinch weakness compared to L  R hand dominant    Cognition: ALS CBS (ALS Cognitive Behavioral Screen) completed today:    Total Cognitive score (assessed by clinician): 7/20    *Cognitive scores ranging from 17-20 do not support the presence of clear cognitive impairment  *Scores below 16 raise suspicion of cognitive impairment with this suspicion increasing significantly for scores falling below 12 suggesting need for further evaluation  *Scores below 10 raise considerable suspicion for ALS-FTD or other dementia and suggests need for comprehensive evaluation    Cognitive screening comments/detail:  See synopsis    Total Behavioral Score (completed by patient's caregiver):  16/45   ALS Caregiver Behavioral Questionnaire, completed by spouse    *Behavioral score of 36 or less suspicious of behavioral impairment that may be endorsed on comprehensive evaluation    *Behavioral score equal to or less than 32 suspicious of FTD (Frontotemporal dementia) and suggests need for comprehensive evaluation    Behavioral screening comments/details: Large changes in behavior noted x 5, medium x 6 items, small change x2 items and no change on 2       ADL:   Feeding self:  Ind  Grooming: Ind  UE Dressing:  Small buttons on wrist cuff  LE Dressing:  Tying shoes harder  Showering/bathing: Ind  Toileting/transfer:  Ind  Functional Mobility: Ind    IADL:   Home management:  Cleans, dishes, laundry,wife does other  Driving:  Drives , some navigating challenges to unfamiliar places per pt's wife, still driving to [place] and to meet friends for lunch  Occupation: disabled  Leisure: [employer]  Emergency Call system:smartphone       Recommended Interventions: self care/home management    Treatment provided this date:   Self care/home management, 15 minutes  Results of cognitive screening and  functional impact/concern with IADL and also purpose of a functional assessment with CPT as pt anxious about test questions. Pt declines further cognitive assessment on suggestion today but open to CPT where pt will be doing functional tasks to assess cognition.  Custom Thumb splint for R hand recommended and demonstrated to pt to help with pinch force for tasks as with simulated support of R thumb by therapist he had increased strength, recommend referral to hand therapy  Trained pt in option of button hook, zipper pulls, built up foam for utensils and 5 in 1 opener. Last item requested from ALSA program. Pt declined other equipment  Energy management training with important principles of resting often, with full body supported and respecting signs of fatigue and dyspnea     Response to treatment/recommendations: receptive.pt with reduced insight to his impairments. Wife has good awareness    Goal attainment:  All goals met    Total Evaluation Time (Minutes): 40   Timed Code Treatment Minutes: 15  Total Treatment Time (sum of timed and untimed services): 55 min    Assessment & Plan   CLINICAL IMPRESSIONS   Medical Diagnosis:   ALS   Treatment Diagnosis:   Impaired ADL/IADL   Impression/Assessment: Pt is a 52 year old male presenting to Occupational Therapy due to ALS.  The following significant findings have been identified: Impaired activity tolerance, Impaired balance, Impaired cognition, Impaired communication, Impaired coordination, Impaired strength and fatigue with respiratory compromise.  These identified deficits interfere with their ability to perform work tasks, recreational activities, household chores, driving , community mobility, medication management, financial management,  yard work, care of others, meal planning and preparation and community or volunteer activities as compared to previous level of function.     Clinical Decision Making (Complexity):   Assessment of Occupational Performance: 3-5  Performance Deficits  Occupational Performance Limitations: driving and community mobility, health management and maintenance, home establishment and management, meal preparation and cleanup, shopping, work, volunteer activities and child rearing.  Clinical Decision Making (Complexity): Moderate complexity    PLAN OF CARE  Treatment Interventions:   Interventions: Self-Care/Home Management    Long Term Goals    Pt and spouse will demonstrate understanding of at least 2 energy management techniques to aid ADL/IADL tasks.  Pt and spouse will verbalize understanding of functional implications of cognitive screening on IADL and plan for further assessment  Pt will demonstrate understanding of AE to aid R hand function with ADL/IADL      Frequency of Treatment:  1 time only  Duration of Treatment:       Recommended Referrals to Other Professionals: Occupational Therapy-outpatient for CPT  Education Assessment:  pt and wife receptive     Risks and benefits of evaluation/treatment have been explained.   Patient/Family/caregiver agrees with Plan of Care.               Signing Clinician: Mika Carvajal OT

## 2023-06-01 NOTE — LETTER
6/1/2023       RE: Esteban Casillas  [address] [place] [id number]     Dear Colleague,    Thank you for referring your patient, Esteban Casillas, to the [facility] [place] [facility]. Please see a copy of my visit note below.    Jerrica Ruiz MD ([facility])    [place], June 1, 2023    Dear Jerrica,    I had the pleasure to see Esteban Casillas at the [facility] today.  He was escorted by his wife, who is a nurse at [employer] and has worked with home care in the past.  He is a 52-year-old man who first noted a hoarse voice and dysarthria about 2 years ago in the summer 2021.  The situation gradually deteriorated in the next 2 years.  He is now only partially intelligible when talking and has to use an iPad or his cell phone to communicate at times.  He is having frequent coughing episodes.  He denies accumulation of thick phlegm.  In the last 6 months or so, he is having more sialorrhea to the point he needs to hold a napkin with him.  This can get uncomfortable at times.  He denies significant pseudobulbar affect and he has no head drop, ptosis, or diplopia.  He also has developed dysphagia in the last 2 years,  worse in the last few months.  He had a video swallow test one month ago showing deep penetration.  He was advised by speech therapist to do the chin tuck maneuver which definitely helps.  If he does not do chin tuck maneuver, he will have coughing episodes and choking even with liquids.  He is avoiding hard food and is using softer ones only, as his jaw will fatigue when chewing.  He has lost about 6 pounds of weight all in the last month.  Additionally, in the last year he has noted some orthopnea. He cannot tolerate the flat position due to dyspnea and has to sleep with extra pillows.  His sleep is restless.  He denies morning  "headaches.  He does not use any BiPAP machine. He did have pulmonary function test done on 2023 at [facility] and FVC was 90% and FEV1 was 81%.  Unfortunately MIP and MEP were not obtained.    For the past 4 to 5 months, he reports increasing clumsiness of the right hand when doing fine hand movements, such as picking objects from his pocket, trying to open jars or bottles, using a pen, etc.  He has no difficulties cutting his food or brushing his teeth.  He denies difficulty abducting his arm.  He has not noticed similar problems with the left hand.  He reports loss of muscle bulk at his right hand muscles.  He has also observed some twitching over the right triceps, and occasionally the legs.  His wife states that the twitching has been present for a good year, if not longer.  Lastly, he has observed some difficulty getting up from low seated chairs, and need to use his arms in the last 3 to 4 months.  His balance is more unstable than it used to be, although he has not fallen.  He has upcoming appointment with OT and PT at [facility] in the next month.  He already saw speech therapy.    His wife also reports some subtle cognitive changes in the last few months.  We did not discuss this further.      The patient has a family history of frontotemporal dementia that affected his father, who was diagnosed at age 70 and  at 74 with this diagnosis.  Father did not have difficulty swallowing or talking or obvious limb weakness.    Mr Trinh does not smoke.  He drinks alcoholic drinks 1-2 a day, usually beer or whiskey.  Denies illicit drug use.    Past medical history significant for GERD, and seasonal allergies.  He is allergic to Bactrim which caused severe hives.    Medications are as per epic record.    BP (!) 143/89 (BP Location: Right arm, Patient Position: Sitting, Cuff Size: Adult Large)   Pulse 86   Ht 1.88 m (6' 2\")   Wt 106.2 kg (234 lb 3.2 oz)   SpO2 96%   BMI 30.07 kg/m       MIP done " today was -34 cm H2O indicating diaphragmatic weakness.    EXAM: He is awake, alert, oriented x3.  He is able to provide a coherent history.  Cranial nerve examination shows no ptosis or ophthalmoparesis.  Ductions and versions of the eyes are normal.  There is no diplopia in any gaze position.  There is very mild weakness of the right orbicularis oculi, none on the left.  There is mild weakness of right orbicularis oris as well.  He is unable to do a cheek puff at all, and I suspect there is an element of apraxia to it.  His jaw opening and closure is probably normal.  Speech is very spastic and only partially intelligible.  I did not detect any ataxic component to the speech.  Lateral tongue movements are very slow.  Tongue bulk is equivocally reduced, but there are definite fasciculations especially posteriorly.  He has a brisk jaw jerk.  His uvula and palate elevate at midline.  His neck flexion strength is 5 -/5, and neck extension is similar.  Shoulder shrug is normal.  Facial sensation is intact in all divisions of trigeminal nerve bilaterally.    Motor examination shows normal strength for bilateral deltoids, biceps, triceps, and wrist extensors.  Finger extensors are 4 bilaterally, and so are APB muscles.  FDI is 3 on the right and 4 - on the left.  There is intrinsic hand muscle atrophy on both sides, mostly in the right.  I show some fasciculations of the right triceps, but otherwise they were scant or absent.  Reflexes were very brisk 3+ in biceps, triceps, and brachioradialis bilaterally, slightly brisker on the left.  He has positive bilateral pectoralis and Chevy reflexes.  Agility of finger tapping is mildly reduced on the right, probably normal on the left.  There is no obvious spasticity in the upper extremities.    At the lower extremities, his strength is 5/5 for bilateral hip flexion, knee extension, knee flexion.  Foot dorsiflexion is 4+ on the left and 4 on the right.  Agility of foot  tapping is reduced on both sides.  Tone is mildly increased on the right leg, normal on the left.  Reflexes are very brisk at the knees 3+ with crossed adductor response bilaterally.  Ankle jerks are 2+ and symmetric.  Plantar responses are extensor on the right, and equivocal on the left.  Sensory exam is intact to vibration, joint position, and light touch at the toes and index fingers.  Finger-to-nose is done without dysmetria.  There is no obvious ataxia.  He can get up from a chair with arms crossed on the chest without significant difficulties.    He had two EMG studies in 2022, one at [facility], and one at the [facility], which both failed to show any definite signs of lower motor neuron dysfunction.  However, a third EMG study done a month ago by Dr. Waters at the [facility] showed clear denervation of the tongue, upper extremities, thoracic paraspinals, and lower extremities, without motor conduction block.  Those results strongly suggest motor neuron disease.    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  I explained to him why the diagnosis was difficult initially, when EMG was negative.     We discussed a number of practical issues.  I offered him medications that can slow down progression of ALS, including riluzole, Radicava, and Relyvrio.  He is interested in taking them.  I went over the side effects of the medications.  We will start with riluzole, and then gradually introduce the other medications over the next 4 to 6 weeks.  Liver function test will be monitored monthly for the first 3 months of therapy.    The patient has already addressed his advanced directives and he is DNR/DNI.  He has completed the POLST form and it is already in his chart.  He was forthright that he is not interested in  gastrostomy placement either.    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 Pulmonary function tests done today showed a MIP value of -34 cm H2O which is low and indicative of weak diaphragm. Based on Medicare criteria, all ALS patients who have respiratory symptoms like orthopnea, and MIP values of less than 50 cm H2O, are eligible for noninvasive ventilation (BiPAP or AVAPs) at night. NIV in my opinion is medically necessary for Mr Casillas at this stage, as it can improve his quality of sleep, daytime energy, and prolong his survival. I attest to the necessity after today's face to face visit.     He does have excessive sialorrhea and I will offer him glycopyrrolate 1 mg 3 times daily as needed.  He does not have pseudobulbar affect.    He will meet our speech therapist in clinic today, and he will also talk to her occupational therapist about his hand weakness.  He has lost 6 pounds of weight in the last month and this requires attention; for that reason, I will request an evaluation by our RD as well.    He is not interested in ALS clinical trials. I discussed the genetics of ALS at some length with him, and I would strongly encourage genetic testing for common ALS mutations, including [id number] repeat expansion, especially given his positive family history of frontotemporal dementia.  For that reason, it would be valuable to obtain a baseline cognitive screening test such as CBS today.  He agrees with this plan.    I will see him in follow-up in clinic in 3 months, sooner if needed.  All of his questions were answered to the best I could.    Sincerely,    Total time spent on this encounter today 70- minutes of which 45 face-to-face with patient, 15 in postvisit note dictation, editing, and orders, and 10 in previsit chart review.    Guevara Carrasco MD, FAAN

## 2023-06-01 NOTE — PROGRESS NOTES
"SPEECH LANGUAGE PATHOLOGY EVALUATION    See electronic medical record for Abuse and Falls Screening details.    Subjective      Presenting condition or subjective complaint:  ALS, both he and wife Nicolas are pleasant and cooperative despite confirmation of ALS diagnosis today.  Date of onset:    Onset of dysarthria Fall 2021 per MD, previously given dx of spinocerebellar ataxia but recent EMG and MD vo today confirm \"clinically definite ALS\"  Relevant medical history:   GERD with esophageal stricture resulting in dilation  Prior therapy history for the same diagnosis, illness or injury:    VFSS 5/9/23 found mild-moderate oral pharyngeal dysphagia and recommended regular solids and thin liquids with strict use of chin tuck. He was also being seen by oupatient SLP for dysphagia and dysarthria but under previous diagnosis of ataxia. He is seen today with new dx of ALS.  Help at home:  he lives with his wife Nicolas who is a nurse at [employer]  Equipment owned:   using basic [id number]-um-[id number] zara on his iPhone  Patient goals for therapy:  interested in information regarding new dx ALS as related to speech and swallowing  Pain assessment: Pain denied     Objective   Others at Clinic Visit: Spouse / significant other Nicolas  Patient Concerns/Goals: Increased swallowing difficulty, Increased speech deficits, Augmentative / Alternative communication needs    Cardio-Respiratory Status: Forced vital capacity: 88 % of predicted   Height/Weight: 6'2 / 234lbs  Functional Rating Scale (ALS-FRS):  not yet assessed today  10 meter walk test: defer to PT  Speaking Rate: 63 words per minute  Technology used: using basic dilp-ca-culzkl zara on his iPhone      Oral Motor/Swallowing  Current Diet/Method of Nutritional Intake: oral diet, thin liquids (level 0), minced & moist (level 5), soft & bite-sized (level 6)    Level of assist required for feeding: no assistance needed  Oral Motor Function: Anomalies present:  Labial anomaly- ROM " "(mild-moderate), Strength (moderate), Rate (moderate)  Lingual anomaly- ROM (mild-moderate), Strength (mild-moderate), Rate (moderate)  Velar elevation- nasal air emission, slight hypernasality  Buccal strength- reduced expansion, difficult to elicit    Swallow Function: Thin Liquid-  reduced bolus control and propulsion, reduced pharyngeal constriction, reduced laryngeal elevation but no overt s/s aspiration observed  Solid-  reduced bolus control and propulsion, reduced pharyngeal constriction, reduced laryngeal elevation but no overt s/s aspiration observed     Dysphagia Diagnosis: Mild-moderate dysphagia  Diet Texture Recommendations: oral diet, thin liquids (level 0), minced & moist (level 5), continue to consider use of thickener  Recommended Feeding/Eating Techniques: supervision needed, small bolus size, slow rate of intake, chin tuck, alternate food and liquid intake, discontinue eating activities if fatigue is present, monitor for cough or change in vocal quality with intake  Medication Administration Recommendations: continue pills in pureeds  Instrumental Assessment Recommendations: instrumental evaluation not recommended at this time (VFSS just completed 5/9/23)    Dysphagia Intervention: SLP educated in swallowing anatomy and physiology including aspiration and possible respiratory compromise from aspiration. Trained safe swallow strategies to reduce aspiration risks listed above. Educated in role of oral cares in respiratory health with increased aspiration risks, he and wife report he is diligent about brushing after every meal and flossing every night. Also trained diet modifications to reduce risk of aspiration and ease intake however he is resistant to initiate thickened liquids, he already modifies foods including choosing softer foods and \"using a lot of gravy\". Encouraged naturally thicker liquids and close monitoring for his enjoyment of thin liquids with coughing persists (wife currently " "reports \"he coughs every day, multiples meals per day and his cough is getting weaker\"). He told MD he will not pursue a feeding tube. SLP confirmed this desire during visit and he seems confident in his decision despite SLP explanations of risks. He verbalized understanding of aspiration risks given dysphagia and progressive nature of ALS. He denies laryngospasms.      Speech Intelligibility/Functional Communication   Methods of communication: Verbal    Speech Intelligibility:      Word level speech intelligibility: minimal impairment, moderate impairment     Phrase/sentence level speech intelligibility: moderate impairment      Conversation level speech intelligibility: moderate impairment   Respiration Observations: WNL  Phonation: excessive loudness, strained-strangled quality, dysphonia  Resonance: hypernasal  Articulation: imprecise articulation, slow effortful rate    Motor speech level of impairment: moderate impairment, moderate to severe impairment  Speech Intelligibility/Communication: Impacts daily activities, Impacts social activities and Impacts phone usage  Augmentative and Alternative Communication (AAC): Currently has basic zara on iPhone, will request Speech Assistant from [facility]. he does not wish to pursue voice banking.    Communication Intervention: SLP trained use of speech strategies to improve intelligibility and reduce fatigue with speaking (reducing background noise, facing the conversation partner, speaking slowly, exaggerating each sound, repeating words or rewording message, using as few words as possible, planning ahead to reduce impact of fatigue). SLP also trained conversational partners present today regarding listener strategies (giving speaker full attention, letting the speaker know if message not understood, repeating the part of the message that was understood so speaker only needs to repeat the part that was missed, asking yes/no questions when able). SLP further educated " "regarding Augmentative-Alternative Communication and demonstrated use of iPad with AAC zara. He is currently using a basic tuyd-pe-uwdvww zara on his iPhone. SLP demonstrated \"Predictable\" zara which led to conversation regarding Voice Banking (personalized voice would be available on this zara). He and wife report they are aware of this option but do not feel he is appropriate given the severity of his dysarthria, SLP agrees. SLP did education in option for Message Banking and will request further information from \[facility]\"" SLP. Given that voice banking will not be pursued recommend the more user friendly AAC zara \"Speech Assistant AAC\" zara and will request this zara be gifted to him from \[facility]\"". He has his own iPad which is linked to his Apple ID so the zara will be available on both devices. Recommended virtual follow up with \[facility]\"" SLP but he has appointment next week with his outpatient SLP so will attempt to coordinate to see if she can provide training in person. SLP also educated in possible benefit of voice amplification device given his excessive loudness and wife report that he is actually easier to understand when he whispers, request made from \[facility]\"".      Assessment & Plan   CLINICAL IMPRESSIONS   Medical Diagnosis:     ALS  Treatment Diagnosis:   Dysarthria, Dysphagia  Impression/Assessment: Pt is a 52 year old male with new diagnosis of ALS (confirmed today). The following significant findings have been identified: impaired speech intelligibility and impaired swallowing, characterized by moderate to moderate-severe dysarthria and mild-moderate dysphagia. Identified deficits interfere with their ability to communicate wants and needs, consume an oral diet and maintain nutrition as compared to previous level of function.     Recommendations:   Oral diet.  Soft, moist solid with no particulate.  Continue use of compensatory strategies.  Continue compensatory speech strategies.  Initiate use of AAC device.    Education " provided/response:   Speech  Swallowing  AAC  Diet  Feeding tube  Verbalized understanding    Treatment provided this date:   Swallow intervention, 13 minutes  Communication intervention, 18 minutes    Response to recommendations/treatment: verbalized understanding, agreed to recommendations    Goal attainment: All goals met    Total Evaluation Time (minutes): 47 minutes  Timed Code Treatment (minutes): 0  Total Treatment Time (sum of timed and untimed services): 31    PLAN OF CARE  Treatment Interventions:  Swallowing dysfunction and/or oral function for feeding, Speech, Communication    Prognosis to achieve stated therapy goals is good   Rehab potential is impacted by: comorbidities    Long Term Goals  Based on today's evaluation session patient and/or caregiver will have understanding of current communication and/or swallowing status and recommendations for management.         Frequency of Treatment:  one time eval and treat  Duration of Treatment:   one time eval and treat    Recommended Referrals to Other Professionals: Speech Language Pathology    Risks and benefits of evaluation/treatment have been explained.   Patient/Family/caregiver agrees with Plan of Care.     Evaluation Time:    47 minutes        Present: No: not indicated     Signing Clinician: Shiloh Fisher, SLP

## 2023-06-01 NOTE — PATIENT INSTRUCTIONS
We will start a medication called riluzole for ALS.  I will ask Josefa to have you fill the required forms to obtain authorization from your insurance for the other 2 ALS drugs (Relyvrio and Radicava).  We will start them later.    You should get blood test for your liver monthly for the first 3 months of treatment with those drugs.    You will see our speech therapist, dietitian, and occupational therapist today.    I would encourage you to talk to our genetic counselor about genetic testing for ALS, as your family history of frontotemporal dementia is definitely relevant, and frontotemporal dementia is associated with ALS.    I will offer you medication called Robinul for the excessive saliva.  You can take it 1-3 times a day as needed.  Taking too much can make the mouth too dry.    Follow-up in 3 months, sooner if needed      Please call Josefa @ [phone] for questions or concerns during regular business hours. For a more efficient way to communicate, use [employer] and address the message to your physician. Remember, [employer] is only read during business hours. Do not leave urgent messages on [employer] or [employer]. If situation is urgent, contact the Neurology Clinic @ [phone] and ask to speak to a Triage Nurse or Call 911 or visit an Emergency Department.    Please call your pharmacy if you need a medication refill. They will send us an electronic message.

## 2023-06-01 NOTE — PROGRESS NOTES
"CLINICAL NUTRITION SERVICES - ASSESSMENT NOTE    Esteban Casillas 52 year old referred for MNT related to ALS    Visit Type: Initial  Referring Physician: Dr. Carrasco  Pt accompanied by: wife    NUTRITION HISTORY  Factors affecting nutrition intake include:swallowing difficulties and eating fatigue  Current diet: Regular, thin liquids  Current appetite/intake: Good, intake has decreased some.  PEG Tube: N/A    Diet Recall  Breakfast Quiche, smoothie - banana, almond milk, protein powder, strawberries   Lunch Leftovers   Dinner Meat, potatoes, cooked veggies - has sauce on everything   Snacks Veggie straws, grapes, lemon mousse, yogurt   Beverages Water, coffee   Alcohol Scotch, beer     ANTHROPOMETRICS  Height: 1.88 m (6' 2\")    Weight: 106.2 kg (234 lb 3.2 oz)    BMI: 30.07 kg/m    Weight Status:  Obesity Grade I BMI 30-34.9  IBW: 86.4 kg  % IBW: 123  Weight History: 9# (4%) wt loss in the past 6 weeks  Wt Readings from Last 10 Encounters:   06/01/23 106.2 kg (234 lb 3.2 oz)   04/26/23 110.2 kg (243 lb)   04/07/23 109.6 kg (241 lb 9.6 oz)   01/11/23 108.1 kg (238 lb 6.4 oz)   01/05/23 108 kg (238 lb)   08/05/22 109.8 kg (242 lb)   12/16/19 106.6 kg (235 lb)   12/02/19 106.9 kg (235 lb 9.6 oz)   06/13/16 102.5 kg (226 lb)   05/14/13 93 kg (205 lb)     Dosing Weight: 91 kg (adjusted based on current 106.2 kg and IBW 86.4 kg)    Medications/vitamins/minerals/herbals:   Reviewed    LABS  Labs reviewed    ASSESSED NUTRITION NEEDS:  Estimated Energy Needs: 3253-3459 kcals (25-30 Kcal/Kg)  Justification: increased needs associated with ALS  Estimated Protein Needs:  grams protein (1-1.2 g pro/Kg)  Justification:  increased needs associated with ALS  Estimated Fluid Needs: 2275 mL (76 oz)  Justification: maintenance    NUTRITION DIAGNOSIS:  Inadequate oral intake related to dysphagia, eating fatigue, and increased needs associated with ALS as evidenced by 9# (4%) wt loss in the past 6 " weeks.    INTERVENTIONS  Recommendations / Nutrition Prescription  1. Continue Reg diet, thin liquids, or per SLP recommendation  2. Drink 1-2 Oral Nutrition Supplements per day    Nutrition Education     Provided written & verbal education on:   - Ways to maximize kcal and protein intake. Discussed calorie and protein needs for maintenance of weight and nutrition status.  Advised pt to aim for at least 2275 kcal and 90 g protein via 5-6 small frequent meals.  Discussed that as ALS progresses, eating may become more difficult and discussed ways to cope with this.   - ONS (Ensure Enlive, Ensure Plus/Boost Plus, CIB, Benecalorie, Scandi shake etc). Suggested ways to incorporate these supplements to avoid flavor fatigue. Encouraged pt to start consuming 2 ONS daily.       Provided pt with corresponding education materials/handouts on:  Six Small Meals a Day, High Calorie, High Protein Recipe booklet, Tips to Increase the Calories in Your Diet, Tips to Increase the Protein in Your Diet and Protein Sources.      Pt verbalize understanding of materials provided during consult.   Patient Understanding: Excellent  Expected Compliance: Excellent     Implementation  Composition of Meals and Snacks, General/healthful diet and Medical Food Supplement    Goals  1.  Aim for 5-6 small frequent meals  2.  Aim for 2275 kcal and 90 g protein/day  3. Weight maintenance    Follow-Up Plans: Pt has RD contact information for questions.    Pt encouraged to follow up with RD at next clinic visit in 3-6 months.    MONITORING AND EVALUATION:  -Food intake  -Fluid/beverage intake  -Liquid meal replacement or supplement  -Weight trends    Evelyn Lees RDN, LD

## 2023-06-01 NOTE — PROGRESS NOTES
"Esteban Casillas was seen for a genetic counseling appointment at the request of Dr. Carrasco today given his diagnosis of ALS. He was accompanied by his wife Fátima.    Pertinent Medical History: Esteban is a 52 year old male with a history of ALS. He previously underwent genetic testing via a ataxia repeat disorder panel at the [facility] that was negative or normal. See Dr. Carrasco's note for additional details.     Family History: A three generation pedigree was previous obtained at Esteban's appointment with Fritz Berry on 22 and scanned into the EMR. Esteban's father was  at age 74 due to frontotemporal dementia. No other updates to the family history were reported at today's appointment.    Discussion: Amyotrophic lateral sclerosis (ALS) is a condition that affects the motor neurons.  Motor neurons are responsible for sending the necessary signals to the muscles, to allow for movement and speech. In ALS, these motor neurons break down and eventually lead to loss of speech and paralysis. Some individuals with ALS may also experience neuropsychological symptoms such as cognitive and/or behavioral dysfunction or frontotemporal dementia (FTD) in severe cases. The progression of this condition is approximately three to five years, although it can vary with age of onset and between and within families. Historically, ALS has been categorized into \"familial ALS\" when an individual has two or more close relatives with ALS and \"sporadic ALS\" when an individual with ALS has no family history of the condition. ALS is not generally thought of as a genetic or inherited condition; however recently several genetic factors have been found to be either causative or contributory. When a family history of ALS is identified then we have a strong suspicion that there is a causative genetic factor or genetic change (mutation) that predisposes members of the family to ALS. However, as more research and genetic testing " "has become available, individuals with seemingly sporadic ALS can have a gene mutation identified. This may be due to a new (de murray) mutation in that individual or reduced penetrance. Therefore, a negative family history cannot rule out a possible genetic form of ALS. About 10-15% of individuals with ALS are thought to have \"genetic ALS\" meaning a causative gene mutation has been identified.     There are several genes that have been found to be associated with ALS and/or FTD. The most common genetic cause of ALS is a hexanucleotide repeat expansion (GGGGCC) in the [id number] gene. The number of hexanucleotide repeats in the U5sss94 gene ranges from 2 to >4000. The precise cutoff of between normal and pathogenic (disease causing) is complicated by multiple factors, but generally <25 is considered normal and >60 is considered pathogenic. Repeat expansions in the [id number] gene account for approximately 39-45% of familial cases of ALS and about 3-7% of sporadic ALS. Other genes known to cause ALS include: SOD1, FUS, TARDBP, ANG, OPTN, FIG4, VAPB, UBQLN2, SETX, NEK1, VCP, ALS2, TDP43 (CHCHD10, DCTN1, MATR3, PFN1, TUBA4A, UNC13A, ATXN2).    Genetic forms of ALS are typically inherited in an autosomal dominant pattern, meaning a change on one copy of the gene is sufficient to predispose an individual to the condition. Someone who possesses an ALS gene mutation would have a 1 in 2 (50%) chance of passing the gene mutation associated with ALS on to their children. It is import to know that not all people who inherit an ALS gene mutation will develop ALS. This is called reduced penetrance. For example, the penetrance or percent of individuals who develop ALS who have a mutation in SOD1 ranges from 50% to 90% by age 70, depending on the specific genetic mutation. It is assumed that other genes will also show reduced penetrance. We discussed that if the gene associated with ALS is NOT passed on; that child is NOT at an increased " risk to develop symptoms because of this genetic change and CANNOT pass it on to their children. The only way to know if the genetic change is passed on is by genetic testing.    We discussed the availability of genetic testing for ALS. The gold standard of ALS genetic testing is to begin testing the [id number] gene to determine if an individual has a normal number of hexanucleotide repeats (<25) or an expanded number of hexanucleotide repeats (>60). If that testing identifies normal repeats on both copies of F0nxn25, then testing can be expanded to a multi-gene panel to include the other genes associated with ALS. We went on to discuss the details, limitations, and possible outcomes of these multi-gene panels. In particular, we discussed that there are three possible results:    Negative: meaning normal or no mutations are identified in the genes that were tested/sequenced    Positive: meaning a mutation that is known to be associated with a particular set of symptoms is identified    Variant of uncertain significance (VUS): meaning a change in the DNA sequence of a particular gene was seen but there is not enough information or data yet to know if it explains the symptoms. If a VUS is identified, testing of other relatives may be helpful to provide clarification.  In most cases, identification of a VUS does not confirm a diagnosis and does not result in any clinically actionable recommendations.    Even with the growing numbers of genes that have been identified, current clinically available genetic testing identifies a causative mutation in less than 40% families affected with familial ALS. Therefore, most of the time we cannot identify the genetic cause in individuals with ALS. If the results of genetic testing are negative, this cannot rule out the possibility that there may be an underlying genetic cause or component to an individual's ALS, as it is likely that the genetic cause of all forms of ALS have yet to be  discovered. DNA banking is the process in which we can store an individual's DNA almost indefinitely to test at a later time when new genetic tests are available. Additionally, the DNA could be used in research with the participant's consent. This can be done for any genetic condition. It can also be done for conditions which are not thought to be genetic or where there isn't a known family history. Saving the sample may allow for testing as advances are made in diagnosis and treatment.  DNA banking is available through many laboratories including a laboratory called [facility]. Additional information is available upon request.    We discussed the option to complete this testing through a sponsored or non sponsored program. Risks, benefits and limitations of these options and this testing were reviewed. Esteban expressed an excellent understanding of this information and decided to proceed with prevention genetics sponsored ALS panel.    Plan:  1. Sponsored ALS panel with [id number] and ATXN2 repeat analysis at [facility]   2. Return pending results of above testing  3. Contact information was provided should any questions arise in the future.     Hayley Dale [facility]  Genetic Counselor  Division of Genetics and Metabolism  (p) [phone]  (f) [phone]     Total time spent in consultation with the family was approximately 15 minutes    Cc: No Letter

## 2023-06-01 NOTE — Clinical Note
"2023       RE: Esteban Casillas  [address] [facility] [address]     Dear Colleague,    Thank you for referring your patient, Esteban Casillas, to the [facility]. Please see a copy of my visit note below.    Esteban Casillas was seen for a genetic counseling appointment at the request of Dr. Carrasco today given his diagnosis of ALS. He was accompanied by his wife Fátima.    Pertinent Medical History: Esteban is a 52 year old male with a history of ALS. He previously underwent genetic testing via a ataxia repeat disorder panel at the [facility] that was negative or normal. See Dr. Carrasco's note for additional details.     Family History: A three generation pedigree was previous obtained at Esteban's appointment with Fritz Berry on 22 and scanned into the EMR. Esteban's father was  at age 74 due to frontotemporal dementia. No other updates to the family history were reported at today's appointment.    Discussion: Amyotrophic lateral sclerosis (ALS) is a condition that affects the motor neurons.  Motor neurons are responsible for sending the necessary signals to the muscles, to allow for movement and speech. In ALS, these motor neurons break down and eventually lead to loss of speech and paralysis. Some individuals with ALS may also experience neuropsychological symptoms such as cognitive and/or behavioral dysfunction or frontotemporal dementia (FTD) in severe cases. The progression of this condition is approximately three to five years, although it can vary with age of onset and between and within families. Historically, ALS has been categorized into \"familial ALS\" when an individual has two or more close relatives with ALS and \"sporadic ALS\" when an individual with ALS has no family history of the condition. ALS is not generally thought of as a genetic or inherited condition; however recently " "several genetic factors have been found to be either causative or contributory. When a family history of ALS is identified then we have a strong suspicion that there is a causative genetic factor or genetic change (mutation) that predisposes members of the family to ALS. However, as more research and genetic testing has become available, individuals with seemingly sporadic ALS can have a gene mutation identified. This may be due to a new (de murray) mutation in that individual or reduced penetrance. Therefore, a negative family history cannot rule out a possible genetic form of ALS. About 10-15% of individuals with ALS are thought to have \"genetic ALS\" meaning a causative gene mutation has been identified.     There are several genes that have been found to be associated with ALS and/or FTD. The most common genetic cause of ALS is a hexanucleotide repeat expansion (GGGGCC) in the I6coy51 gene. The number of hexanucleotide repeats in the W3gqi88 gene ranges from 2 to >4000. The precise cutoff of between normal and pathogenic (disease causing) is complicated by multiple factors, but generally <25 is considered normal and >60 is considered pathogenic. Repeat expansions in the P2isa61 gene account for approximately 39-45% of familial cases of ALS and about 3-7% of sporadic ALS. Other genes known to cause ALS include: SOD1, FUS, TARDBP, ANG, OPTN, FIG4, VAPB, UBQLN2, SETX, NEK1, VCP, ALS2, TDP43 (CHCHD10, DCTN1, MATR3, PFN1, TUBA4A, UNC13A, ATXN2).    Genetic forms of ALS are typically inherited in an autosomal dominant pattern, meaning a change on one copy of the gene is sufficient to predispose an individual to the condition. Someone who possesses an ALS gene mutation would have a 1 in 2 (50%) chance of passing the gene mutation associated with ALS on to their children. It is import to know that not all people who inherit an ALS gene mutation will develop ALS. This is called reduced penetrance. For example, the " penetrance or percent of individuals who develop ALS who have a mutation in SOD1 ranges from 50% to 90% by age 70, depending on the specific genetic mutation. It is assumed that other genes will also show reduced penetrance. We discussed that if the gene associated with ALS is NOT passed on; that child is NOT at an increased risk to develop symptoms because of this genetic change and CANNOT pass it on to their children. The only way to know if the genetic change is passed on is by genetic testing.    We discussed the availability of genetic testing for ALS. The gold standard of ALS genetic testing is to begin testing the Y2tgq46 gene to determine if an individual has a normal number of hexanucleotide repeats (<25) or an expanded number of hexanucleotide repeats (>60). If that testing identifies normal repeats on both copies of S1lbb63, then testing can be expanded to a multi-gene panel to include the other genes associated with ALS. We went on to discuss the details, limitations, and possible outcomes of these multi-gene panels. In particular, we discussed that there are three possible results:    Negative: meaning normal or no mutations are identified in the genes that were tested/sequenced    Positive: meaning a mutation that is known to be associated with a particular set of symptoms is identified    Variant of uncertain significance (VUS): meaning a change in the DNA sequence of a particular gene was seen but there is not enough information or data yet to know if it explains the symptoms. If a VUS is identified, testing of other relatives may be helpful to provide clarification.  In most cases, identification of a VUS does not confirm a diagnosis and does not result in any clinically actionable recommendations.    Even with the growing numbers of genes that have been identified, current clinically available genetic testing identifies a causative mutation in less than 40% families affected with familial ALS.  Therefore, most of the time we cannot identify the genetic cause in individuals with ALS. If the results of genetic testing are negative, this cannot rule out the possibility that there may be an underlying genetic cause or component to an individual's ALS, as it is likely that the genetic cause of all forms of ALS have yet to be discovered. DNA banking is the process in which we can store an individual's DNA almost indefinitely to test at a later time when new genetic tests are available. Additionally, the DNA could be used in research with the participant's consent. This can be done for any genetic condition. It can also be done for conditions which are not thought to be genetic or where there isn't a known family history. Saving the sample may allow for testing as advances are made in diagnosis and treatment.  DNA banking is available through many laboratories including a laboratory called [facility]. Additional information is available upon request.    We discussed the option to complete this testing through a sponsored or non sponsored program. Risks, benefits and limitations of these options and this testing were reviewed. Esteban expressed an excellent understanding of this information and decided to proceed with prevention genetics sponsored ALS panel.    Plan:  1. Sponsored ALS panel with C6zvf14 and ATXN2 repeat analysis at [facility]   2. Return pending results of above testing  3. Contact information was provided should any questions arise in the future.     Hayley Dale [facility]  Genetic Counselor  Division of Genetics and Metabolism  p) [phone]  (f) [phone]     Total time spent in consultation with the family was approximately 15 minutes    Cc: No Letter        Again, thank you for allowing me to participate in the care of your patient.      Sincerely,    Hayley Dale GC

## 2023-06-07 NOTE — PROGRESS NOTES
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 Pt reports ~1 mo. Rt hand weakness using a stress ball @ home.   SLP Goals   SLP Goals 1;2   SLP Goal 1   Goal Identifier LTG 2-Dysphagia   Goal Description Patient will consistently utilize the chin tuck and reduced rate of intake strategies to reduce frequency of overt s/sx of aspiration/penetration to <3x/day per Pt and family report.   Rationale To maximize safety, ease and/or independence of oral intake   Goal Progress Treatment 4/21/23: LTG 2-Reviewed and reinforced strategies-Pt verbalized understanding and endorses consistent use of chin tuck with liquids which has significantly decreased his overt s/sx of aspiration/penetration. Consistenly taking meds with puree-apple sauce which has again significantly reduced overt s/sx of aspiration. Avoiding popcorn d/t diff. and is feeling ok with this change. Pt verbalized understanding of overt s/sx of aspiration/penetration and asso. risks with 100%.   Target Date 09/05/23   SLP Goal 2   Goal Identifier LTG 4-Exercises/Motor Speech   Goal Description Pt will complete recommended oral motor exercises and SOVT exercises to improve articulatory movement and pitch consistency in order to demonstrate an average of 80% intelligibility for 3+ syllable words in isolation, sentences, and conversation.   Rationale To maximize the ability to communicate wants and needs within the home or community   Target Date 09/05/23    Treatment Interventions (SLP)   Treatment Interventions Treatment Speech/Lang/Voice   Treatment Speech/Lang/Voice   Treatment of Speech, Language, Voice Communication&/or Auditory Processing (15080) 40 Minutes   Skilled Intervention Provided feedback on performance of tasks;Modeled compensatory strategies;Demonstrated voice exercises   Treatment Detail HEP-ongoing. LTG 4-Pt demonstrating 80-90% for /cl/ initial 1 syllable words. increased to connected speech at the sentence level Pt demonstrating 70%, improving to 75% with mod cues, reps, and models.   Education   Learner/Method Patient   Education Comments Education on SLP communication with IP SLP who will be completing OP VFSS w/ Pt doay.   Plan   Plan for next session Use materials in folder to target goals. Semi-structured conversation.   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    Beginning/End Dates of Progress Note Reporting Period:  2/13/2023   to 05/09/2023    Referring Provider:  Hayley Kendrick MD       Thank you for referring Esteban Casillas to outpatient therapy at [facility]. Please call Bobbi Lambert MA, SLP-CCC at [phone] email [email or web address] with any questions or concerns.      Bobbi Lambert M.A., SLP-CCC  Speech-Language Pathologist

## 2023-06-07 NOTE — ADDENDUM NOTE
Encounter addended by: Bobbi Lambert, SLP on: 6/7/2023 1:52 PM   Actions taken: Flowsheet data copied forward, Pend clinical note, Flowsheet accepted, Clinical Note Signed

## 2023-06-07 NOTE — ADDENDUM NOTE
Encounter addended by: Bobbi Lambert, SLP on: 6/7/2023 1:55 PM   Actions taken: Flowsheet data copied forward, Flowsheet accepted

## 2023-06-09 PROBLEM — R47.1 DYSARTHRIA: Status: ACTIVE | Noted: 2023-01-01

## 2023-06-09 PROBLEM — R13.10 DYSPHAGIA: Status: ACTIVE | Noted: 2023-01-01

## 2023-06-09 NOTE — PROGRESS NOTES
PHYSICAL THERAPY EVALUATION  Type of Visit: Evaluation    See electronic medical record for Abuse and Falls Screening details.    Subjective      Presenting condition or subjective complaint: Pt presenting to PT for higher-level balance intervention (per pt/wife). Pt's newly diagnosed with ALS.  Date of onset: 06/01/23 (Date of pt's dx of ALS - though, has had neurologic degeneration for a few months prior.)    Relevant medical history:     Past Medical History:   Diagnosis Date     Hypertension     intermittent elevated BP readings for several years     Other nervous system complications 12/2021    facial weakness, dysarthria, dysphagia, difficulty with processing complex information, impaired judgment     Pain in joint, shoulder region      Dates & types of surgery:     Past Surgical History:   Procedure Laterality Date     ORTHOPEDIC SURGERY  5/30/2022    prepatellar bursitis R knee, I&D     Prior diagnostic imaging/testing results: MRI; EMG     Prior therapy history for the same diagnosis, illness or injury: No      Prior Level of Function   Transfers: Independent  Ambulation: Independent  ADL: Independent  IADL: Childcare, Driving, Housekeeping, Laundry, Meal preparation, Medication management, Yard work    Living Environment  Social support: With a significant other or spouse   Type of home: House   Stairs to enter the home: Yes 3 Is there a railing: Yes   Ramp: No   Stairs inside the home: Yes 12 Is there a railing: Yes   Help at home: None  Equipment owned:   2WW from hx of knee surgery. He doesn't use. Reports he's getting handrails in the shower - they are renovating a bathroom.  Employment: No    Hobbies/Interests:   Spending time with his family, using his exercise gym.    Patient goals for therapy:  To improve his balance.     Pain assessment: Pain denied     Objective   Cognitive Status Examination  Orientation: Oriented to person, place and time   Level of Consciousness: Alert  Follows Commands and  Answers Questions: 100% of the time  Personal Safety and Judgement: Intact, However, likely impaired in complex situations. Will be part of PT to improve pt's safety awareness.   Memory: Intact    POSTURE: WNL  RANGE OF MOTION: LE ROM WNL  UE ROM WNL  STRENGTH: Pt with slightly dec R LE strength (4/5); will target with PT intervention.    BED MOBILITY: WNL    TRANSFERS: WNL; No UEs to rise from chair.    GAIT:   Level of Gooding: Independent  Assistive Device(s): None  Gait Deviations: Base of support decreased  Gait Distance: >100ft during PT session  Stairs: Pt able to complete up/down without use of HR for x4, 6in steps.     BALANCE: Standing Balance (static):Good  Standing Balance (dynamic):Fair    SPECIAL TESTS  Functional Gait Assessment (FGA) TOTAL SCORE: (MAXIMUM SCORE 30): 28    10 Meter Walk Test (Comfortable)   1.8m/s with no AD     Modified CTSIB Conditions (sec) Cond 1: 30s  Cond 2: 30s  Cond 4: 30s  Cond 5 : 30s (though in sway/postural corrections needed)     SENSATION: Pt reporting no numbness/tingling.    MUSCLE TONE: WNL; No change of tone observed R vs. L LEs.     Assessment & Plan   CLINICAL IMPRESSIONS   Medical Diagnosis: ALS    Treatment Diagnosis: impaired functional mobility   Impression/Assessment: Patient is a 52 year old male with balance complaints.  The following significant findings have been identified: Decreased strength, Impaired balance, Impaired gait, Decreased activity tolerance and Instability. These impairments interfere with their ability to perform household mobility and community mobility as compared to previous level of function.     Clinical Decision Making (Complexity):   Clinical Presentation: Evolving/Changing  Clinical Presentation Rationale: based on medical and personal factors listed in PT evaluation  Clinical Decision Making (Complexity): Moderate complexity    PLAN OF CARE  Treatment Interventions:  Interventions: Gait Training, Neuromuscular Re-education,  Therapeutic Activity, Therapeutic Exercise, Self-Care/Home Management    Long Term Goals     PT Goal 1  Goal Identifier: HEP  Goal Description: Pt will demonstrate IND with strength, balance, and muscular and aerobic endurance HEP, while working to improve capacity for functional mobility.  Target Date: 08/07/23  PT Goal 2  Goal Identifier: 5xSTS  Goal Description: Pt will complete x5 STS in <12s, without use of B UEs, to demonstrate typical B LE strength.  Target Date: 08/07/23  PT Goal 3  Goal Identifier: FGA  Goal Description: Pt will score 30pts on FGA, to demonstrate improved dynamic balance and postural control with ambulation, and decreased risk for falling with functional mobility.  Goal Progress: baseline: 28 points  Target Date: 08/07/23  PT Goal 4  Goal Identifier: mCTSIB  Goal Description: Pt will complete 30s for all conditions of mCTSIB to demonstrate improvement of sensory integration, in presence of ALS/high level balance difficulty.  Goal Progress: baseline: 30s, though significant sway on condition #5  Target Date: 08/07/23      Frequency of Treatment: 1x/week  Duration of Treatment: 60 days     Education Assessment:   Learner/Method: Patient;Significant Other;Demonstration;Pictures/Video;Listening (PT spoke with pt's significant other following PT session, in lobby.)    Risks and benefits of evaluation/treatment have been explained.   Patient/Family/caregiver agrees with Plan of Care.     Evaluation Time:     PT Eval, Moderate Complexity Minutes (07103): 30  Signing Clinician: Rama Gutierrez, PT, DPT

## 2023-06-09 NOTE — PROGRESS NOTES
06/09/23 1100   Signing Clinician's Name / Credentials   Signing clinician's name / credentials Rama Gutierrez, PT, DPT   Functional Gait Assessment (PETERSON Bazzi, TORI Donahue, et al. (2004))   1. GAIT LEVEL SURFACE 3  (1.8m/s with no AD)   2. CHANGE IN GAIT SPEED 3   3. GAIT WITH HORIZONTAL HEAD TURNS 2   4. GAIT WITH VERTICAL HEAD TURNS 3   5. GAIT AND PIVOT TURN 3   6. STEP OVER OBSTACLE 3   7. GAIT WITH NARROW BASE OF SUPPORT 2   8. GAIT WITH EYES CLOSED 3   9. AMBULATING BACKWARDS 3   10. STEPS 3   Total Functional Gait Assessment Score   TOTAL SCORE: (MAXIMUM SCORE 30) 28       Functional Gait Assessment (FGA): The FGA assesses postural stability during various walking tasks.   Gait assistive device used: none      Patient Score: 28/30 - Pt not at inc risk for falling - though, dynamic gait deficits present.    Scores of <22/30 have been correlated with predicting falls in community-dwelling older adults according to Jluis & Syd 2010.   Scores of <18/30 have been correlated with increased risk for falls in patients with Parkinsons Disease according to Sergio Smyth Zhou et al 2014.  Minimal Detectable Change for patients with acute/chronic stroke = 4.2 according to Sonali & Doeschel 2009  Minimal Detectable Change for patients with vestibular disorder = 8 according to Jluis & Syd 2010     Assessment (rationale for performing, application to patient s function & care plan): s/p ALS with demonstrated balance impairments, repeated as has demonstrated improved gait, balance and overall functional mobility since initial assessment.

## 2023-06-09 NOTE — PROGRESS NOTES
Social Work -ALS Clinic Progress Note  [facility]    Data/Intervention:    Patient Name:  Umm Casillas  /Age:  1970 (52 year old)    Visit Type: telephone    Collaborated With:    -Umm and wife Fátima  -Dr Carrasco and members of the ALS interdisciplinary team    Psychosocial info/Concerns:   See also previous SW note. Had contact with Pt/wife about a year ago, focused on SSDI.  Umm now has SSDI and will get Medicare likely in July. He now has dx of ALS and that dx makes more sense to them with his symptoms. There is frustration with how long it took to get to that dx but they are accepting and moving forward. They are happy with the amount of support available.   They lives in their own home. Fátima is looking at possible bathroom modification to prepare for the future. Discussed that there is a ramp program thru [facility]. He is now registered with [facility].  They have started work with an  on a Will and $POA. They are meeting with their  next week. Fátima wants to consider having Umm use his ALESSIA to do things he wants to do. They feel financially secure now as long as Fátima is still working at least 6 days per pay period. She picks up other private duty jobs on the side too but doesn't have to. Insurance is thru Fátima. She is on an intermittent FMLA to be able to take Umm to app. She is aware she can do a full 90 day FMLA if needed.   Umm has a POLST. It's not in his medical record here. It appears he did it at an [facility] clinic. Fátima will get a copy to me to get into his record. He would like to complete a health care directive. I will mail a copy to them.   They have a good support system. Fátima has nurse friends who have offered to help when needed. Umm is fairly independent now. They also have offers from friends to help.   Fátima has a dtr 18, that they just moved into an apt. She is on the autism spectrum and having her at home was  "difficult. They have children ages 13, 15 at home that are coping ok. Esteban reports it's hard and he is \"so-so\".  They have been experiencing his decline for a few years so have adjusted to what is.     Intervention/Education/Resources Provided:  Provided info and support. Discussed the ALSA services they can tap into for themselves and for the kids, elisha now the quality of life francisco javier, the ramp program and support groups.  Will send a health care directive. Discussed [facility] free meals and they will consider and let me know if they need the referral sent.   They were on their way to an appt so we ended the conversation.    Assessment/Plan:  Pt/wife coping ok. Fátima has a lot on her shoulders with work, care of their teenagers, preparing for the future and coping with this enormous life change facing them. Esteban did not communicate very much during the call. Will continue to follow in ALS clinic.    Provided patient/family with contact information and encouraged them to contact me between clinic visits as needed.     Belinda Kramer, MSW, [facility]    [facility]  [phone]        "

## 2023-06-12 NOTE — PROGRESS NOTES
OCCUPATIONAL THERAPY EVALUATION  Type of Visit: Evaluation    See electronic medical record for Abuse and Falls Screening details.    Subjective      Presenting condition or subjective complaint: Pt presenting to PT for higher-level balance intervention (per pt/wife). Pt's newly diagnosed with ALS.  Date of onset: 06/02/23 (order date)    Relevant medical history:     Past Medical History:   Diagnosis Date     Hypertension     intermittent elevated BP readings for several years     Other nervous system complications 12/2021    facial weakness, dysarthria, dysphagia, difficulty with processing complex information, impaired judgment     Pain in joint, shoulder region    Dates & types of surgery:     Past Surgical History:   Procedure Laterality Date     ORTHOPEDIC SURGERY  5/30/2022    prepatellar bursitis R knee, I&D     Prior diagnostic imaging/testing results: MRI; EMG     Prior therapy history for the same diagnosis, illness or injury: No      Prior Level of Function   Transfers: Independent  Ambulation: Independent  ADL: Independent    Living Environment  Social support: With a significant other or spouse   Type of home: House   Stairs to enter the home: Yes 3 Is there a railing: Yes   Ramp: No   Stairs inside the home: Yes 12 Is there a railing: Yes   Help at home: None  Equipment owned:       Employment: No    Hobbies/Interests:  Gardening, season [employer] [place], traveling. Going to [place] end of June.     Patient goals for therapy:  Pinching, cutting     Objective   Right hand dominant  Patient reports symptoms of weakness/loss of strength    Edema  None    Sensation   WNL throughout all nerve distributions; per patient report    ROM  WNL    Strength  Strength   (Measured in pounds)  Pain Report: - none  + mild    ++ moderate    +++ severe    6/12/2023 6/12/2023   Trials L R   1   44 68   Average 44 68     Lat Pinch 6/12/2023 6/12/2023   Trials L R   1   18 9   Average 18 9     3 Pt Pinch 6/12/2023  6/12/2023   Trials L R   1   14 8   Average 14 8       Assessment & Plan   CLINICAL IMPRESSIONS   Medical Diagnosis: ALS    Treatment Diagnosis: Hand weakness    Impression/Assessment: Pt is a 52 year old male presenting to Occupational Therapy due to gradual onset, ~2 months ago.  The following significant findings have been identified: Impaired strength.  These identified deficits interfere with their ability to perform self care tasks, recreational activities, household chores and meal planning and preparation as compared to previous level of function.   Patient's limitations or Problem List includes: Decreased ROM/motion, Decreased stability, Decreased , Decreased pinch and Tightness in musculature of the right hand which interferes with the patient's ability to perform Self Care Tasks (dressing, eating, bathing, hygiene/toileting), Recreational Activities and Household Chores as compared to previous level of function.    Clinical Decision Making (Complexity):   Assessment of Occupational Performance: 5 or more Performance Deficits  Occupational Performance Limitations: bathing/showering, toileting, dressing, feeding, functional mobility, hygiene and grooming, health management and maintenance, home establishment and management, meal preparation and cleanup, shopping and leisure activities  Clinical Decision Making (Complexity): Low complexity    PLAN OF CARE  Treatment Plan:   Modalities:  Paraffin  Therapeutic Exercise:  AROM, AAROM, PROM, Tendon Gliding, Blocking, Reverse Blocking, Place and Hold, Contract Relax, Extensor Tracking, Isotonics, Isometrics and Stabilization  Neuromuscular re-education:  Nerve Gliding, Coordination/Dexterity, Sensory re-education, Desensitization, Kinesthetic Training, Proprioceptive Training, Kinesiotaping, Strain Counter Strain, Isometrics, Stabilization   Manual Techniques:  Coordination/Dexterity, Joint mobilization, Scar mobilization, Friction massage, Myofascial  release, and Manual edema mobilization  Orthotic Fabrication:  Static and Hand based  Self Care:  Self Care Tasks and Ergonomic Considerations    Long Term Goals   OT Goal 1  Goal Identifier: Household IADLs - gripping  Goal Description: Open a tight or new bottle while wearing orthosis  Rationale: In order to maximize safety and independence with performance of self-care activities  Goal Progress: Initiated POC  Target Date: 08/12/23    Frequency of Treatment: 1x/month  Duration of Treatment: 2 months     Risks and benefits of evaluation/treatment have been explained.   Patient/Family/caregiver agrees with Plan of Care.     Evaluation Time:    OT Eval, Low Complexity Minutes (85753): 20  Signing Clinician: Noemy Sullivan OT                       Yes

## 2023-06-12 NOTE — TELEPHONE ENCOUNTER
Ventilator order and additional clinical documentation faxed to [facility] (fax [phone]).    Diana Brown RN

## 2023-06-12 NOTE — TELEPHONE ENCOUNTER
[facility] Call Center    Phone Message    May a detailed message be left on voicemail: yes     Reason for Call: Other: Sulma from [facility] called and stated that the patients ventilator is out of network and they can not help the patient with this. Please call back and advise at [phone].    Action Taken: Message routed to:  [facility] ([facility] Neurology    Travel Screening: Not Applicable

## 2023-06-15 NOTE — PROGRESS NOTES
Social Work Follow-Up  [facility]    Data/Intervention:  Patient Name:  Esteban Casillas  /Age:  1970 (52 year old)    Reason for Follow-Up:  DME and other issues    Collaborated With:    -Ruth, Pt's spouse    Intervention/Education/Resources Provided:  Ruth would like to go thru with a referral to [employer] for meals. It will relieve some burden for her. Will send provider portion of the referral to [employer] [facility] and she will do the rest of the application.  She also found that [facility] where the bipap referral was sent is not in network. [employer] also isn't in network. She listed some companies in network and I forwarded to our nurse ([facility], [employer], [employer], [employer], [employer], [employer])  She emailed the front page of the POLST but didn't have the other side so we can't put it in his record here until both sides are available (not considered valid without both sides). She has requested a copy from [employer] as they only have the one side.  We also discussed his cognitive changes that are a challenge and of which he doesn't recognize.   -He was making financial mistakes so Ruth took over finances a few months ago.  -he isn't dressing like he used to always be pretty dressed up when he went out  -Can't keep track of appts so Ruth has taken over  -Using fewer words to communicate and sometimes sentences don't make sense.  -He isn't understanding sometimes what is said to him  -He is replaying some scenes in movies over and over  -Not able to do multi-steps tasks, lacks insight and judgement per ruth.     We discussed the implications of behavior and how she is having to provide more supervision and oversight. He is still ok to be on his own during the day.  Encouraged her to review the aftd.org website.     He doesn't want a feeding tube at least for now. She said eating is a chore and she wishes he would do the Gtube.     They plan to work on the health care directive soon and get a  copy to me.     Assessment/Plan:  Ruth is coping well for all that is on her plate. Remain available.     Previously provided patient/family with writer's contact information and availability.       MARLENI Reza, [facility]    [facility] [address] [place] [id number]    [phone]

## 2023-06-26 NOTE — PROGRESS NOTES
OCCUPATIONAL THERAPY EVALUATION  Type of Visit: Evaluation    See electronic medical record for Abuse and Falls Screening details.    Subjective      Presenting condition or subjective complaint: Pt presenting to PT for higher-level balance intervention (per pt/wife). Pt's newly diagnosed with ALS.  Date of onset: 06/02/23        Prior diagnostic imaging/testing results: MRI; EMG     Prior therapy history for the same diagnosis, illness or injury: No      Prior Level of Function   Transfers: Independent  Ambulation: Independent  ADL: Independent  IADL: Driving, Finances, Housekeeping, Laundry, Meal preparation, Medication management, Work, Yard work    Living Environment  Social support: With a significant other or spouse   Type of home: House   Stairs to enter the home: Yes 3 Is there a railing: Yes   Ramp: No   Stairs inside the home: Yes 12 Is there a railing: Yes   Help at home: None  Equipment owned:       Employment: No    Hobbies/Interests:      Patient goals for therapy:      Pain assessment: Pain notes pain before bed, due to using his voice during the day     Objective     Cognitive Status Examination  Orientation: Oriented to person, place and time   Level of Consciousness: Alert  Follows Commands and Answers Questions: 100% of the time  Personal Safety and Judgement: Intact  Memory: Impaired Pt and spouse note short term memory issues. Pt completed CPT with total average score of 4.91. See CPT note for details  Attention: No deficits identified  Organization/Problem Solving: No deficits identified  Executive Function: Self awareness/monitoring impaired            Pt independent with ADLs.  Pt getting bathroom remodel in August to install shower without a threshold and grab bars for safety.    BATHING: Independent  Equipment:     UPPER BODY DRESSING: Independent  Equipment:    LOWER BODY DRESSING: Independent  Equipment:     TOILETING: Independent  Equipment:    GROOMING: Independent  Equipment:      EATING/SELF FEEDING: Independent   Equipment:         INSTRUMENTAL ACTIVITIES OF DAILY LIVING (IADL):  Meal Planning/Prep: Participates in cooking tasks at home   Home/Financial Management: Pts spouse takes care of finances    Community Mobility: Pt driving with self limitations of familiar routes in good weather and daytime hours.              Assessment & Plan   CLINICAL IMPRESSIONS   Medical Diagnosis: ALS    Treatment Diagnosis: decreased ADLs/IADLs    Impression/Assessment: Pt is a 52 year old male presenting to Occupational Therapy due to ALS.  The following significant findings have been identified: Impaired cognition.  These identified deficits interfere with their ability to perform work tasks, recreational activities, medication management, financial management and meal planning and preparation as compared to previous level of function.     Clinical Decision Making (Complexity):   Assessment of Occupational Performance: 3-5 Performance Deficits  Occupational Performance Limitations: bathing/showering, dressing, driving and community mobility, health management and maintenance, meal preparation and cleanup, shopping, work and social participation  Clinical Decision Making (Complexity): Moderate complexity    PLAN OF CARE  Treatment Interventions:   Interventions: Self-Care/Home Management    Long Term Goals          Frequency of Treatment: 1x  Duration of Treatment: 1day     Recommended Referrals to Other Professionals:   Education Assessment: Learner/Method: Patient;Family;Significant Other  Education Comments: Pt instructed in safety recommendations and gave handout for reference.  Pt verbalized his understanding.     Risks and benefits of evaluation/treatment have been explained.   Patient/Family/caregiver agrees with Plan of Care.     Evaluation Time:    OT Eval, Moderate Complexity Minutes (98862): 75   Evaluation Only     Signing Clinician: Radha Chavez OT

## 2023-06-26 NOTE — PROGRESS NOTES
Cognitive Performance Test    SUMMARY OF TEST:    The Cognitive Performance Test (CPT) is a standardized performance-based assessment to measure working memory/executive function processing capacities that underlie functional performance. Subtasks include common basic and instrumental activities of daily living (ADL/IADL) which are rated based on the manner in which patients respond to task demands of varying complexity. The total CPT score describes a level of functioning that indicates how information is processed, implications for functional activities, potential safety risks and a recommended level of supervision or assist based on cognitive function. The highest total score on this test is in the range of 5.6 to 5.8.    DATE OF TESTIN2023    RESULTS OF TESTING:                                                                                         CPT Subtest Results    MEDBOX: 4. SHOP/GLOVES:  PHONE:    WASH:   TRAVEL:  TOAST:    DRESS:    TOTAL CPT SCORE:  29.5/33     Average CPT Score  4.91/5.5    INTERPRETATION OF TEST RESULTS:    Based on the Cognitive Performance Test, this patient scored at CPT Level 4.5.  See CPT Levels reference below.    Summary of functional cognitive status:   Mild to moderate functional decline: significant difficulty with completion of complex daily tasks and start of difficulty with self-care tasks. Shows significant decline with memory, judgment and planning.  Person may not be aware of his own deficits and may be able to verbally speak better than he can actually perform.     Factors affecting performance:  Decreased UE strength/coordination  Limited Endurance  New or complex medical issue    Recommendations:    Assist for ADL/IADL:  Finances  Supervision for ADL/IADL:  ADL, Meal preparation, Cleaning, Laundry, Shopping, Driving, and Medication management  Supervision in living setting:  Daily checks                                                    "    TIME ADMINISTERING TEST: 55 min    TIME FOR INTERPRETATION AND PREPARATION OF REPORT: 20 min    TOTAL TIME: 75 min      CPT Levels Reference:    Patient's Average CPT Score:  4.91                                                                                                                                                  Individual scores range along a continuum as outlined below.  In addition to cognitive status, other factors may affect safety in a home environment.  Please refer to specific recommendations for this patient.    ___5.6-5.8  Normal functioning (absence of cognitive-functional disability).  Independent in managing personal affairs, monitors and directs own behavior.  Uses complex information to carry out daily activities with safety and accuracy.    Proficient with instrumental activities of daily living (IADL) and learning new activity.  Problems are anticipated, errors are avoided, and consequences of actions are considered.      ___5.0   Mild cognitive-functional disability; deficits in working memory and executive thought processes. Difficulty using complex information. Problems may be observed with recent memory, judgment, reasoning and planning ahead. May be impulsive or have difficulty anticipating consequences.  Safety:  May require assistance to plan ahead; or to manage complex medication schedules, appointments or finances.  Hazardous activities may need to be monitored or limited.  ADL:  Mild functional decline.  Able to complete basic self-care and routine household tasks.  May have difficulty with complex daily tasks such as reading, writing, meal preparation, shopping or driving.   Learns through hands on teaching. Self-centered behavior or difficulty considering the needs of others may be seen related to trouble seeing the  whole picture\". Can appear disorganized or uninhibited.    _x__4.5  Mild to moderate cognitive-functional disability. Significant deficits in working memory " and executive thought processes. Judgment, reasoning and planning show obvious impairment.  Distractible with inability to shift attention/actions given competing stimuli.  Difficulty with problem solving and managing details. Complex daily tasks performed with inconsistency, difficulty, or error.     Safety:  Medications should be monitored, stove use may require supervision, and driving ability may be affected.  Impaired safety awareness with inability to anticipate potential problems.  May not recognize or respond to emergent situations. Requires frequent check-in support.   ADL:  Mild difficulty with simple everyday self-care tasks. Benefits from structured, routine activity.  Will likely need reminders to complete tasks outside of the routine. Requires assistance with planning and IADL tasks like shopping and finances. Learns concrete tasks through repetition, but performance may not generalize. Tends to be impulsive with poor insight. Self centered behavior or inability to consider the needs of others is common.    ___4.0  Moderate cognitive-functional disability; abstract to concrete thought processes. Working memory and executive function impairments are obvious. Difficulty with planning and problem solving.  Behavior is goal-directed, but unable to follow multi-step directions, is easily distracted, and may not recognize mistakes.  Inability to anticipate hazards or understand precautions.  Safety:  Recommend 24-hour supervision for safety. Supervision needed for medication management and for hazardous activities. May not be able to follow a restricted diet. Can get lost in unfamiliar surroundings. Generally, persons functioning at level 4 should not be driving.   ADL:  Some decline in quality or frequency of ADL.  [place] enhanced by use of a routine, simple concrete directions, and caregiver set-up of needed items. Complex tasks such as money or home management typically requires assistance.  Relies  heavily on vision to guide behavior; will ignore objects/hazards not in plain sight and can be distracted by irrelevant objects. Often has poor insight.  Able to carry out social conversation and may verbally  cover  for deficits leading caregivers to believe they are capable of functioning independently.       ___3.5  Moderate cognitive-functional disability; increased cues needed for task completion. Aware of concrete task steps but needs prompting or cues to initiate and complete simple tasks. Attention span is limited, simple directions may need to be repeated, and re-focus to a topic or task may be required.  Safety:  24-hour supervision required for safety and for assistance with daily tasks. Assistance required with medications, and access to medication should be limited. Meals, nutrition and dietary restrictions need to be monitored.  All hazardous activities should be restricted or supervised. Should not drive. Prone to wandering and can become lost.  ADL:  Moderate functional decline. Familiar tasks usually requires set-up of supplies and directions to complete steps. May need objects handed to them for task initiation. Function best with a set schedule in familiar surroundings with familiar people. All complex tasks must be done by others. Vocabulary is diminished and speech often unfocused.

## 2023-06-28 NOTE — LETTER
June 28, 2023      TO: Esteban Casillas  [address] [place] [id number]       Dear Esteban,    Thank you for allowing me to be a part of your healthcare at the [facility].  At your visit on 6/28/23 we reviewed the results of your genetic testing for [id number] ALS. 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 This letter is a brief summary of our discussion and these genetic test results. I have also included a copy of the lab report for your records.     Our genes are sequences of letters that provide instructions that help our body grow, develop and function. We all have two copies of every gene. We inherit one from our mother and one from our father. We all have changes or variations in our genes that make us unique. Some variations cause the body to be unable to read the instructions. These variations are called pathogenic and can result in a genetic condition. In the L7ofo43 gene, this variation or change is called a repeat expansion. Your genetic testing looked at the B0qrd29 gene to determine if a repeat expansion was present.     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 Everyone has a group of six letters that repeat over and over again in their G3ngs44 gene (hexanucleotide repeats). However, most people have less than 24 repeats in this gene. Your genetic test results confirmed that there are 6 repeats in one copy of your H9oom14 gene and more than 30 repeats in the other copy of your H8brp58 gene. While we are unable to determine exactly how many repeats are present, any number of repeats over 30 is considered disease causing or pathogenic. This test result provides an explanation for the cause of your ALS diagnosis. This test results also confirms that you are at an increased risk to develop frontotemporal  dementia       Clinical Presentation of ALS and FTD  Amyotrophic lateral sclerosis (ALS) is a condition that affects the motor neurons.  Motor neurons are responsible for sending the necessary signals to the muscles, to allow for movement and speech. In ALS, these motor neurons break down and eventually lead to loss of speech and paralysis. Some individuals with ALS may also experience neuropsychological symptoms such as cognitive and/or behavioral dysfunction or frontotemporal dementia (FTD) in severe cases. FTD is a type of dementia that causes degeneration of a part of the brain called the frontal anterior temporal lobes. This can lead to memory loss, changes in personality and language impairment.     For patients with genetic ALS due to one R8kbt64 repeat expansion, the age of onset of disease is typically younger than in those without the repeat expansion.  Additionally, patients with genetic ALS due to F9dju81 repeat expansions are more likely to have frontotemporal dementia compared to ALS due to an unknown cause. Many individuals with genetic ALS due to B2bmz20 repeat expansions will have a family history of disease and this test result provides a likely explanation for your father's diagnosis of FTD.     A variant in the J2vwe98 gene presents differently in different people. Individuals with a variant in this gene may develop ALS, FTD or both of these conditions. Additionally, these conditions have a highly variable age of onset. Some individuals with Y3ivu97 repeat expansions develop symptoms in their 20s while others stay healthy until they are in their 90s. Generally, the older an individual with a D7wqh59 repeat expansion is, the higher their risk of developing ALS, FTD or both. We are unable to predict which features of this condition a person will have in the future, the age of onset for these conditions or the speed of progression based on genetic testing.      Inheritance of T1cap33  variants  R2xxp66 repeat expansions are inherited in an autosomal dominant pattern, meaning that a person only needs one nonworking gene copy to show signs or symptoms of a condition and that both males and females can have the condition. If a parent has a J2tnn53 repeat expansion, there is a 50% chance with each pregnancy that they will have a child who also has this genetic change and a 50% chance that they will have a child who does not have this repeat expansion. It is likely that you inherited this repeat expansion from your father.    Next Steps  It is important that you continue to follow with your neurology care team for up to date medical care recommendations.     Thank you again for allowing us to be a part of your care. Please do not hesitate to contact me with additional questions or concerns.    Hayley Dale MS [facility]  Genetic Counselor  Division of Genetics and Metabolism  (p) [phone]

## 2023-06-28 NOTE — TELEPHONE ENCOUNTER
Contacted Esteban to review the following information regarding his genetic test results via the comprehensive ALS gene panel. Esteban expressed an excellent understanding of this information. He has no interest in being a part of any research trials. He does not have any living biological relatives that he is aware of.     Dear Esteban,  Thank you for allowing me to be a part of your healthcare at the [facility].  At your visit on 6/28/23 we reviewed the results of your genetic testing for [id number] ALS. As we discussed on the phone, your genetic test results included one disease causing (pathogenic) variant in the H4wnw15 gene. This letter is a brief summary of our discussion and these genetic test results. I have also included a copy of the lab report for your records.     Our genes are sequences of letters that provide instructions that help our body grow, develop and function. We all have two copies of every gene. We inherit one from our mother and one from our father. We all have changes or variations in our genes that make us unique. Some variations cause the body to be unable to read the instructions. These variations are called pathogenic and can result in a genetic condition. In the W7owe08 gene, this variation or change is called a repeat expansion. Your genetic testing looked at the L1sag45 gene to determine if a repeat expansion was present.     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 Everyone has a group of six letters that repeat over and over again in their O9cwu23 gene (hexanucleotide repeats). However, most people have less than 24 repeats in this gene. Your genetic test results confirmed that there are 6 repeats in one copy of your K1ooe57 gene and more than 30 repeats in the other copy of your T1qfi99 gene. While we are unable to determine exactly how many repeats are present, any number of repeats over 30 is  considered disease causing or pathogenic. This test result provides an explanation for the cause of your ALS diagnosis. This test results also confirms that you are at an increased risk to develop frontotemporal dementia     Clinical Presentation of ALS and FTD  Amyotrophic lateral sclerosis (ALS) is a condition that affects the motor neurons.  Motor neurons are responsible for sending the necessary signals to the muscles, to allow for movement and speech. In ALS, these motor neurons break down and eventually lead to loss of speech and paralysis. Some individuals with ALS may also experience neuropsychological symptoms such as cognitive and/or behavioral dysfunction or frontotemporal dementia (FTD) in severe cases. FTD is a type of dementia that causes degeneration of a part of the brain called the frontal anterior temporal lobes. This can lead to memory loss, changes in personality and language impairment.     For patients with genetic ALS due to one F8cjw74 repeat expansion, the age of onset of disease is typically younger than in those without the repeat expansion.  Additionally, patients with genetic ALS due to C6pli99 repeat expansions are more likely to have frontotemporal dementia compared to ALS due to an unknown cause. Many individuals with genetic ALS due to I5pgy73 repeat expansions will have a family history of disease and this test result provides a likely explanation for your father's diagnosis of FTD.     A variant in the F8oqg55 gene presents differently in different people. Individuals with a variant in this gene may develop ALS, FTD or both of these conditions. Additionally, these conditions have a highly variable age of onset. Some individuals with R0mlt43 repeat expansions develop symptoms in their 20s while others stay healthy until they are in their 90s. Generally, the older an individual with a G3omb02 repeat expansion is, the higher their risk of developing ALS, FTD or both. We are unable to  predict which features of this condition a person will have in the future, the age of onset for these conditions or the speed of progression based on genetic testing.      Inheritance of C4npl71 variants  E9tsc37 repeat expansions are inherited in an autosomal dominant pattern, meaning that a person only needs one nonworking gene copy to show signs or symptoms of a condition and that both males and females can have the condition. If a parent has a Z4bdb60 repeat expansion, there is a 50% chance with each pregnancy that they will have a child who also has this genetic change and a 50% chance that they will have a child who does not have this repeat expansion. It is likely that you inherited this repeat expansion from your father.    Next Steps  It is important that you continue to follow with your neurology care team for up to date medical care recommendations.     Thank you again for allowing us to be a part of your care. Please do not hesitate to contact me with additional questions or concerns.    Hayley Dale MS [facility]  Genetic Counselor  Division of Genetics and Metabolism  (p) [phone]

## 2023-07-25 NOTE — PROGRESS NOTES
[facility], 7/14/2023 Home Non-Invasive Trilogy Ventilator Setup at Patient's Home and 7/19/2023 Home Oral Suction Setup at Patient's Home    TREATMENT PLAN AND GOALS:  PATIENT INSTRUCTED ON USE AND CARE OF THE PAP EQUIPMENT IN ACCORDANCE WITH THE [facility] PRACTICE GUIDELINES.    DATE OF HOME SET UP: 7/14/2023 11AM    NIV DEVICE: TRILOGY  NIV MODE: ST AVAPS ON   RX NIV SETTINGS: IPAP 8-15 CMH20, EPAP 4-5 CMH20 TITRATE TO PATIENT COMFORT  RX SETTINGS: TITRATED TO PATIENT COMFORT, NIV MODE: ST AVAPS ON, RATE 8 BPM, IPAP MIN 8, IPAP MAX 15, EPAP 5 TI 1.2, , TRIG AUTO-TRAK, RISE TIME 2, AVAPS SPEED 5, ALARMS OFF. PATIENT MONITORING RATE 16 BPM, -1000, PIP 12.    ACTUAL SETTINGS TITRATED TO PATIENT COMFORT: Mode S/T, Circuit type: Passive, AVAPS On, AVAPS speed 5.0 cmH2O/min, Tidal volume 400 ml, Max inspiratory pressure, 15.0 cmH2O, Min inspiratory pressure 8.0 cmH2O,  EPAP 5.0 cmH2O, Breath rate 8 BPM,  Inspiratory time 1.2 seconds, Trigger type: Auto-Trak, Rise time 2, Insp time min/max Off, Circuit size, Adult, Humidification Off, Preset name: Nighttime.    PATIENT MONITORING DATA: Avg PIP 9.4 cmH2O, Avg end expiratory 4.6 cmH2O, Avg breaths per minute  13.1 BPM, Avg patient triggered breaths 76.4 %, Avg peak flow 26.9 l/min, Avg Vte 423.6 mL, Avg minute ventilation Vte 6.9 l/min, Avg minute ventilation 6.9 l/min, Mean airway pressure 5.9 cmH2O, I:E ratio 1:2,  Avg total leak 36.6 l/min, Avg unintentional leak 14.2 l/min.    PERSON CALLING: ASTER KLEIN (SPOUSE)  CALL BACK NUMBER: [phone]  REASON FOR CALL: NEED TRILOGY NIV ALS  OUTCOME: RECEIVED APPROVED PA, DEVICE SETUP.     RX: 6/5/2023, MD: Guevara Carrasco E  MED [facility]: 6/1/2023 Guevara Carrasco E  INSURANCE: [facility] 7/6/2023 E-MAILED LORRAINE TO RE-VERIFY INSURANCE AND SUBMIT URGENT PA  DIAGNOSIS: ALS NEUROMUSCULAR  PFT (DATE/RESULT): MIP -34 ON 6/1/2023.  IS OXYGEN NEEDED: NO.    VENT CHECK: YES  ALARMS: YES  CIRCUIT:  YES  HUMIDITY: YES  VITALS: HR: 98 BPM, SPO2: 97% ON RA, RR: 14 BPM  MASK PREFERENCES: 1ST CHOICE NUCENCE PRO LG, 2ND CHOICE AIRFIT F20 LG, 3RD CHOICE AIRFIT N20 LG, HATED F30i, I LEFT A CHIN STRAP HE DIDN'T WANT TO TRY IT BUT WANTED ME TO LEAVE IT INCASE HE WANTED TO TRY IT LATER.     BREATH SOUNDS: CLEAR T/O  SECRETIONS: COLOR: THIN, CONSISTENCY: CLEAR, COUGH EFFECTIVENESS: GOOD, I DID MENTION THAT WE CARRY COUGH ASSIST IF HE HAS TROUBLE COUGHING.   LOC: ALERT AND ORIENTED    PATIENT WAS DROOLING AND HIS SPEECH IS SLOW AND DIFFICULT TO UNDERSTAND. I THEM KNOW WE OFFER SUCTION AND COUGH ASSIST. PATIENT WOULD LIKE ORAL SUCTION. WHEN I GOT BACK FROM APPINTMENT...IN [facility] I PENDED RX TO ORDERING MD. [facility] REQUESTED VIA TEAMS AND E-MAIL TO ORDERING MD AND ASKED HIM TO SIGN PENDED RX. MD SIGNED RX FOR SUCTION AND ADDED [facility] NOTE FOR ORAL SUCTION DEVICE. ORAL SUCTION SETUP AT PATIENT'S HOME ON 7/19/2023 AT THE PATIENT'S HOME.    PATIENT AND WIFE WOULD LIKE A STAND FOR TRILOGY AND HEATER TO MOVE DEVICE BACK AND FORTH TO GET USE TO WEARING DEVICE DURING THE DAY AND AT NIGHT. I WILL BRING ONE OUT ON WEDNESDAY 7/19/2023 AT 11AM.     [facility] FOLLOW UP PLANNED FOR? AS NEEDED  FURTHER FOLLOW UP WITH MD NEEDED? AS NEEDED  DOES THE PATIENT HAS AN APPT SCHEDULED TO ESTABLISH CARE WITH AN NIV MANAGEMENT PROVIDER? YES AS NEEDED.    MD: Guevara Carrasco   ADDRESS: [address], [facility], [facility] FLO[address] CLINIC [address] [place] [id number] [facility]  Phone: [phone]  Fax: [phone]  NPI: [id number]    PATIENT WAS OFFERED CHOICE OF VENDOR AND CHOSE [facility].  PATIENT TO FOLLOW MEDICARE LCD STANDARDS FOR USE REQUIREMENTS AND INSTRUCTED TO FOLLOW UP WITH THEIR PHYSICIAN WITHIN THESE GUIDELINES.  PATIENT WAS PROVIDED CARE SHEET AND INSTRUCTED TO CALL ONE OF OUR LOCATIONS WITH QUESTIONS AND CONCERNS.  PATIENT WILL BE FOLLOWED UP WITH ACCORDING [facility] PROTOCOL.  I HAVE VERIFIED THE DISPENSED MACHINE IS EITHER IN  ORIGINAL PACKAGING FROM THE  OR HAS BEEN CHECKED AND FUNCTIONAL BASED ON THE CERTIFICATION STICKER ON THE OUTER PACKAGING.  THEREFORE, THE EQUIPMENT IS SAFE FOR DISPENSING TO THIS PATIENT.    TRILOGY DEVICE ADDED TO CARE .    7/19/2023 ORAL SUCTION SETUP    PT WAS GIVEN INSTRUCTION AND EDUCATION ON THE CARE AND MAINTENANCE OF THE SUCTION UNIT AND ASSOCIATED SUPPLIES IN ACCORDANCE WITH THE [facility] PRACTICE GUIDELINES.  THE SUCTION MACHINE RECEIVED A QUALITY CHECK FOR PROPER CLEANING AND FUNCTION OF THE UNIT PRIOR TO SET UP ON THE PATIENT.    RX: SUCTION MACHINE & SUPPLIES, ORAL SUCTION, NO PA REQUIRED,  PURCHASE ONLY FOR .  OUT OF POCKET AND DEDUCTIBLE ARE ALL MET.    RX SIGNED BY: Guevara Murcia  DATE SIGNED: 7/14/2023  MAGDY: [id number]  DIAGNOSIS: ALS DYSPHAGIA  [facility] REQUESTED VIA TEAMS AND E-MAIL TO: Guevara Murcia.     DATE PATIENT WAS SETUP ON: 7/19/2023  LOCATION OF SETUP: PATIENT'S HOME  SETUP BY: CHERRIE RT   FOLLOW UP NEEDED? YES I WILL FOLLOW UP WHEN NEEDED.    7/17/2023 [facility] RECEIVED! MD SENT ME AN E-MAIL LETTING ME KNOW HE ADDENDED HIS NOTE FROM 6/1/2023.    Cherrie Farias  Respiratory Therapist   [facility]  Home Medical Equipment  [address]  [address]  [place] [address]  [email or web address]  [email or web address]  Office: [phone] Fax: [phone]  Employed by [employer]

## 2023-07-26 NOTE — PROGRESS NOTES
07/26/23 1200   Signing Clinician's Name / Credentials   Signing clinician's name / credentials Rama Gutierrez, PT, DPT, NCS   Functional Gait Assessment (PETERSON Bazzi, TORI Donahue., et al. (2004))   1. GAIT LEVEL SURFACE 3   2. CHANGE IN GAIT SPEED 3   3. GAIT WITH HORIZONTAL HEAD TURNS 3   4. GAIT WITH VERTICAL HEAD TURNS 2   5. GAIT AND PIVOT TURN 3   6. STEP OVER OBSTACLE 3   7. GAIT WITH NARROW BASE OF SUPPORT 2   8. GAIT WITH EYES CLOSED 3   9. AMBULATING BACKWARDS 3   10. STEPS 3   Total Functional Gait Assessment Score   TOTAL SCORE: (MAXIMUM SCORE 30) 28     Functional Gait Assessment (FGA): The FGA assesses postural stability during various walking tasks.   Gait assistive device used: none      Patient Score: 28/30 - Pt not at inc risk for falling; though can improve dynamic gait with activity practice. See pt's HEP.     Scores of <22/30 have been correlated with predicting falls in community-dwelling older adults according to Jluis & Syd 2010.   Scores of <18/30 have been correlated with increased risk for falls in patients with Parkinsons Disease according to Sergio Smyth Zhou et al 2014.  Minimal Detectable Change for patients with acute/chronic stroke = 4.2 according to Thijuliana & Ritschel 2009  Minimal Detectable Change for patients with vestibular disorder = 8 according to Jluis & Syd 2010     Assessment (rationale for performing, application to patient s function & care plan): s/p ALS with demonstrated balance impairments, repeated as has demonstrated improved gait, balance and overall functional mobility since initial assessment.

## 2023-07-26 NOTE — PROGRESS NOTES
07/26/23 0500   Appointment Info   Signing clinician's name / credentials Rama Gutierrez, PT, DPT, NCS   Total/Authorized Visits 3/6 in PT POC   Visits Used 3   Medical Diagnosis ALS   PT Tx Diagnosis impaired functional mobility   Progress Note/Certification   Start of Care Date 06/09/23   Onset of illness/injury or Date of Surgery 06/01/23  (Date of pt's dx of ALS - though, has had neurologic degeneration for a few months prior.)   Therapy Frequency 1x/week   Predicted Duration 60 days   GOALS   PT Goals 2;3;4   PT Goal 1   Goal Identifier MET: HEP   Goal Description Pt will demonstrate IND with strength, balance, and muscular and aerobic endurance HEP, while working to improve capacity for functional mobility.   Goal Progress 7/26/2023: Pt has demonstrated strong adherence to HEP; plan to d/c PT today, with transition to IND management with HEP.   Target Date 08/07/23   Date Met 07/26/23   PT Goal 2   Goal Identifier MET: 5xSTS   Goal Description Pt will complete x5 STS in <12s, without use of B UEs, to demonstrate typical B LE strength.   Goal Progress 7/26/2023: 11.23 from EOM (90/90 position) with no UEs. No dec of strength or motor recruitment in B LEs since initial PT session.   Target Date 08/07/23   Date Met 07/26/23   PT Goal 3   Goal Identifier MAINTAINED: FGA   Goal Description Pt will score 30pts on FGA, to demonstrate improved dynamic balance and postural control with ambulation, and decreased risk for falling with functional mobility.   Goal Progress 7/26/2023: Pt with 28/30 points on FGA; difficulty with backward ambulation and ambulation with vertical head turns. He's to continue both exercises as part of HEP. PT/pt both pleased with maintenence of performance, no loss.   Target Date 08/07/23   PT Goal 4   Goal Identifier MET: mCTSIB   Goal Description Pt will complete 30s for all conditions of mCTSIB to demonstrate improvement of sensory integration, in presence of ALS/high level balance  "difficulty.   Goal Progress 7/26/2023: Pt with no increased sway on mCTSIB; 30s, 30s, 30s, 30s. Pt with maintained (if not improved), vestibular sensory integration.   Target Date 08/07/23   Date Met 07/26/23   Subjective Report   Subjective Report Pt reporting his balance has been, \"so-so.\" He reports falling in the shower last Friday, he bumped into the windows and lost his balance. He hit is back and it was painful for a day-or-two. They are remodeling the bathroom with all safety features on Aug. 14th. He's continued his HEP for balance 4x/week. 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 He has numerous upcoming trips; [place], [place], and [place].   Treatment Interventions (PT)   Interventions Neuromuscular Re-education;Therapeutic Procedure/Exercise   Therapeutic Procedure/Exercise   Therapeutic Procedures: strength, endurance, ROM, flexibillity minutes (77752) 10   Skilled Intervention 5xSTS; pt edu   Patient Response/Progress Pt completed 5xSTS; see above, he continues to demonstrate typical strength of healthy adult. Discussed continued need for both aerobic and strengthening activities each day; however, to work at an intensity of </=3/10. He reports mowing the lawn is the most effortful task; reaching a 4-5/10. Discussed need for inc rest breaks and/or splitting lawn in 2-parts (ie. front yard on Monday, backyard on Tuesday). Again, pt reporting he's adhering to these recommendations. Pt's strong family support likely benefical for further carry-over.   Neuromuscular Re-education   Neuromuscular re-ed of mvmt, balance, coord, kinesthetic sense, posture, proprioception minutes (28970) 30   Skilled Intervention mCTSIB; FGA; static/dynamic balance; HEP   Patient Response/Progress Pt performed FGA and mCTSIB; see above/note attached. Pt with maintained, if not slightly improved static/dynamic balance and sensory integration. He continues to have difficulty with " tandem gait, backward ambulation, and gait with VHT. PT has revised pt's HEP to include tandem stance (not gait), gait with VHT, R/L SLS, backward ambualation, monster walking, and side-stepping. Pt in support. He's to continue balance program 4 days/week, as well as his typical daily ambulation outdoors. Pt in support/agreement. He feels confident with program.   Education   Learner/Method Patient;Demonstration;Pictures/Video;Listening   Plan   Home program HEP; see tx detail above.   Plan for next session N/A; discharged.   Total Session Time   Timed Code Treatment Minutes 40   Total Treatment Time (sum of timed and untimed services) 40         DISCHARGE  Reason for Discharge: Patient has met nearly all goals; see tx detail above.    Discharge Plan: Patient to continue home program.    Referring Provider:  Mayur Ruiz MD

## 2023-09-06 NOTE — PROGRESS NOTES
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 Reports dysphagia continues to be stable, using chin tuck with liquids.   SLP Goals   SLP Goals 3   SLP Goal 1   Goal Identifier LTG 2-Dysphagia   Goal Description Patient will consistently utilize the chin tuck and reduced rate of intake strategies to reduce frequency of overt s/sx of aspiration/penetration to <3x/day per Pt and family report.   Rationale To maximize safety, ease and/or independence of oral intake   Goal Progress 5/15/23: LTG 2-SLP provided copy of OP VFSS evaluation report and reviewed results and recommendations with Pt. All questions answered, Pt verbalized understanding with 100%.   Target Date 09/05/23   SLP Goal 2   Goal Identifier LTG 4-Exercises/Motor Speech   Goal Description Pt will complete recommended oral motor exercises and SOVT exercises to improve articulatory movement and pitch consistency in order to demonstrate an average of 80% intelligibility for 3+ syllable words in isolation, sentences, and conversation.   Rationale To maximize the ability to communicate wants and needs within the home or community   Target Date 09/05/23   Goal Progress 5/16/23: LTG-4-SLP trained Pt in diaphragmatic breathing with a focus on foward resonance to improve respiratory-phonatory mechanics and coordination  for improved intelligibility. Pt read aloud sentences with /w/ initial posiont=40% I, imrpvoed to 80% given min to mod cues and reps.   SLP Goal 3   Goal Identifier STG 1- AAC   Goal Description Pt will demonstrate use of AAC Speech Assistant zara on his iPad or iPhone in 80% of communication breakdowns to support daily communication of wants and needs.   Rationale To maximize the ability to communicate wants and needs within the home or community   Target Date 09/05/23   Treatment Interventions (SLP)   Treatment Interventions Treatment Speech/Lang/Voice   Treatment Swallow/Oral dysfunction   Treatment of Swallowing Dysfunction &/or Oral Function for Feeding Minutes (36299) 10 Minutes   Skilled Intervention Educated patient on swallowing strategies.;Demonstrated safe swallow strategies   Treatment Detail LTG 2-SLP provided verbal education on overt s/sx of aspiration and asso risks. Pt verbalized understanding and asserted desire that he does not want a g-tube, Pt reports weight has been stable with 1x/week checks. Pt endorsed and demonstrated use of chin tuck in 100% of opportunities for PO of thin liquids during this session, endorses use of apple sauce and chin tuck with medication intake and addition of sause with most foods/meals.   Treatment Speech/Lang/Voice   Treatment of Speech, Language, Voice Communication&/or Auditory Processing (69849) 30 Minutes   Skilled Intervention Provided feedback on performance of tasks;Modeled compensatory strategies;Demonstrated voice exercises   Patient Response/Progress New goal added for AAC as appropriate given new dx of ALS and francisco javier from [employer] association for zara on phone and tablet.   Treatment Detail HEP-ongoing device programming and learing of navigation, Pt reports designating 10 minutes/day which has been very helpful. Pt planning to try out recording feature on AAC zara. specifcally for weekly Twins games and Volley ball games. Pt reports volleyball players have given  him positive compliments weekly, completes vocal warm up with open vowel sounds on his way to the games following afternoons of vocal rest/conservation. STG-AAC-Pt reviewed device navigation with SLP and efficiently utilized the zara with 80% accuracy given min cues PRN.   Education   Learner/Method Patient;Listening;Demonstration   Plan   Home program AAC-device navigation practice and programming. Dysphagia-ongiong consistent use of strategies recommended.   Updates to plan of care Reduce to 2x/month.   Plan for next session AAC device navigation and programming.   Total Session Time   Total Treatment Time (sum of timed and untimed services) 40   General Information   Start Of Care Date 08/16/22         DISCHARGE  Reason for Discharge: Patient has met all goals.  No further expectation of progress.  Patient chooses to discontinue therapy.    Equipment Issued: none    Discharge Plan: Patient to continue home program.  Other services: Pt to follow with ALS clinic given new dx.    Referring Provider:  Hayley Kendrick

## 2023-09-07 NOTE — PROGRESS NOTES
CLINICAL NUTRITION SERVICES - BRIEF NOTE   (See RD note on 6/1 for full assessment)     Reason for RD note: Request from Dr. Carrasco to follow up regarding additional weight loss and pt will not be in clinic until Sept 28.     Corresponded with patient's spouse, Fátima, via phone. Fátima shared it takes a very long time for Esteban to eat and he gets very fatigued. In addition, his dysphagia has worsened and he is having more choking episodes. Fátima states Esteban is not receptive to a pureed diet and is a firm NO for a feeding tube. Encouraged Fátima to add additional calories and protein to the foods Esteban is comfortable eating. Fátima reports Esteban is drinking one [employer] protein shake per day. Recommended 2-3 protein shakes per day.     Nutrition will continue to follow per protocol.     Evelyn Lees, RD, LD

## 2023-09-27 NOTE — PROGRESS NOTES
ALS Care Coordination - Outreach Note    REASON FOR CONTACT:   Clinic Care Coordination - Follow-up (pre-visit)       ASSESSMENT:       9/27/2023   Since last visit   Key event: Hospitalized, skin breakdown, fall? None   Principle concerns: Any new? Not tolerating BiPAP - using for 0-2 hours. Increased weakness, has had several falls without injury and multiple near misses. Refusing assistive devices, general lack of safety awareness. Difficulty managing secretions, more difficulty swallowing.   Respiratory, sleep, energy symptoms: Any new? Difficulty concentrating;Sleep difficulty;Daytime fatigue;Shortness of breath while at rest;Shortness of breath with activity   Bulbar symptoms: Any new or worse? Excessive saliva;Difficulty swallowing   Communication: Any new issues? Difficulty speaking or difficulty being understood by others   Gastrostomy: Any concerns? Not applicable, do not have a gastrostomy   Gross motor and mobility: Any new concerns? Increased difficulty walking;Increased difficulty with transfers   Pain/discomfort: Any new symptoms? Muscle twitching;Muscle spasms/stiffness;Other musculoskeletal discomfort   Medication: Taking riluzole, Radicava, or Relyvrio? No, none of these   Medication: Have had to stop riluzole, Radicava, or Relyvrio? Riluzole   Medication: Taking Mucinex or Robinul? Yes   Medication: How much, how often? three times daily   Home care: Currently receiving services? No   Home care: Equipment used at home? BiPAP   Home care: [employer] equipment [employer]? [place]        PLAN:   - Patient to follow up in clinic as scheduled.      Josefa Campoverde LPN  ALS Neurology Care Coordinator  [facility] Neuroscience Service Line

## 2023-09-28 PROBLEM — Z78.9 IMPAIRED INSTRUMENTAL ACTIVITIES OF DAILY LIVING (IADL): Status: ACTIVE | Noted: 2023-01-01

## 2023-09-28 PROBLEM — Z78.9 IMPAIRED MOBILITY AND ADLS: Status: ACTIVE | Noted: 2023-01-01

## 2023-09-28 PROBLEM — Z74.09 IMPAIRED MOBILITY AND ADLS: Status: ACTIVE | Noted: 2023-01-01

## 2023-09-28 NOTE — PATIENT INSTRUCTIONS
Let's double your Robinul dose for the saliva, 2 mg three times a day  Please call me after 2 weeks- if drooling still bad we may think about Botox    You can use Mucinex for thick secretions.     You will see our PT and OT therapists today.     Follow up 3 months    We will schedule a palliative care appointment before you leave today

## 2023-09-28 NOTE — PROGRESS NOTES
Bernice Talavera MD  [place], September 28, 2023    Dear Dr. Talavera,    I had the pleasure to see Esteban Casillas in follow-up at the [facility].  He was escorted by his wife, who is a former nurse at [employer].  He is a 52-year-old male with [id number] repeat expansion positive, familial ALS, with bulbar onset of symptoms 2 years ago, and signs of frontotemporal dementia.  Compared to the last visit, his wife tells me that his behaviors are changing.  He is becoming more ritualistic and rigid with his daily routine.  Even minor changes in his routine, such as remodeling of the house they did recently, and moving the bed to a different bedroom, can disrupt him and cause anxiety.  The wife has not noticed any sexually inappropriate behaviors, or hyperorality.  He does put his clothes on and off however multiple times.  He will compulsively put food in his mouth or spit it at times.  He has difficulty following instructions.  He has not become agitated or aggressive, and his sleep is not disrupted.    His wife reports that his breathing is more shallow at night, and he is huffing and puffing with exertion.  He has already been prescribed BiPAP, but he will take it off around 2 to 3 AM when he wakes up, and he will not put it back.  He is becoming more impatient.  Regarding ALS medications, he was started on riluzole, but ended up stopping it a couple of weeks ago, because they mutually agreed that his priority is comfort care and not prolongation of life, and he was sometimes spitting the pills out and not keeping them down.    Mr. Casillas has significant dysphagia and dysarthria due to the ALS, as well as a hoarse voice.He is choking frequently on multiple food consistencies.  He has lost a few pounds of weight from his last visit. The patient and his spouse are adamant that they do not want a gastrostomy, and they have discussed this several  "times. Esteban's advanced directives have been completed, and he is DNR/DNI, and a POLST form is available.  I referred him to palliative care a month ago, but because the family was on a trip they had to cancel an appointment that was scheduled in early September.    He has marked sialorrhea and he has to use a napkin at all times, despite taking glycopyrrolate 1 mg 3 times a day.  He also has occasional thick phlegm accumulated at his throat.  According to the wife, there is no pseudobulbar affect.    He is using augmentative communication devices (such as his smart phone), but the communication is not very effective, because he is developing language problems with difficulty identifying words and letters.    His leg weakness is worse than the last visit, especially on the right, and he has developed a right foot drop.  He is having more difficulty walking.  He also has bilateral hand weakness, but mostly on the right.  The patient points to his atrophic right FDI muscle.  He cannot cut food without assistance, although he can generally brush his teeth.  He cannot use a pen on the right anymore.  He has to be held by his wife.    BP (!) 144/86 (BP Location: Right arm, Patient Position: Right side, Cuff Size: Adult Regular)   Pulse 82   Ht 1.918 m (6' 3.5\")   Wt 102.5 kg (226 lb)   SpO2 99%   BMI 27.88 kg/m      PFT testing was skipped.     Neuro exam was not repeated.     In summary, Mr. Casillas has frontotemporal dementia and ALS with positive [id number] repeat expansion.  Genetic counseling about this test result has already been offered, and they are aware that there is a 50% chance of passing this trait to his offsprings.  He is manifesting classic behaviors suggestive of FTD, and he has language impairment too (aphasia).  His wife does not believe that he is agitated, sexually inappropriate, or extremely disinhibited, therefore I do not see a reason to prescribe him neuroleptic drugs yet, but we can revisit " this decision in the future.    Since his priority is comfort care, we will not prescribe any riluzole, Relyvrio, Radicava.  As for the BiPAP, it also makes him uncomfortable to put it at night, therefore we will not force it.  He is clear that he does not want a gastrostomy.  I will increase his glycopyrrolate dose to 2 mg 3 times daily to address his sialorrhea and asked the family to call me in 2 weeks.  If this is not efficient we should consider botulinum toxin injections and salivary glands, although this may worsen his dysphagia, and she does not have a G-tube. Mucinex may also be helpful for thick secretions.    We will make sure we schedule an appointment with palliative care for the patient and his wife before he leaves clinic today.  Follow-up in 3 months, sooner if needed. TT spent on this encounter today 30 minutes of which 15 face to face, 10 in post visit note dictation, editing and orders, and 5 in pre-visit chart review.     Sincerely,    Guevara Carrasco MD, FAAN

## 2023-09-28 NOTE — LETTER
9/28/2023       RE: Esteban Casillas  [address] [place] [id number]     Dear Colleague,    Thank you for referring your patient, Esteban Casillas, to the [facility] [place] [facility]. Please see a copy of my visit note below.    Bernice Talavera MD  [place], September 28, 2023    Dear Dr. Talavera,    I had the pleasure to see Esteban Casillas in follow-up at the [facility] certified [facility] disease [facility].  He was escorted by his wife, who is a former nurse at [employer].  He is a 52-year-old male with [id number] repeat expansion positive, familial ALS, with bulbar onset of symptoms 2 years ago, and signs of frontotemporal dementia.  Compared to the last visit, his wife tells me that his behaviors are changing.  He is becoming more ritualistic and rigid with his daily routine.  Even minor changes in his routine, such as remodeling of the house they did recently, and moving the bed to a different bedroom, can disrupt him and cause anxiety.  The wife has not noticed any sexually inappropriate behaviors, or hyperorality.  He does put his clothes on and off however multiple times.  He will compulsively put food in his mouth or spit it at times.  He has difficulty following instructions.  He has not become agitated or aggressive, and his sleep is not disrupted.    His wife reports that his breathing is more shallow at night, and he is huffing and puffing with exertion.  He has already been prescribed BiPAP, but he will take it off around 2 to 3 AM when he wakes up, and he will not put it back.  He is becoming more impatient.  Regarding ALS medications, he was started on riluzole, but ended up stopping it a couple of weeks ago, because they mutually agreed that his priority is comfort care and not prolongation of life, and he was sometimes spitting the pills out and not keeping them down.      "Vinny has significant dysphagia and dysarthria due to the ALS, as well as a hoarse voice.He is choking frequently on multiple food consistencies.  He has lost a few pounds of weight from his last visit. The patient and his spouse are adamant that they do not want a gastrostomy, and they have discussed this several times. Esteban's advanced directives have been completed, and he is DNR/DNI, and a POLST form is available.  I referred him to palliative care a month ago, but because the family was on a trip they had to cancel an appointment that was scheduled in early September.    He has marked sialorrhea and he has to use a napkin at all times, despite taking glycopyrrolate 1 mg 3 times a day.  He also has occasional thick phlegm accumulated at his throat.  According to the wife, there is no pseudobulbar affect.    He is using augmentative communication devices (such as his smart phone), but the communication is not very effective, because he is developing language problems with difficulty identifying words and letters.    His leg weakness is worse than the last visit, especially on the right, and he has developed a right foot drop.  He is having more difficulty walking.  He also has bilateral hand weakness, but mostly on the right.  The patient points to his atrophic right FDI muscle.  He cannot cut food without assistance, although he can generally brush his teeth.  He cannot use a pen on the right anymore.  He has to be held by his wife.    BP (!) 144/86 (BP Location: Right arm, Patient Position: Right side, Cuff Size: Adult Regular)   Pulse 82   Ht 1.918 m (6' 3.5\")   Wt 102.5 kg (226 lb)   SpO2 99%   BMI 27.88 kg/m      PFT testing was skipped.     Neuro exam was not repeated.     In summary, Mr. Casillas has frontotemporal dementia and ALS with positive [id number] repeat expansion.  Genetic counseling about this test result has already been offered, and they are aware that there is a 50% chance of passing " this trait to his offsprings.  He is manifesting classic behaviors suggestive of FTD, and he has language impairment too (aphasia).  His wife does not believe that he is agitated, sexually inappropriate, or extremely disinhibited, therefore I do not see a reason to prescribe him neuroleptic drugs yet, but we can revisit this decision in the future.    Since his priority is comfort care, we will not prescribe any riluzole, Relyvrio, Radicava.  As for the BiPAP, it also makes him uncomfortable to put it at night, therefore we will not force it.  He is clear that he does not want a gastrostomy.  I will increase his glycopyrrolate dose to 2 mg 3 times daily to address his sialorrhea and asked the family to call me in 2 weeks.  If this is not efficient we should consider botulinum toxin injections and salivary glands, although this may worsen his dysphagia, and she does not have a G-tube. Mucinex may also be helpful for thick secretions.    We will make sure we schedule an appointment with palliative care for the patient and his wife before he leaves clinic today.  Follow-up in 3 months, sooner if needed. TT spent on this encounter today 30 minutes of which 15 face to face, 10 in post visit note dictation, editing and orders, and 5 in pre-visit chart review.         Again, thank you for allowing me to participate in the care of your patient.      Sincerely,    Guevara Carrasco MD

## 2023-09-28 NOTE — NURSING NOTE
Chief Complaint   Patient presents with    RECHECK       Vitals were taken and medications were reconciled.        Junior Young, Technician  10:19 AM  September 28, 2023

## 2023-09-28 NOTE — PROGRESS NOTES
"PHYSICAL THERAPY EVALUATION  Type of Visit: Evaluation    See electronic medical record for Abuse and Falls Screening details.    Subjective       Presenting condition or subjective complaint:  SOB with short distance walking ~2 blocks  Date of onset:      Relevant medical history:   Pt has onset of spastic dysarthria and dysphagia 12/2021, respiratory changes and reduced R hand strength and wt loss more recently; family hx of dementia   Dates & types of surgery:   R knee injury; s/p surgery     Prior diagnostic imaging/testing results:       Prior therapy history for the same diagnosis, illness or injury:        Prior Level of Function  Transfers: Independent  Ambulation: Independent  ADL: Independent  IADL: Driving, Work, Yard work    Living Environment  Social support:   lives with wife, Lupe, and 2 kids and 14 and 16 girl and boy   Type of home:   house, 1 1/2 story with basement  Stairs to enter the home:       2 steps with rail  Ramp:   no  Stairs inside the home:       full flight with basement  Help at home:  [employer], wife is a nurse, currently on FMLA but works online teaching.    Equipment owned:    none    Employment:     on disability now  Hobbies/Interests:   baseball, gardening    Patient goals for therapy:  to not fall    Pain assessment: Pain denied     Objective    NEURO CLINIC EVALUATION    Others present at visit: Spouse / significant other    Cardio-respiratory status: Forced vital capacity: 88 % of predicted (3 months ago)    Height/Weight: 6'3\"/ 226 lbs    Evaluation   Interview completed.     Range of motion: WFL       Manual muscle testing:  B ankle DF 3+/5, all other MMT grossly 5/5     Gait:  Patient ambulates with quick madhuri, slight decreased stride lengths, decreased heel strike/push off     Cognition:  Impaired, CBS 7/20 (cognitive)     5 times sit to stand: 17 seconds    Goals:   Target date: 9/28/2023  Patient, family and/or caregiver will verbalize understanding of evaluation " results and implications for functional performance.  Patient, family and/or caregiver will verbalize/demonstrate understanding of compensatory methods /equipment to enhance functional independence and safety.  Patient, family and/or caregiver will verbalize/demonstrate understanding of home program.  Patient, family and/or caregiver will verbalize/demonstrate understanding of positioning techniques/equipment.  Patient, family and/or caregiver will verbalize energy management techniques appropriate for status and setting.    Educational assessment/barriers to learning:   No barriers noted   Recommended Interventions: ADL/Self Care Management    Treatment provided this date:   ADL/self-care management-10 minutes  -Collaborated with patient and wife on safety with mobility including possibility of assistive device, however patient's wife and patient reporting will not use due to cognitive deficits.  PT recommended patient's wife set up home environment clutter free, safety-use barriers as needed. Recommended 24 hour supervision for indoor mobility, assist for balance when outdoors  -Collaborated on use of chair risers that are placed underneath chair in order to improve sit to stand safety and quality.  Offered patient's wife a few options to trial at home    Response to treatment/recommendations: Patient verbalizes understanding and agreement    Goal attainment:  All goals met    Total Evaluation Time (Minutes): 15  Timed Code Treatment Minutes: 10  Total Treatment Time (sum of timed and untimed services): 25    Assessment & Plan   CLINICAL IMPRESSIONS  Medical Diagnosis: ALS    Treatment Diagnosis: imbalance   Impression/Assessment: Patient is a 52 year old male with balance and cognitive complaints.  The following significant findings have been identified: Impaired balance, Decreased activity tolerance, and Instability. These impairments interfere with their ability to perform self care tasks, work tasks,  recreational activities, household chores, driving , household mobility, and community mobility as compared to previous level of function.     Clinical Decision Making (Complexity):  Clinical Presentation: Evolving/Changing  Clinical Presentation Rationale: based on medical and personal factors listed in PT evaluation  Clinical Decision Making (Complexity): Moderate complexity    PLAN OF CARE      Frequency of Treatment: 1 time  Duration of Treatment: 1 day    Risks and benefits of evaluation/treatment have been explained.   Patient/Family/caregiver agrees with Plan of Care.     Evaluation Time:        Signing Clinician: Debby Drake, PT,DPT

## 2023-09-28 NOTE — PROGRESS NOTES
OCCUPATIONAL THERAPY EVALUATION  Type of Visit: Evaluation    See electronic medical record for Abuse and Falls Screening details.    Subjective      Presenting condition or subjective complaint:  wife concerned about cognitive safety  Date of onset:   9/28/23   Relevant medical history:   Pt has onset of spastic dysarthria and dysphagia 12/2021, respiratory changes and reduced R hand strength and wt loss more recently; family hx of dementia   Dates & types of surgery:  R knee injury; s/p surgery    Prior diagnostic imaging/testing results:     see chart  Prior therapy history for the same diagnosis, illness or injury:    OT for R knee inpatient in past    Prior Level of Function   Transfers: Independent  Ambulation: Independent  ADL: Independent  IADL: Childcare, Driving, Finances, Housekeeping, Laundry, Meal preparation, Medication management, Work, Yard work    Living Environment  Social support:   lives with wife, Lupe, and 2 kids and 14 and 16 girl and boy  Type of home:   house[place], 1 1/2 story with basement  Stairs to enter the home:       3 steps in with rail  Ramp:   no  Bathroom set up: toilet higher in basement and walk-in shower; main level tub shower and low toilet  Stairs inside the home:       full flight with basement  Help at home:  Lupe, wife is a nurse, currently on FMLA but works online teaching.   Equipment owned:    Has a shower chair but not currently using it. Remodeling their bathroom w/ roll in shower, fold down shower bench, higher toilet w/ placement of grab bars which should be ready in 3 weeks per wife. See further equipment recommended below.     Employment:    sales on disability now, some   Hobbies/Interests:  [employer] season tickets, watches tv, gardening    Patient goals for therapy:  Per wife, goal is to keep pt safe due to his decreasing cognition and impulsiveness.     Pain assessment: Pain denied     Objective      NEURO CLINIC EVALUATION    Others  present at visit: Spouse / significant other, Ruth    Cardio-respiratory status: BiPAP  Forced vital capacity: 88 % of predicted MIP-34- last visit not tested today.    Height/Weight: Data Unavailable / 0 lbs 0 oz    Technology used: smartphone    ALS FRS: ALS FRS-R (ALS Functional Rating Scale-Revised) completed today. Please see separate note.     Evaluation   Interview completed.     Range of motion:  UE AROM is full    Manual muscle testing: notable R lateral and palmar pinch weakness compared to L  R hand dominant    Cognitive screening comments/detail:  Did not complete today but was agreeable to discuss plan today. Wife reports concerns w/ his safety awareness and poor decision making w/ using oven w/o assistance or watching same videos on repeat. Pt is restless and moves around on his own but getting more difficulty w/ mobility per wife at home. Pt involved with conversation nods heads/shakes head and uses his smartphone to express single words when unable to express due to dysarthria.       ADL:   Feeding self:  Needing assist  Grooming: Pt likes a routine and wife recommending an electric shaver but pt is refusing. Pt using manual razor but wife reports limited by hand weakness.   UE Dressing:  Small buttons on wrist cuff  LE Dressing:  Tying shoes harder  Showering/bathing: Needs A from wife  Toileting/transfer:  Supervision, strips his clothes and showers for toileting hygiene  Functional Mobility: Needs variable A from wife, pt often getting up w/o assistance     IADL:   Home management:  Pt can assist w/ putting dishes away in , needing assist w/ cooking/taking things out of the oven, needs A w/ laundry due to cognitive and mobility safety  Driving:  No longer driving. Wife reports plan is for pt to stop mowing lawn and using power hand tools.   Occupation: disabled  Leisure: [employer], plays gin online  Emergency Call system: smartphone       Recommended Interventions: self care/home  management    Treatment provided this date:   Self care/home management, 37 minutes of training in:  - Equipment to be received from [facility] : gait belt for safety w/ transfers and ease w/ physical assistance. Bed rail (wife preference)for increased ease w/ bed mobility from supine to sit verse ceiling pole and chair/sofa rail, and built up foam for increasing ease w/ self feeding tasks.  -Offered sip and puff as alternative to BiPAP but wife thinks this would be hard for pt to learn, chair/bed emergency/fall prevention alarm pads, Canary camera for monitoring pt's transfers/impulsiveness, wife declined at this time as she felt it could increase his agitation and doesn't feel she needs the cameras at this time  -Offered leisure activities/options pt can do on his own such as Wii or seated exercise videos to prevent restlessness.    -Offered respite care to caregiver Lupe if needed, but she reports he is sometimes home alone w/ supervision of teenage daughters when she is not present. She is aware of respite program through [facility]  -Offered safety awareness/signs in kitchen such as not to use oven but pt likely not to be compliant w/ signs per wife.  -Custom Thumb splint for R hand - per wife pt had splint made but has not been compliant w/ it's use.  Barriers: impulsive, restlessness, per wife pt likes his routine, and refuses adaptive equipment.    Response to treatment/recommendations: receptive but pt with decreased insight to his impairments. Wife has good awareness of his decreased safety awareness, impulsiveness and decreased insight.     Goal attainment:  All goals met    Total Evaluation Time (Minutes): 12   Timed Code Treatment Minutes: 25  Total Treatment Time (sum of timed and untimed services): 37 mins    Assessment & Plan   CLINICAL IMPRESSIONS   Medical Diagnosis:   ALS   Treatment Diagnosis:   Impaired ADL/IADL   Impression/Assessment: Pt is a 52 year old male presenting to Occupational Therapy due to  ALS.  The following significant findings have been identified: Impaired activity tolerance, Impaired balance, Impaired cognition, Impaired communication, Impaired coordination, Impaired strength and fatigue  with respiratory compromise.  These identified deficits interfere with their ability to perform work tasks, recreational activities, household chores, driving , community mobility, medication management, financial management,  yard work, care of others, meal planning and preparation and community or volunteer activities as compared to previous level of function.     Clinical Decision Making (Complexity):   Assessment of Occupational Performance: 3-5 Performance Deficits  Occupational Performance Limitations: driving and community mobility, health management and maintenance, home establishment and management, meal preparation and cleanup, shopping, work, volunteer activities and child rearing.  Clinical Decision Making (Complexity): Moderate complexity    PLAN OF CARE  Treatment Interventions:   Interventions: Self-Care/Home Management    Long Term Goals   Target date goal: 9/28/23  Caregiver will demonstrate understanding w/ use of AE for increasing ease w/ self feeding and decreasing caregiver burden.   Pt will demonstrate understanding of AE to aid in safety w/ transfers/positioning during ADLs.  Pt's caregiver will demonstrate understanding w/ strategies to keep pt safe in his environment and due to impulsive behaviors.       Frequency of Treatment:  1 time only   Duration of Treatment:   37 mins    Recommended Referrals to Other Professionals: Occupational Therapy-outpatient for CPT  Education Assessment:  pt and wife receptive     Risks and benefits of evaluation/treatment have been explained.   Patient/Family/caregiver agrees with Plan of Care.            Signing Clinician: Yamilet Carlisle, OTR

## 2023-10-13 NOTE — TELEPHONE ENCOUNTER
Central Prior Authorization Team   Phone: [phone]    PA Initiation    Medication: MORPHINE SULFATE (CONCENTRATE) 20 MG/ML PO SOLN  Insurance Company: [employer] - Phone [phone] Fax [phone]  Pharmacy Filling the Rx: [id number] [place], [place] - [address]  Filling Pharmacy Phone: [phone]  Filling Pharmacy Fax:    Start Date: 10/13/2023

## 2023-10-13 NOTE — TELEPHONE ENCOUNTER
Prior Authorization Approval    Medication: MORPHINE SULFATE (CONCENTRATE) 20 MG/ML PO SOLN  Authorization Effective Date: 10/13/2023  Authorization Expiration Date: 10/13/2024  Approved Dose/Quantity:   Reference #:     Insurance Company: [employer] [phone] Fax [phone]  Expected CoPay: $    CoPay Card Available:      Financial Assistance Needed:   Which Pharmacy is filling the prescription: [id number] [place], [place] - [address]  Pharmacy Notified: Yes  Patient Notified: Yes

## 2023-10-13 NOTE — TELEPHONE ENCOUNTER
Received [employer] message from patient requesting refill of mirtazapine.     Last refill: Has not been filled yet  Last office visit: 10/13/2023  Scheduled for follow up enrolling in hospice    Will route request to MD for review.     Reviewed [place]  Report.

## 2023-10-13 NOTE — PATIENT INSTRUCTIONS
Thank you for meeting with us in the [facility].    How to get a hold of us:  For non-urgent matters, MyChart works best.    For more urgent matters, or if you prefer not to use MyChart, call our clinic nurse Lorrie Lazar at [phone].     We have an on-call number for evenings and weekends. Please call this only if you are having uncontrolled symptoms or serious side effects from your medicines: [phone].     For refills, please give us a week (5 working days) notice. We don't always have providers available everyday to do refills. If you call the day you run out of your medicine, we may not be able to refill it in time, so call 5 days in advance!      Constipation and Opioid (Pain Medicine) Use    Constipation is when you are not able to have a bowel movement (BM) for several days. Constipation can also mean that stools that are hard or difficult to pass without straining and pushing. Constipation is a common problem and the reason many people are admitted to the hospital. Even if you are not eating, you still need to have a bowel movement at least every other day.      Taking opioids (pain medicine) will make you constipated. This problem will not go away as long as you are taking opioids. Common opioids which are prescribed are: hydrocodone (Vicodin , Norco , Lortab ); morphine (MSContin , MSIR ); oxycodone (OxyContin , Percocet , OxyIR , Roxicodone); hydromorphone (Dilaudid , Exalgo ); fentanyl (Duragesic ); methadone.    What can I do to avoid constipation?  Most people taking opioids need to take laxatives every day to prevent constipation.  See the guide below for using laxatives.       Step Medicine Directions     1.  You should start taking these medicines the same day you start taking opioids (pain medication).    Senna 8.6mg tablets (over the counter medication - you do not need a prescription).  Also known as Senokot or Ex-lax 1-2 tablets twice a day. May increase up  to 4 tablets twice a day if needed for a daily bowel movement and may need to decrease if your bowels become loose.   2.  If you have not had a bowel movement in 48 hours, CALL YOUR DOCTOR. Your doctor may tell you to use one of the medicines listed below:    Miralax (polyethylene glycol) Over the counter medication - you do not need a prescription.  17g dissolved in 4-8oz of liquid once or twice a day. Again, may need to increase to twice a day if not having a daily bowel movement or decrease if stools become loose.    Milk of Magnesia (Over the counter medication - you do not need a prescription.) 1-2 tablespoons once a day           When should I call my doctor?  Call your doctor if you have not had a bowel movement 24 hours after following the above steps.    You should also call your doctor if you have any of the following symptoms:  Watery stools (this can be a sign of severe constipation or too much bowel medicine)  No bowel movement in 48 hours   Small stools; Hard stools  Pain    Special Instructions:    _______________________________________________________________    _______________________________________________________________    _______________________________________________________________    _______________________________________________________________    _______________________________________________________________    _______________________________________________________________    _______________________________________________________________    _______________________________________________________________    _______________________________________________________________        [facility]   The [facility] Palliative Care Team is committed to treating your pain and other symptoms. This handout is for all patients treated with opioid medications in our clinics.     What are opioid medicines?   Opioid medications are used to  ease some types of pain and shortness of breath. They are sometimes called narcotics. They include: Morphine (MS Contin, Roxanol), Oxycodone (OxyContin, OxyFast, Percocet), Hydrocodone (Vicodin, Norco), Hydromorphone (Dilaudid), Fentanyl (Duragesic), Methadone (Dolophine).   Are opioid medicines safe to take?   Opioid medicines are safe when you follow the directions from your doctor or medical provider.  Opioids can cause serious side effects and become unsafe if you do not follow your instructions.   To make sure you are taking opioid medicines safely, we may ask you to bring in your pill bottles or to allow us to test your urine. Our goal is to keep you safe and to improve your ability to function & be active. If we don t think opioids are safely doing that, we will work with you to taper you off of them.   Do not drive unless your medical provider tells you it is safe.   Do not take opioids with alcohol or anxiety/sleep medicines unless your doctor tells you it is ok to do so.   Are opioids addictive?   Addiction means you crave a drug and have trouble limiting the amount you use.   Addiction is more likely to happen if you take opioids to relieve stress or to feel altered. If you or your loved one feels this way when taking opioid medicines, let your medical provider know. We may refer you for additional assessment or services if this is a concern.   Your body will get used to the opioid medicines if you take them for more than two weeks in a row. This means if you stop them suddenly, you may have withdrawal. If this occurs, you will feel uncomfortable (nausea, diarrhea, increased pain). This does not mean you are addicted. Never stop taking your pain medicines all at once unless your medical provider tells you to. If you think you need less medicine, your medical provider will help you to safely lower your dose.   What is the safest way to store opioids?   Keep your medicines in a safe place away from  children, teens, pets, and visitors. Be careful about who knows that you have these medicines.   How do I get rid of my old opioids?   Some pharmacies or counties (for example, [facility]) have drop boxes for disposal, or you can purchase a disposal kit from your local pharmacy. If neither of these options is available, the Food and Drug Administration recommends that you flush unused opioids down the toilet.   Do not share or sell your pain medicines. This is illegal and very dangerous. We cannot prescribe opioid pain medicines to you if you do this.   How do I get refills?   Opioid medicine prescriptions have special safety features. This means it may take longer to refill than other medicines. Our clinic cannot prescribe them on weekends or at night.     Give us one week s notice when requesting a refill. This gives us the time we need to get it signed and back to you. It may be possible to mail prescriptions to you.

## 2023-10-13 NOTE — TELEPHONE ENCOUNTER
Hospice referral, facesheet and last note sent to [facility].    Lorrie Lazar, RN  Palliative Care Nurse Clinician    [phone] (Direct)  [phone] (Main)  [phone] (Appointment Scheduling)

## 2023-10-13 NOTE — PROGRESS NOTES
Palliative Care Outpatient Clinic      Patient ID:  Medical - He has familial, bulbar-onset ALS dx summer 2021 and now associated frontotemporal dementia. Took riluzole for a couple months and stopped; is not interested in disease modifying therapy further.     Social - Lives with wife Fátima who's an ICU RN at [employer]; 2 teenage step sons at home.     Care Planning - +HCD on file; Fátima is his primary surrogate decision maker  DNR/DNI POLST on file. He has also been clear about no G tube.   We discussed and I recommended hospice enrollment 10/2023; he wants comfort focused care, would not eg want to go to the hospital for pneumonia treatment if he was failing outpatient abx, etc.     History:  History gathered today from: patient, family/loved ones, medical chart  Fátima is with him  Hx reviewed in chart and confirmed with them.  He has bulbar onset ALS, worsening dysphagia (elisha thin liquids and now dry solids eg breads), diaphragmatic insufficiency and now dyspnea with minimal exertion (walking 20ft to bathroom; getting dressed); progressive cognitive changes c/w FTD from ALS; weight loss; more coughing with eating. Motor weakness is worsening; he's ambulatory but falling more.     Also worsening general anxiety--worry, loss, grief. Acute anxiety associated with dyspnea too. On 20 mg escitalopram. Takes hydroxyzine at night but it's too sedating to take during the day.    He has NIPPV but does not tolerate it. Wears it 20-60 min at a time, here and there. Not clearly getting any benefit from it given such low use. Doesn't easily tolerate it and doesn't well understand why it's helpful and that's also been why he doesn't wear it.    He has been clear and consistent about not wanting a G tube and overall wanting a comfort-focused approach to his care, this predated his FTD diagnosis. He still wants that.     PE: There were no vitals taken for this visit.   Wt Readings from Last 3 Encounters:   09/28/23 102.5 kg (226  lb)   06/01/23 106.2 kg (234 lb 3.2 oz)   04/26/23 110.2 kg (243 lb)     Alert NAD  Dyarthric speech about 25% intelligible; uses a text to speeech zara      Data reviewed:  I reviewed recent labs and imaging, my comments:  VSS deep penetration of barium to VF  Brain MRI unremarkable May    PFTS MIP -34     database reviewed: y      Impression & Recommendations:  53 yo with bulbar onset ALS causing dysphagia, weight loss, diaphragmatic insufficiency, weakness, FTD    Anxiety  Add mirtazapine to 20 mg escitalopram; will use ODT 15 mg    Dyspnea  We discussed benefits and downsides of low dose opioids.   We'll try morphine 5-10 mg, elixir, ok to use sublingual if they want  Q4h prn; if he's using it consistently and if insurance covers we could try Berenice  They have bowel meds at home    Care planning  They present as very well informed about his illness and accepting of its progressive, terminal nature.  His goals are clearly focused on comfort and he'd forego some longevity to avoid being in the hospital.  He's losing weight, aspirating, and has dyspnea with minimal exertion, and is not on any disease modifying therapy for ALS. I think he qualifies for hospice, and his care goals will be well-met by hospice enrollment. D/w them what hospice is and does. They are interested in learning more; I'm not sure if he's ready to enroll quite yet which is fine. I placed a hospice order -- I hope he enrolls but may wait for a while which is fine too.     Asked them to update me about the morphine  If he doesn't enroll in hospice and wants to continue morphine I am happy to do that but let them know I need to see Esteban at least q3 months then.      Over 70 minutes spent on the date of the encounter doing chart review, history and exam, patient education & counseling, documentation and other activities as noted above.    Thank you for involving us in the patient's care.   Williams Abraham MD / Palliative Medicine / Text me via  [employer]ian  This note may have been composed with voice recognition software and there may be mistranscriptions.

## 2023-10-13 NOTE — LETTER
10/13/2023       RE: Esteban Casillas  [address] [facility] [address]     Dear Colleague,    Thank you for referring your patient, Esteban Casillas, to the [facility] at [facility]. Please see a copy of my visit note below.    Palliative Care Outpatient Clinic      Patient ID:  Medical - He has familial, bulbar-onset ALS dx summer 2021 and now associated frontotemporal dementia. Took riluzole for a couple months and stopped; is not interested in disease modifying therapy further.     Social - Lives with wife Fátima who's an ICU RN at [employer]; 2 teenage step sons at home.     Care Planning - +HCD on file; Fátima is his primary surrogate decision maker  DNR/DNI POLST on file. He has also been clear about no G tube.   We discussed and I recommended hospice enrollment 10/2023; he wants comfort focused care, would not eg want to go to the hospital for pneumonia treatment if he was failing outpatient abx, etc.     History:  History gathered today from: patient, family/loved ones, medical chart  Fátima is with him  Hx reviewed in chart and confirmed with them.  He has bulbar onset ALS, worsening dysphagia (elisha thin liquids and now dry solids eg breads), diaphragmatic insufficiency and now dyspnea with minimal exertion (walking 20ft to bathroom; getting dressed); progressive cognitive changes c/w FTD from ALS; weight loss; more coughing with eating. Motor weakness is worsening; he's ambulatory but falling more.     Also worsening general anxiety--worry, loss, grief. Acute anxiety associated with dyspnea too. On 20 mg escitalopram. Takes hydroxyzine at night but it's too sedating to take during the day.    He has NIPPV but does not tolerate it. Wears it 20-60 min at a time, here and there. Not clearly getting any benefit from it given such low use. Doesn't easily tolerate it and doesn't well understand why it's helpful and that's also been  why he doesn't wear it.    He has been clear and consistent about not wanting a G tube and overall wanting a comfort-focused approach to his care, this predated his FTD diagnosis. He still wants that.     PE: There were no vitals taken for this visit.   Wt Readings from Last 3 Encounters:   09/28/23 102.5 kg (226 lb)   06/01/23 106.2 kg (234 lb 3.2 oz)   04/26/23 110.2 kg (243 lb)     Alert NAD  Dyarthric speech about 25% intelligible; uses a text to speeech zara      Data reviewed:  I reviewed recent labs and imaging, my comments:  VSS deep penetration of barium to VF  Brain MRI unremarkable May    PFTS MIP -34     database reviewed: y      Impression & Recommendations:  51 yo with bulbar onset ALS causing dysphagia, weight loss, diaphragmatic insufficiency, weakness, FTD    Anxiety  Add mirtazapine to 20 mg escitalopram; will use ODT 15 mg    Dyspnea  We discussed benefits and downsides of low dose opioids.   We'll try morphine 5-10 mg, elixir, ok to use sublingual if they want  Q4h prn; if he's using it consistently and if insurance covers we could try Berenice  They have bowel meds at home    Care planning  They present as very well informed about his illness and accepting of its progressive, terminal nature.  His goals are clearly focused on comfort and he'd forego some longevity to avoid being in the hospital.  He's losing weight, aspirating, and has dyspnea with minimal exertion, and is not on any disease modifying therapy for ALS. I think he qualifies for hospice, and his care goals will be well-met by hospice enrollment. D/w them what hospice is and does. They are interested in learning more; I'm not sure if he's ready to enroll quite yet which is fine. I placed a hospice order -- I hope he enrolls but may wait for a while which is fine too.     Asked them to update me about the morphine  If he doesn't enroll in hospice and wants to continue morphine I am happy to do that but let them know I need to see  Esteban at least q3 months then.      Over 70 minutes spent on the date of the encounter doing chart review, history and exam, patient education & counseling, documentation and other activities as noted above.    Thank you for involving us in the patient's care.   Williams Abraham MD / Palliative Medicine / Text me via [employer]  This note may have been composed with voice recognition software and there may be mistranscriptions.        Again, thank you for allowing me to participate in the care of your patient.      Sincerely,    Williams Abraham MD

## 2023-10-13 NOTE — NURSING NOTE
"Oncology Rooming Note    October 13, 2023 8:07 AM   Esteban Casillas is a 52 year old male who presents for:    Chief Complaint   Patient presents with    Oncology Clinic Visit     [facility] RETURN - ALS     Initial Vitals: BP (!) 152/100 (BP Location: Right arm, Patient Position: Chair, Cuff Size: Adult Large)   Pulse 77   Temp 98  F (36.7  C)   Resp 18   Ht 1.918 m (6' 3.51\")   Wt 101.2 kg (223 lb 1.6 oz)   SpO2 99%   BMI 27.51 kg/m   Estimated body mass index is 27.51 kg/m  as calculated from the following:    Height as of this encounter: 1.918 m (6' 3.51\").    Weight as of this encounter: 101.2 kg (223 lb 1.6 oz). Body surface area is 2.32 meters squared.  No Pain (0) Comment: Data Unavailable   No LMP for male patient.  Allergies reviewed: Yes  Medications reviewed: Yes    Medications: Medication refills not needed today.  Pharmacy name entered into [employer]: [id number] - [place], [place] - [address]              "

## 2023-10-13 NOTE — TELEPHONE ENCOUNTER
Received MyChart message from prior [facility] specialist that patient requesting morphine prescription be sent to [facility] [address] in [place] because the original pharmacy was unable to fill.    Last refill: N/A  Last office visit: 10/13/2023  Scheduled for follow up enrolling in hospice    Will route request to MD for review.     Reviewed [place]  Report.

## 2023-10-19 NOTE — PROGRESS NOTES
Encounter Date: 10/19/2023    SCRIBE #1 NOTE: I, Abena Lincoln, am scribing for, and in the presence of,  AVTAR Caruso. I have scribed the following portions of the note - Other sections scribed: HPI, ROS, PE.       History     Chief Complaint   Patient presents with    Flank Pain     47 yo male to triage for right sided flank pain and vomiting that started suddenly this morning. Denies dysuria and diarrhea. VSS, NAD, AAOx4 Rates pain 10/10    Vomiting     This is a 48 year old male with a PMHx of renal calculi, who presents to the ED complaining of Right Sided Flank Pain, onset this morning. Patient reports emesis (1x), nausea, and abdominal pain. Patient states having a renal calculi two years ago that he presented to the ED to pass and that his current pain is similar. No other alleviating or exacerbating factors. Patient attempted treatment with Mylanta. This is the extent of the patient's complaints in the ED. NKDA.               The history is provided by the patient. No  was used.     Review of patient's allergies indicates:  No Known Allergies  Past Medical History:   Diagnosis Date    Kidney stones      History reviewed. No pertinent surgical history.  History reviewed. No pertinent family history.  Social History     Tobacco Use    Smoking status: Every Day   Substance Use Topics    Alcohol use: No     Review of Systems   Constitutional:  Negative for chills, fatigue and fever.   HENT:  Negative for congestion, sinus pressure, sinus pain, sneezing and sore throat.    Eyes:  Negative for visual disturbance.   Respiratory:  Negative for cough and shortness of breath.    Cardiovascular:  Negative for chest pain.   Gastrointestinal:  Positive for abdominal pain, nausea and vomiting (1x).   Genitourinary:  Positive for flank pain (Right-Sided). Negative for difficulty urinating, dysuria and hematuria.   Musculoskeletal:  Negative for back pain.   Skin:  Negative for rash.   Neurological:  Social Work -ALS Clinic Progress Note  [facility]    Data/Intervention:    Patient Name:  Esteban Casillas  /Age:  1970 (52 year old)    Visit Type: in person    Collaborated With:    -Brittny Shine  -Dr Carrasco and members of the ALS interdisciplinary team    Psychosocial info/Concerns:   Touched base with Esteban and Fátima. They recently returned from a vacation. It was taxing for Fátima but she did it for him. She is on a FMLA to care for Esteban and her supervisor has indicated she can extend it as needed. Esteban's FTD is apparent with behaviors and language problems that are challenging but manageable now primarily by re-directing. She is aware that medications are available if needed.     Intervention/Education/Resources Provided:  Reviewed current situation and challenges.  Discussed getting a Medicare plan when he starts Medicare in Dec. She will contact me closer to that time. She will make decisions based upon his status at that time. Fátima and their kids will need insurance too if she extends her RADHA. Discussed using [employer].   I emailed her links to a online ftd seminar and a link about managing ftd.   He doesn't want a gtube or to use a bipap. They are taking a palliative approach and are open to hospice when that is appropriate.     Assessment/Plan:  Provided information and support. Remain available.     Provided patient/family with contact information and encouraged them to contact me between clinic visits as needed.     MARLENI Reza, [facility]    [facility]  [phone]         Negative for syncope and headaches.       Physical Exam     Initial Vitals [10/19/23 1018]   BP Pulse Resp Temp SpO2   (!) 147/89 70 (!) 21 98.5 °F (36.9 °C) 100 %      MAP       --         Physical Exam    Nursing note and vitals reviewed.  Constitutional: He appears well-developed and well-nourished. He is not diaphoretic. No distress.   HENT:   Head: Normocephalic and atraumatic.   Right Ear: External ear normal.   Left Ear: External ear normal.   Nose: Nose normal.   Cardiovascular:  Normal rate and regular rhythm.           Pulmonary/Chest: Breath sounds normal. No respiratory distress.   Abdominal: Abdomen is soft. Bowel sounds are normal. He exhibits no distension. There is abdominal tenderness in the right upper quadrant and right lower quadrant.   There is right CVA tenderness.  No left CVA tenderness.   Musculoskeletal:         General: No edema.     Neurological: He is alert and oriented to person, place, and time. GCS score is 15. GCS eye subscore is 4. GCS verbal subscore is 5. GCS motor subscore is 6.   Skin: Skin is warm and dry.   Psychiatric: He has a normal mood and affect. His behavior is normal. Judgment normal.         ED Course   Procedures  Labs Reviewed   URINALYSIS, REFLEX TO URINE CULTURE - Abnormal; Notable for the following components:       Result Value    Protein, UA 1+ (*)     Occult Blood UA 3+ (*)     All other components within normal limits    Narrative:     Specimen Source->Urine   COMPREHENSIVE METABOLIC PANEL - Abnormal; Notable for the following components:    Anion Gap 6 (*)     All other components within normal limits   URINALYSIS MICROSCOPIC - Abnormal; Notable for the following components:    RBC, UA >100 (*)     All other components within normal limits    Narrative:     Specimen Source->Urine   CBC W/ AUTO DIFFERENTIAL          Imaging Results              CT Renal Stone Study ABD Pelvis WO (Final result)  Result time 10/19/23 11:34:41      Final result by Montrell  Adi MARTIN MD (10/19/23 11:34:41)                   Impression:      7 mm stone at the level of the right ureteropelvic junction.  No significant hydronephrosis is visualized.    Bilateral nonobstructing renal stones.    1.2 cm exophytic lesion in the inferior pole of the left kidney.  This demonstrates attenuation higher than simple cyst and dedicated, nonemergent retroperitoneal ultrasound is recommended.      Electronically signed by: Adi Stock MD  Date:    10/19/2023  Time:    11:34               Narrative:    EXAMINATION:  CT RENAL STONE STUDY ABD PELVIS WO    CLINICAL HISTORY:  Flank pain, kidney stone suspected;    TECHNIQUE:  Low dose axial images, sagittal and coronal reformations were obtained from the lung bases to the pubic symphysis.  Contrast was not administered.    COMPARISON:  CT abdomen pelvis from 01/15/2023    FINDINGS:  Heart: No pericardial effusion.    Lung Bases: Well aerated, without consolidation or pleural fluid.    Liver: Normal in size and attenuation, with no focal hepatic lesions.    Gallbladder: No calcified gallstones.    Bile Ducts: No evidence of dilated ducts.    Pancreas: No mass or peripancreatic fat stranding.    Spleen: Unremarkable.    Adrenals: Unremarkable.    Kidneys/ Ureters: Right kidney demonstrates at least 3 nonobstructing stones measuring 2 mm, 4 mm, and 3 mm.  There is an additional stone measuring 7 x 5 by 7 mm at the ureteropelvic junction.  The left kidney demonstrates at least 2 nonobstructing stones measuring up to 4 mm.    The inferior pole of the left kidney demonstrates a 1.2 cm exophytic lesion demonstrating attenuation higher than simple cyst (series 2, image 80).  This is similar compared to prior exam.  Simple cyst in the midpole of the left kidney.    Bladder: No evidence of wall thickening.    Reproductive organs: Unremarkable.    GI Tract/Mesentery: No evidence of bowel obstruction or inflammation.  No evidence of inflammatory process in the  right lower quadrant.    Lymph nodes: No lymphadenopathy.    Vasculature: No aneurysm.    Abdominal wall: Unremarkable.    Bones: No acute fracture.                                       Medications   ketorolac injection 15 mg (15 mg Intravenous Given 10/19/23 1107)   ondansetron injection 8 mg (8 mg Intravenous Given 10/19/23 1107)   sodium chloride 0.9% bolus 1,000 mL 1,000 mL (0 mLs Intravenous Stopped 10/19/23 1239)     Medical Decision Making  This is a 48 year old male with a PMHx of renal calculi, who presents to the ED complaining of Right Sided Flank Pain, onset this morning. Patient reports emesis (1x), nausea, and abdominal pain. Patient states having a renal calculi two years ago that he presented to the ED to pass and that his current pain is similar. No other alleviating or exacerbating factors. Patient attempted treatment with Mylanta. This is the extent of the patient's complaints in the ED. NKDA. On physical exam patient has right-sided CVA tenderness.  He also has right upper quadrant and right lower quadrant tenderness to palpation.  He was hypertensive at 147/89.  Otherwise reassuring vital signs.  Patient initially treated with IV Zofran, Toradol and fluids.  CBC and CMP within normal limits.  Urinalysis positive for blood, negative for nitrites and leukocytes.  CT scan renal stone protocol ordered in his positive for yeast 7 mm stone at the right ureter pelvic junction with no significant hydronephrosis.  The patient also has bilateral nonobstructing renal stones on along with a cyst in the kidney.  Reviewed findings with patient.  We discussed that 7 mm is borderline in size and he may not be able to pass this stone.  I called the urologist on-call, Dr. Ardon, to discuss and she recommends to allow the patient to attempt trial of passage at home and he may follow up in clinic within the next few days.  Discussed these recommendations with patient and he is agreeable with this plan.  I will  start him on Flomax and provide him a urine strainer to use.  The patient was counseled to keep his stone and bring it to his urology appointment if he is to pass it.  Will also give him medication for pain including ibuprofen and Norco and Zofran for nausea.  Counseled patient to drink at least 2 L of water per day.    Of note: Ddx to include but not limited to:  Kidney stone, pyelonephritis, UTI, gallstones      Amount and/or Complexity of Data Reviewed  Labs: ordered. Decision-making details documented in ED Course.  Radiology: ordered.    Risk  Prescription drug management.            Scribe Attestation:   Scribe #1: I performed the above scribed service and the documentation accurately describes the services I performed. I attest to the accuracy of the note.        ED Course as of 10/19/23 1647   Thu Oct 19, 2023   1125 Comprehensive metabolic panel(!)  wnl [MB]   1125 CBC auto differential  wnl [MB]   1126 Patient states he was feeling a little better after receiving Toradol.  I offered additional pain medication but he states he is okay at the moment. [MB]      ED Course User Index  [MB] Violeta De La Rosa PA-C             IVioleta, personally performed the services described in this documentation. All medical record entries made by the scribe were at my direction and in my presence. I have reviewed the chart and agree that the record reflects my personal performance and is accurate and complete.        Clinical Impression:   Final diagnoses:  [N20.0] Kidney stone (Primary)  [N28.1] Renal cyst        ED Disposition Condition    Discharge Stable          ED Prescriptions       Medication Sig Dispense Start Date End Date Auth. Provider    tamsulosin (FLOMAX) 0.4 mg Cap Take 1 capsule (0.4 mg total) by mouth once daily. 14 capsule 10/19/2023 -- Violeta De La Rosa PA-C    ibuprofen (ADVIL,MOTRIN) 800 MG tablet Take 1 tablet (800 mg total) by mouth every 8 (eight) hours as needed for Pain. 25 tablet 10/19/2023  -- Violeta De La Rosa PA-C    HYDROcodone-acetaminophen (NORCO) 7.5-325 mg per tablet Take 1 tablet by mouth every 6 (six) hours as needed for Pain. 15 tablet 10/19/2023 -- Violeta De La Rosa PA-C    ondansetron (ZOFRAN-ODT) 4 MG TbDL Take 1 tablet (4 mg total) by mouth every 8 (eight) hours as needed (nausea). 15 tablet 10/19/2023 -- Violeta De La Rosa PA-C          Follow-up Information       Follow up With Specialties Details Why Contact Info    Stephenie Ardon MD Urology Schedule an appointment as soon as possible for a visit in 3 days For follow up [address]  [address]  [facility] [address]  [phone]      [facility] - Emergency Dept Emergency Medicine Go to  As needed, If symptoms worsen [address]  [facility] [address] [phone]             Violeta De La Rosa PA-C  10/19/23 8596

## 2023-11-06 NOTE — TELEPHONE ENCOUNTER
Received [employer] message from pharmacy requesting refill of mirtazapine. Requesting 90 day supply    Last refill: 10/13/2023  Last office visit: 10/13/2023  Scheduled for follow up pending, message sent to scheduling     Will route request to MD for review.     Reviewed [place]  Report.